# Patient Record
Sex: MALE | Race: WHITE | NOT HISPANIC OR LATINO | Employment: FULL TIME | URBAN - METROPOLITAN AREA
[De-identification: names, ages, dates, MRNs, and addresses within clinical notes are randomized per-mention and may not be internally consistent; named-entity substitution may affect disease eponyms.]

---

## 2017-03-08 ENCOUNTER — HOSPITAL ENCOUNTER (OUTPATIENT)
Dept: OTHER | Facility: HOSPITAL | Age: 27
Discharge: HOME | End: 2017-03-08
Attending: FAMILY MEDICINE | Admitting: FAMILY MEDICINE

## 2017-03-23 ENCOUNTER — CONVERSION ENCOUNTER (OUTPATIENT)
Dept: FAMILY MEDICINE CLINIC | Facility: CLINIC | Age: 27
End: 2017-03-23

## 2017-03-23 LAB — HEMOCCULT STL QL IA: NEGATIVE

## 2017-06-01 ENCOUNTER — HOSPITAL ENCOUNTER (OUTPATIENT)
Dept: OTHER | Facility: HOSPITAL | Age: 27
Discharge: HOME | End: 2017-06-01
Attending: ORTHOPAEDIC SURGERY | Admitting: ORTHOPAEDIC SURGERY

## 2017-06-16 ENCOUNTER — HOSPITAL ENCOUNTER (OUTPATIENT)
Dept: PHYSICAL THERAPY | Facility: HOSPITAL | Age: 27
Setting detail: RECURRING SERIES
Discharge: HOME | End: 2017-08-01
Attending: ORTHOPAEDIC SURGERY | Admitting: ORTHOPAEDIC SURGERY

## 2017-08-10 ENCOUNTER — HOSPITAL ENCOUNTER (OUTPATIENT)
Dept: OTHER | Facility: HOSPITAL | Age: 27
Discharge: HOME | End: 2017-08-10
Attending: FAMILY MEDICINE | Admitting: FAMILY MEDICINE

## 2017-08-29 ENCOUNTER — HOSPITAL ENCOUNTER (OUTPATIENT)
Dept: PREOP | Facility: HOSPITAL | Age: 27
Setting detail: HOSPITAL OUTPATIENT SURGERY
Discharge: HOME | End: 2017-08-29
Attending: SURGERY | Admitting: SURGERY

## 2017-08-29 LAB
ALBUMIN SERPL-MCNC: 3.6 G/DL (ref 3.5–4.8)
ALBUMIN/GLOB SERPL: 1.2 {RATIO} (ref 1–1.7)
ALP SERPL-CCNC: 80 IU/L (ref 32–91)
ALT SERPL-CCNC: 59 IU/L (ref 17–63)
ANION GAP SERPL CALC-SCNC: 12.2 MMOL/L (ref 10–20)
AST SERPL-CCNC: 34 IU/L (ref 15–41)
BILIRUB SERPL-MCNC: 0.8 MG/DL (ref 0.3–1.2)
BUN SERPL-MCNC: 6 MG/DL (ref 8–20)
BUN/CREAT SERPL: 8.6 (ref 6.2–20.3)
CALCIUM SERPL-MCNC: 9 MG/DL (ref 8.9–10.3)
CHLORIDE SERPL-SCNC: 103 MMOL/L (ref 101–111)
CONV CO2: 27 MMOL/L (ref 22–32)
CONV TOTAL PROTEIN: 6.6 G/DL (ref 6.1–7.9)
CREAT UR-MCNC: 0.7 MG/DL (ref 0.7–1.2)
GLOBULIN UR ELPH-MCNC: 3 G/DL (ref 2.5–3.8)
GLUCOSE SERPL-MCNC: 92 MG/DL (ref 65–99)
POTASSIUM SERPL-SCNC: 3.2 MMOL/L (ref 3.6–5.1)
SODIUM SERPL-SCNC: 139 MMOL/L (ref 136–144)

## 2017-09-20 ENCOUNTER — HOSPITAL ENCOUNTER (OUTPATIENT)
Dept: LAB | Facility: HOSPITAL | Age: 27
Discharge: HOME | End: 2017-09-20
Attending: SURGERY | Admitting: SURGERY

## 2017-09-28 ENCOUNTER — HOSPITAL ENCOUNTER (OUTPATIENT)
Dept: MRI IMAGING | Facility: HOSPITAL | Age: 27
Discharge: HOME | End: 2017-09-28
Attending: SURGERY | Admitting: SURGERY

## 2017-10-02 ENCOUNTER — OFFICE (AMBULATORY)
Dept: URBAN - METROPOLITAN AREA CLINIC 64 | Facility: CLINIC | Age: 27
End: 2017-10-02

## 2017-10-02 VITALS
WEIGHT: 315 LBS | HEART RATE: 99 BPM | DIASTOLIC BLOOD PRESSURE: 72 MMHG | HEIGHT: 74 IN | SYSTOLIC BLOOD PRESSURE: 133 MMHG

## 2017-10-02 DIAGNOSIS — K59.00 CONSTIPATION, UNSPECIFIED: ICD-10-CM

## 2017-10-02 DIAGNOSIS — R11.0 NAUSEA: ICD-10-CM

## 2017-10-02 DIAGNOSIS — R10.11 RIGHT UPPER QUADRANT PAIN: ICD-10-CM

## 2017-10-02 LAB
AMYLASE, SERUM: 54 U/L (ref 31–124)
C-REACTIVE PROTEIN, QUANT: 6.3 MG/L — HIGH (ref 0–4.9)
CBC WITH DIFFERENTIAL/PLATELET: BASO (ABSOLUTE): 0 X10E3/UL (ref 0–0.2)
CBC WITH DIFFERENTIAL/PLATELET: BASOS: 1 %
CBC WITH DIFFERENTIAL/PLATELET: EOS (ABSOLUTE): 0.4 X10E3/UL (ref 0–0.4)
CBC WITH DIFFERENTIAL/PLATELET: EOS: 6 %
CBC WITH DIFFERENTIAL/PLATELET: HEMATOCRIT: 39.1 % (ref 37.5–51)
CBC WITH DIFFERENTIAL/PLATELET: HEMATOLOGY COMMENTS: (no result)
CBC WITH DIFFERENTIAL/PLATELET: HEMOGLOBIN: 12.3 G/DL — LOW (ref 12.6–17.7)
CBC WITH DIFFERENTIAL/PLATELET: IMMATURE CELLS: (no result)
CBC WITH DIFFERENTIAL/PLATELET: IMMATURE GRANS (ABS): 0 X10E3/UL (ref 0–0.1)
CBC WITH DIFFERENTIAL/PLATELET: IMMATURE GRANULOCYTES: 0 %
CBC WITH DIFFERENTIAL/PLATELET: LYMPHS (ABSOLUTE): 2.6 X10E3/UL (ref 0.7–3.1)
CBC WITH DIFFERENTIAL/PLATELET: LYMPHS: 37 %
CBC WITH DIFFERENTIAL/PLATELET: MCH: 28.3 PG (ref 26.6–33)
CBC WITH DIFFERENTIAL/PLATELET: MCHC: 31.5 G/DL (ref 31.5–35.7)
CBC WITH DIFFERENTIAL/PLATELET: MCV: 90 FL (ref 79–97)
CBC WITH DIFFERENTIAL/PLATELET: MONOCYTES(ABSOLUTE): 0.5 X10E3/UL (ref 0.1–0.9)
CBC WITH DIFFERENTIAL/PLATELET: MONOCYTES: 8 %
CBC WITH DIFFERENTIAL/PLATELET: NEUTROPHILS (ABSOLUTE): 3.3 X10E3/UL (ref 1.4–7)
CBC WITH DIFFERENTIAL/PLATELET: NEUTROPHILS: 48 %
CBC WITH DIFFERENTIAL/PLATELET: NRBC: (no result)
CBC WITH DIFFERENTIAL/PLATELET: PLATELETS: 379 X10E3/UL (ref 150–379)
CBC WITH DIFFERENTIAL/PLATELET: RBC: 4.35 X10E6/UL (ref 4.14–5.8)
CBC WITH DIFFERENTIAL/PLATELET: RDW: 14.1 % (ref 12.3–15.4)
CBC WITH DIFFERENTIAL/PLATELET: WBC: 6.9 X10E3/UL (ref 3.4–10.8)
COMP. METABOLIC PANEL (14): A/G RATIO: 1.4 (ref 1.2–2.2)
COMP. METABOLIC PANEL (14): ALBUMIN, SERUM: 4.2 G/DL (ref 3.5–5.5)
COMP. METABOLIC PANEL (14): ALKALINE PHOSPHATASE, S: 102 IU/L (ref 39–117)
COMP. METABOLIC PANEL (14): ALT (SGPT): 21 IU/L (ref 0–44)
COMP. METABOLIC PANEL (14): AST (SGOT): 15 IU/L (ref 0–40)
COMP. METABOLIC PANEL (14): BILIRUBIN, TOTAL: 0.2 MG/DL (ref 0–1.2)
COMP. METABOLIC PANEL (14): BUN/CREATININE RATIO: 14 (ref 9–20)
COMP. METABOLIC PANEL (14): BUN: 8 MG/DL (ref 6–20)
COMP. METABOLIC PANEL (14): CALCIUM, SERUM: 9.3 MG/DL (ref 8.7–10.2)
COMP. METABOLIC PANEL (14): CARBON DIOXIDE, TOTAL: 24 MMOL/L (ref 18–29)
COMP. METABOLIC PANEL (14): CHLORIDE, SERUM: 101 MMOL/L (ref 96–106)
COMP. METABOLIC PANEL (14): CREATININE, SERUM: 0.59 MG/DL — LOW (ref 0.76–1.27)
COMP. METABOLIC PANEL (14): EGFR IF AFRICN AM: 160 ML/MIN/1.73 (ref 59–?)
COMP. METABOLIC PANEL (14): EGFR IF NONAFRICN AM: 139 ML/MIN/1.73 (ref 59–?)
COMP. METABOLIC PANEL (14): GLOBULIN, TOTAL: 2.9 G/DL (ref 1.5–4.5)
COMP. METABOLIC PANEL (14): GLUCOSE, SERUM: 100 MG/DL — HIGH (ref 65–99)
COMP. METABOLIC PANEL (14): POTASSIUM, SERUM: 3.8 MMOL/L (ref 3.5–5.2)
COMP. METABOLIC PANEL (14): PROTEIN, TOTAL, SERUM: 7.1 G/DL (ref 6–8.5)
COMP. METABOLIC PANEL (14): SODIUM, SERUM: 142 MMOL/L (ref 134–144)
LIPASE, SERUM: 20 U/L (ref 13–78)

## 2017-10-02 PROCEDURE — 99243 OFF/OP CNSLTJ NEW/EST LOW 30: CPT | Performed by: NURSE PRACTITIONER

## 2017-10-02 RX ORDER — PANTOPRAZOLE SODIUM 40 MG/1
TABLET, DELAYED RELEASE ORAL
Qty: 30 | Refills: 1 | Status: COMPLETED
Start: 2017-10-02 | End: 2017-11-06

## 2017-10-02 RX ORDER — DICYCLOMINE HYDROCHLORIDE 20.6 MG/1
40 TABLET ORAL
Qty: 60 | Refills: 1 | Status: COMPLETED
Start: 2017-10-02 | End: 2017-11-06

## 2017-10-13 ENCOUNTER — ON CAMPUS - OUTPATIENT (AMBULATORY)
Dept: URBAN - METROPOLITAN AREA HOSPITAL 2 | Facility: HOSPITAL | Age: 27
End: 2017-10-13
Payer: COMMERCIAL

## 2017-10-13 VITALS
DIASTOLIC BLOOD PRESSURE: 81 MMHG | DIASTOLIC BLOOD PRESSURE: 69 MMHG | HEART RATE: 83 BPM | HEIGHT: 74 IN | OXYGEN SATURATION: 100 % | OXYGEN SATURATION: 96 % | DIASTOLIC BLOOD PRESSURE: 73 MMHG | RESPIRATION RATE: 18 BRPM | RESPIRATION RATE: 16 BRPM | SYSTOLIC BLOOD PRESSURE: 122 MMHG | HEART RATE: 73 BPM | SYSTOLIC BLOOD PRESSURE: 130 MMHG | SYSTOLIC BLOOD PRESSURE: 121 MMHG | SYSTOLIC BLOOD PRESSURE: 149 MMHG | HEART RATE: 72 BPM | HEART RATE: 79 BPM | WEIGHT: 315 LBS | OXYGEN SATURATION: 91 % | DIASTOLIC BLOOD PRESSURE: 72 MMHG | SYSTOLIC BLOOD PRESSURE: 159 MMHG | DIASTOLIC BLOOD PRESSURE: 51 MMHG | TEMPERATURE: 97.4 F | SYSTOLIC BLOOD PRESSURE: 136 MMHG | OXYGEN SATURATION: 97 % | HEART RATE: 81 BPM | HEART RATE: 96 BPM

## 2017-10-13 DIAGNOSIS — K44.9 DIAPHRAGMATIC HERNIA WITHOUT OBSTRUCTION OR GANGRENE: ICD-10-CM

## 2017-10-13 DIAGNOSIS — K21.0 GASTRO-ESOPHAGEAL REFLUX DISEASE WITH ESOPHAGITIS: ICD-10-CM

## 2017-10-13 DIAGNOSIS — R11.0 NAUSEA: ICD-10-CM

## 2017-10-13 DIAGNOSIS — K92.0 HEMATEMESIS: ICD-10-CM

## 2017-10-13 DIAGNOSIS — R10.11 RIGHT UPPER QUADRANT PAIN: ICD-10-CM

## 2017-10-13 PROBLEM — K20.8 OTHER ESOPHAGITIS: Status: ACTIVE | Noted: 2017-10-13

## 2017-10-13 PROBLEM — K21.9 GERD: Status: ACTIVE | Noted: 2017-10-13

## 2017-10-13 PROCEDURE — 43235 EGD DIAGNOSTIC BRUSH WASH: CPT | Performed by: INTERNAL MEDICINE

## 2017-10-13 PROCEDURE — 43450 DILATE ESOPHAGUS 1/MULT PASS: CPT | Performed by: INTERNAL MEDICINE

## 2017-10-13 RX ORDER — METOCLOPRAMIDE HYDROCHLORIDE 10 MG/1
30 TABLET ORAL
Qty: 90 | Refills: 10 | Status: COMPLETED
Start: 2017-10-13 | End: 2017-11-06

## 2017-10-13 RX ORDER — SUCRALFATE 1 G/1
TABLET ORAL
Qty: 0 | Refills: 0 | Status: COMPLETED
End: 2017-10-13

## 2017-10-13 RX ADMIN — PROPOFOL: 10 INJECTION, EMULSION INTRAVENOUS at 08:07

## 2017-11-06 ENCOUNTER — OFFICE (AMBULATORY)
Dept: URBAN - METROPOLITAN AREA CLINIC 64 | Facility: CLINIC | Age: 27
End: 2017-11-06

## 2017-11-06 VITALS
HEART RATE: 71 BPM | WEIGHT: 315 LBS | DIASTOLIC BLOOD PRESSURE: 71 MMHG | SYSTOLIC BLOOD PRESSURE: 116 MMHG | HEIGHT: 74 IN

## 2017-11-06 DIAGNOSIS — R11.2 NAUSEA WITH VOMITING, UNSPECIFIED: ICD-10-CM

## 2017-11-06 DIAGNOSIS — R10.11 RIGHT UPPER QUADRANT PAIN: ICD-10-CM

## 2017-11-06 DIAGNOSIS — R10.13 EPIGASTRIC PAIN: ICD-10-CM

## 2017-11-06 DIAGNOSIS — K59.00 CONSTIPATION, UNSPECIFIED: ICD-10-CM

## 2017-11-06 DIAGNOSIS — E66.01 MORBID (SEVERE) OBESITY DUE TO EXCESS CALORIES: ICD-10-CM

## 2017-11-06 LAB
AMYLASE, SERUM: 56 U/L (ref 31–124)
C-REACTIVE PROTEIN, QUANT: 5.2 MG/L — HIGH (ref 0–4.9)
CBC WITH DIFFERENTIAL/PLATELET: BASO (ABSOLUTE): 0 X10E3/UL (ref 0–0.2)
CBC WITH DIFFERENTIAL/PLATELET: BASOS: 1 %
CBC WITH DIFFERENTIAL/PLATELET: EOS (ABSOLUTE): 0.3 X10E3/UL (ref 0–0.4)
CBC WITH DIFFERENTIAL/PLATELET: EOS: 3 %
CBC WITH DIFFERENTIAL/PLATELET: HEMATOCRIT: 38 % (ref 37.5–51)
CBC WITH DIFFERENTIAL/PLATELET: HEMATOLOGY COMMENTS: (no result)
CBC WITH DIFFERENTIAL/PLATELET: HEMOGLOBIN: 12.3 G/DL — LOW (ref 12.6–17.7)
CBC WITH DIFFERENTIAL/PLATELET: IMMATURE CELLS: (no result)
CBC WITH DIFFERENTIAL/PLATELET: IMMATURE GRANS (ABS): 0 X10E3/UL (ref 0–0.1)
CBC WITH DIFFERENTIAL/PLATELET: IMMATURE GRANULOCYTES: 0 %
CBC WITH DIFFERENTIAL/PLATELET: LYMPHS (ABSOLUTE): 2.8 X10E3/UL (ref 0.7–3.1)
CBC WITH DIFFERENTIAL/PLATELET: LYMPHS: 39 %
CBC WITH DIFFERENTIAL/PLATELET: MCH: 28.8 PG (ref 26.6–33)
CBC WITH DIFFERENTIAL/PLATELET: MCHC: 32.4 G/DL (ref 31.5–35.7)
CBC WITH DIFFERENTIAL/PLATELET: MCV: 89 FL (ref 79–97)
CBC WITH DIFFERENTIAL/PLATELET: MONOCYTES(ABSOLUTE): 0.6 X10E3/UL (ref 0.1–0.9)
CBC WITH DIFFERENTIAL/PLATELET: MONOCYTES: 9 %
CBC WITH DIFFERENTIAL/PLATELET: NEUTROPHILS (ABSOLUTE): 3.6 X10E3/UL (ref 1.4–7)
CBC WITH DIFFERENTIAL/PLATELET: NEUTROPHILS: 48 %
CBC WITH DIFFERENTIAL/PLATELET: NRBC: (no result)
CBC WITH DIFFERENTIAL/PLATELET: PLATELETS: 404 X10E3/UL — HIGH (ref 150–379)
CBC WITH DIFFERENTIAL/PLATELET: RBC: 4.27 X10E6/UL (ref 4.14–5.8)
CBC WITH DIFFERENTIAL/PLATELET: RDW: 14.3 % (ref 12.3–15.4)
CBC WITH DIFFERENTIAL/PLATELET: WBC: 7.3 X10E3/UL (ref 3.4–10.8)
COMP. METABOLIC PANEL (14): A/G RATIO: 1.5 (ref 1.2–2.2)
COMP. METABOLIC PANEL (14): ALBUMIN, SERUM: 4.3 G/DL (ref 3.5–5.5)
COMP. METABOLIC PANEL (14): ALKALINE PHOSPHATASE, S: 116 IU/L (ref 39–117)
COMP. METABOLIC PANEL (14): ALT (SGPT): 22 IU/L (ref 0–44)
COMP. METABOLIC PANEL (14): AST (SGOT): 14 IU/L (ref 0–40)
COMP. METABOLIC PANEL (14): BILIRUBIN, TOTAL: 0.4 MG/DL (ref 0–1.2)
COMP. METABOLIC PANEL (14): BUN/CREATININE RATIO: 13 (ref 9–20)
COMP. METABOLIC PANEL (14): BUN: 8 MG/DL (ref 6–20)
COMP. METABOLIC PANEL (14): CALCIUM, SERUM: 9.5 MG/DL (ref 8.7–10.2)
COMP. METABOLIC PANEL (14): CARBON DIOXIDE, TOTAL: 25 MMOL/L (ref 18–29)
COMP. METABOLIC PANEL (14): CHLORIDE, SERUM: 101 MMOL/L (ref 96–106)
COMP. METABOLIC PANEL (14): CREATININE, SERUM: 0.6 MG/DL — LOW (ref 0.76–1.27)
COMP. METABOLIC PANEL (14): EGFR IF AFRICN AM: 159 ML/MIN/1.73 (ref 59–?)
COMP. METABOLIC PANEL (14): EGFR IF NONAFRICN AM: 138 ML/MIN/1.73 (ref 59–?)
COMP. METABOLIC PANEL (14): GLOBULIN, TOTAL: 2.9 G/DL (ref 1.5–4.5)
COMP. METABOLIC PANEL (14): GLUCOSE, SERUM: 98 MG/DL (ref 65–99)
COMP. METABOLIC PANEL (14): POTASSIUM, SERUM: 4.3 MMOL/L (ref 3.5–5.2)
COMP. METABOLIC PANEL (14): PROTEIN, TOTAL, SERUM: 7.2 G/DL (ref 6–8.5)
COMP. METABOLIC PANEL (14): SODIUM, SERUM: 143 MMOL/L (ref 134–144)
LIPASE, SERUM: 19 U/L (ref 13–78)

## 2017-11-06 PROCEDURE — 99213 OFFICE O/P EST LOW 20 MIN: CPT | Performed by: NURSE PRACTITIONER

## 2017-11-10 ENCOUNTER — HOSPITAL ENCOUNTER (OUTPATIENT)
Dept: NUCLEAR MEDICINE | Facility: HOSPITAL | Age: 27
Discharge: HOME | End: 2017-11-10
Attending: NURSE PRACTITIONER | Admitting: NURSE PRACTITIONER

## 2017-11-29 ENCOUNTER — OFFICE (AMBULATORY)
Dept: URBAN - METROPOLITAN AREA CLINIC 64 | Facility: CLINIC | Age: 27
End: 2017-11-29

## 2017-11-29 VITALS
SYSTOLIC BLOOD PRESSURE: 144 MMHG | WEIGHT: 315 LBS | HEIGHT: 74 IN | DIASTOLIC BLOOD PRESSURE: 85 MMHG | HEART RATE: 80 BPM

## 2017-11-29 DIAGNOSIS — R11.2 NAUSEA WITH VOMITING, UNSPECIFIED: ICD-10-CM

## 2017-11-29 DIAGNOSIS — R10.13 EPIGASTRIC PAIN: ICD-10-CM

## 2017-11-29 DIAGNOSIS — R63.4 ABNORMAL WEIGHT LOSS: ICD-10-CM

## 2017-11-29 DIAGNOSIS — R10.11 RIGHT UPPER QUADRANT PAIN: ICD-10-CM

## 2017-11-29 LAB
AMYLASE, SERUM: 51 U/L (ref 31–124)
C-REACTIVE PROTEIN, QUANT: 8.2 MG/L — HIGH (ref 0–4.9)
CBC WITH DIFFERENTIAL/PLATELET: BASO (ABSOLUTE): 0 X10E3/UL (ref 0–0.2)
CBC WITH DIFFERENTIAL/PLATELET: BASOS: 0 %
CBC WITH DIFFERENTIAL/PLATELET: EOS (ABSOLUTE): 0.1 X10E3/UL (ref 0–0.4)
CBC WITH DIFFERENTIAL/PLATELET: EOS: 1 %
CBC WITH DIFFERENTIAL/PLATELET: HEMATOCRIT: 39.7 % (ref 37.5–51)
CBC WITH DIFFERENTIAL/PLATELET: HEMATOLOGY COMMENTS: (no result)
CBC WITH DIFFERENTIAL/PLATELET: HEMOGLOBIN: 13.2 G/DL (ref 12.6–17.7)
CBC WITH DIFFERENTIAL/PLATELET: IMMATURE CELLS: (no result)
CBC WITH DIFFERENTIAL/PLATELET: IMMATURE GRANS (ABS): 0 X10E3/UL (ref 0–0.1)
CBC WITH DIFFERENTIAL/PLATELET: IMMATURE GRANULOCYTES: 0 %
CBC WITH DIFFERENTIAL/PLATELET: LYMPHS (ABSOLUTE): 3 X10E3/UL (ref 0.7–3.1)
CBC WITH DIFFERENTIAL/PLATELET: LYMPHS: 25 %
CBC WITH DIFFERENTIAL/PLATELET: MCH: 30.1 PG (ref 26.6–33)
CBC WITH DIFFERENTIAL/PLATELET: MCHC: 33.2 G/DL (ref 31.5–35.7)
CBC WITH DIFFERENTIAL/PLATELET: MCV: 91 FL (ref 79–97)
CBC WITH DIFFERENTIAL/PLATELET: MONOCYTES(ABSOLUTE): 0.9 X10E3/UL (ref 0.1–0.9)
CBC WITH DIFFERENTIAL/PLATELET: MONOCYTES: 7 %
CBC WITH DIFFERENTIAL/PLATELET: NEUTROPHILS (ABSOLUTE): 8.3 X10E3/UL — HIGH (ref 1.4–7)
CBC WITH DIFFERENTIAL/PLATELET: NEUTROPHILS: 67 %
CBC WITH DIFFERENTIAL/PLATELET: NRBC: (no result)
CBC WITH DIFFERENTIAL/PLATELET: PLATELETS: 438 X10E3/UL — HIGH (ref 150–379)
CBC WITH DIFFERENTIAL/PLATELET: RBC: 4.38 X10E6/UL (ref 4.14–5.8)
CBC WITH DIFFERENTIAL/PLATELET: RDW: 13.9 % (ref 12.3–15.4)
CBC WITH DIFFERENTIAL/PLATELET: WBC: 12.4 X10E3/UL — HIGH (ref 3.4–10.8)
COMP. METABOLIC PANEL (14): A/G RATIO: 1.4 (ref 1.2–2.2)
COMP. METABOLIC PANEL (14): ALBUMIN, SERUM: 4.5 G/DL (ref 3.5–5.5)
COMP. METABOLIC PANEL (14): ALKALINE PHOSPHATASE, S: 117 IU/L (ref 39–117)
COMP. METABOLIC PANEL (14): ALT (SGPT): 18 IU/L (ref 0–44)
COMP. METABOLIC PANEL (14): AST (SGOT): 13 IU/L (ref 0–40)
COMP. METABOLIC PANEL (14): BILIRUBIN, TOTAL: 0.4 MG/DL (ref 0–1.2)
COMP. METABOLIC PANEL (14): BUN/CREATININE RATIO: 19 (ref 9–20)
COMP. METABOLIC PANEL (14): BUN: 10 MG/DL (ref 6–20)
COMP. METABOLIC PANEL (14): CALCIUM, SERUM: 9.6 MG/DL (ref 8.7–10.2)
COMP. METABOLIC PANEL (14): CARBON DIOXIDE, TOTAL: 25 MMOL/L (ref 18–29)
COMP. METABOLIC PANEL (14): CHLORIDE, SERUM: 100 MMOL/L (ref 96–106)
COMP. METABOLIC PANEL (14): CREATININE, SERUM: 0.53 MG/DL — LOW (ref 0.76–1.27)
COMP. METABOLIC PANEL (14): EGFR IF AFRICN AM: 168 ML/MIN/1.73 (ref 59–?)
COMP. METABOLIC PANEL (14): EGFR IF NONAFRICN AM: 145 ML/MIN/1.73 (ref 59–?)
COMP. METABOLIC PANEL (14): GLOBULIN, TOTAL: 3.3 G/DL (ref 1.5–4.5)
COMP. METABOLIC PANEL (14): GLUCOSE, SERUM: 104 MG/DL — HIGH (ref 65–99)
COMP. METABOLIC PANEL (14): POTASSIUM, SERUM: 4.3 MMOL/L (ref 3.5–5.2)
COMP. METABOLIC PANEL (14): PROTEIN, TOTAL, SERUM: 7.8 G/DL (ref 6–8.5)
COMP. METABOLIC PANEL (14): SODIUM, SERUM: 142 MMOL/L (ref 134–144)
LIPASE, SERUM: 17 U/L (ref 13–78)

## 2017-11-29 PROCEDURE — 99214 OFFICE O/P EST MOD 30 MIN: CPT | Performed by: NURSE PRACTITIONER

## 2017-11-29 RX ORDER — ONDANSETRON HYDROCHLORIDE 4 MG/1
16 TABLET, ORALLY DISINTEGRATING ORAL
Qty: 60 | Refills: 0 | Status: ACTIVE
Start: 2017-11-29

## 2017-11-29 RX ORDER — PANTOPRAZOLE SODIUM 40 MG/1
40 TABLET, DELAYED RELEASE ORAL
Qty: 90 | Refills: 1 | Status: ACTIVE
Start: 2017-11-29

## 2017-12-29 ENCOUNTER — ON CAMPUS - OUTPATIENT (AMBULATORY)
Dept: URBAN - METROPOLITAN AREA HOSPITAL 2 | Facility: HOSPITAL | Age: 27
End: 2017-12-29

## 2017-12-29 ENCOUNTER — OFFICE (AMBULATORY)
Dept: URBAN - METROPOLITAN AREA CLINIC 64 | Facility: CLINIC | Age: 27
End: 2017-12-29

## 2017-12-29 VITALS
RESPIRATION RATE: 18 BRPM | DIASTOLIC BLOOD PRESSURE: 90 MMHG | OXYGEN SATURATION: 99 % | DIASTOLIC BLOOD PRESSURE: 61 MMHG | RESPIRATION RATE: 17 BRPM | HEART RATE: 88 BPM | HEIGHT: 74 IN | TEMPERATURE: 97.3 F | SYSTOLIC BLOOD PRESSURE: 128 MMHG | HEART RATE: 72 BPM | DIASTOLIC BLOOD PRESSURE: 55 MMHG | SYSTOLIC BLOOD PRESSURE: 119 MMHG | SYSTOLIC BLOOD PRESSURE: 120 MMHG | DIASTOLIC BLOOD PRESSURE: 60 MMHG | SYSTOLIC BLOOD PRESSURE: 122 MMHG | SYSTOLIC BLOOD PRESSURE: 133 MMHG | SYSTOLIC BLOOD PRESSURE: 147 MMHG | HEART RATE: 91 BPM | RESPIRATION RATE: 16 BRPM | OXYGEN SATURATION: 98 % | HEART RATE: 86 BPM | HEART RATE: 80 BPM | WEIGHT: 315 LBS | HEART RATE: 79 BPM | HEART RATE: 76 BPM | DIASTOLIC BLOOD PRESSURE: 78 MMHG | DIASTOLIC BLOOD PRESSURE: 70 MMHG | HEART RATE: 82 BPM | DIASTOLIC BLOOD PRESSURE: 83 MMHG | SYSTOLIC BLOOD PRESSURE: 94 MMHG | OXYGEN SATURATION: 97 % | DIASTOLIC BLOOD PRESSURE: 82 MMHG

## 2017-12-29 DIAGNOSIS — K62.6 ULCER OF ANUS AND RECTUM: ICD-10-CM

## 2017-12-29 DIAGNOSIS — R10.11 RIGHT UPPER QUADRANT PAIN: ICD-10-CM

## 2017-12-29 DIAGNOSIS — R11.2 NAUSEA WITH VOMITING, UNSPECIFIED: ICD-10-CM

## 2017-12-29 DIAGNOSIS — R63.4 ABNORMAL WEIGHT LOSS: ICD-10-CM

## 2017-12-29 DIAGNOSIS — K62.89 OTHER SPECIFIED DISEASES OF ANUS AND RECTUM: ICD-10-CM

## 2017-12-29 LAB
GI HISTOLOGY: A. UNSPECIFIED: (no result)
GI HISTOLOGY: PDF REPORT: (no result)

## 2017-12-29 PROCEDURE — 45380 COLONOSCOPY AND BIOPSY: CPT | Performed by: INTERNAL MEDICINE

## 2017-12-29 PROCEDURE — 88305 TISSUE EXAM BY PATHOLOGIST: CPT | Performed by: INTERNAL MEDICINE

## 2017-12-29 RX ADMIN — PROPOFOL: 10 INJECTION, EMULSION INTRAVENOUS at 14:09

## 2018-01-10 ENCOUNTER — OFFICE (AMBULATORY)
Dept: URBAN - METROPOLITAN AREA CLINIC 64 | Facility: CLINIC | Age: 28
End: 2018-01-10
Payer: COMMERCIAL

## 2018-01-10 VITALS
HEIGHT: 74 IN | DIASTOLIC BLOOD PRESSURE: 76 MMHG | WEIGHT: 315 LBS | SYSTOLIC BLOOD PRESSURE: 116 MMHG | HEART RATE: 76 BPM

## 2018-01-10 DIAGNOSIS — R63.4 ABNORMAL WEIGHT LOSS: ICD-10-CM

## 2018-01-10 DIAGNOSIS — R11.2 NAUSEA WITH VOMITING, UNSPECIFIED: ICD-10-CM

## 2018-01-10 PROCEDURE — 99213 OFFICE O/P EST LOW 20 MIN: CPT | Performed by: INTERNAL MEDICINE

## 2018-01-10 RX ORDER — METOCLOPRAMIDE 5 MG/1
10 TABLET ORAL
Qty: 60 | Refills: 3 | Status: ACTIVE
Start: 2018-01-10

## 2019-09-09 ENCOUNTER — OFFICE VISIT (OUTPATIENT)
Dept: FAMILY MEDICINE CLINIC | Facility: CLINIC | Age: 29
End: 2019-09-09

## 2019-09-09 VITALS
TEMPERATURE: 97.7 F | RESPIRATION RATE: 18 BRPM | OXYGEN SATURATION: 97 % | HEART RATE: 123 BPM | BODY MASS INDEX: 40.43 KG/M2 | DIASTOLIC BLOOD PRESSURE: 74 MMHG | SYSTOLIC BLOOD PRESSURE: 136 MMHG | WEIGHT: 315 LBS | HEIGHT: 74 IN

## 2019-09-09 DIAGNOSIS — S92.355S: ICD-10-CM

## 2019-09-09 DIAGNOSIS — Z01.818 PREOP EXAMINATION: Primary | ICD-10-CM

## 2019-09-09 PROBLEM — M12.561 TRAUMATIC ARTHRITIS OF RIGHT KNEE: Status: ACTIVE | Noted: 2019-09-09

## 2019-09-09 PROBLEM — S92.355A NONDISPLACED FRACTURE OF FIFTH LEFT METATARSAL BONE: Status: ACTIVE | Noted: 2019-09-09

## 2019-09-09 PROBLEM — S82.024A CLOSED NONDISPLACED LONGITUDINAL FRACTURE OF RIGHT PATELLA: Status: ACTIVE | Noted: 2019-09-09

## 2019-09-09 PROBLEM — E66.01 MORBID OBESITY: Status: ACTIVE | Noted: 2019-09-09

## 2019-09-09 PROCEDURE — 99213 OFFICE O/P EST LOW 20 MIN: CPT | Performed by: FAMILY MEDICINE

## 2019-09-09 NOTE — PROGRESS NOTES
Subjective   Johnny Duran is a 29 y.o. male.     Needing approval for left foot surgery. Surgeon is  from [facility]. Unable to schedule surgery until approved by PCP.       Foot Injury    Incident onset: 3/28/2019. The pain is present in the left foot. Pertinent negatives include no numbness.        The following portions of the patient's history were reviewed and updated as appropriate: allergies, current medications, past family history, past medical history, past social history, past surgical history and problem list.    Patient Active Problem List   Diagnosis   • Closed nondisplaced longitudinal fracture of right patella   • Morbid obesity (CMS/HCC)   • Nondisplaced fracture of fifth left metatarsal bone   • Traumatic arthritis of right knee       No current outpatient medications on file prior to visit.     No current facility-administered medications on file prior to visit.        Allergies   Allergen Reactions   • Codeine Unknown (See Comments)       Review of Systems   Constitutional: Negative for activity change, appetite change, fatigue and fever.   HENT: Negative for ear pain, swollen glands and voice change.    Eyes: Negative for visual disturbance.   Respiratory: Negative for shortness of breath and wheezing.    Cardiovascular: Negative for chest pain and leg swelling.   Gastrointestinal: Negative for abdominal pain, blood in stool, constipation, diarrhea, nausea and vomiting.   Endocrine: Negative for polydipsia and polyuria.   Genitourinary: Negative for dysuria, frequency and hematuria.   Musculoskeletal: Positive for arthralgias. Negative for joint swelling, neck pain and neck stiffness.   Skin: Negative for rash and bruise.   Neurological: Negative for weakness, numbness and headache.   Psychiatric/Behavioral: Negative for suicidal ideas and depressed mood.       Objective   Physical Exam   Constitutional: He is oriented to person, place, and time. He appears  well-developed and well-nourished.   Eyes: Conjunctivae and EOM are normal. Pupils are equal, round, and reactive to light.   Neck: Normal range of motion. Neck supple.   Cardiovascular: Normal rate, regular rhythm and normal heart sounds.   Pulmonary/Chest: Effort normal and breath sounds normal.   Abdominal: Soft. Bowel sounds are normal.   Musculoskeletal: Normal range of motion.   Left foot in walking boot   Neurological: He is alert and oriented to person, place, and time.   Skin: Skin is warm and dry.   Psychiatric: He has a normal mood and affect. His behavior is normal. Judgment and thought content normal.         Assessment/Plan .  Problem List Items Addressed This Visit     Nondisplaced fracture of fifth left metatarsal bone    Current Assessment & Plan     On Aug 2018 and March 28 2019           Other Visit Diagnoses     Preop examination    -  Primary    cleared        EKG reviewed and normal sinus rhythm.  Cleared for surgery

## 2019-09-12 ENCOUNTER — TELEPHONE (OUTPATIENT)
Dept: FAMILY MEDICINE CLINIC | Facility: CLINIC | Age: 29
End: 2019-09-12

## 2019-09-12 NOTE — TELEPHONE ENCOUNTER
PT IS CALLING TO CHECK ON HIS MEDICAL CLEARANCE FROM DR OJEDA FROM Monday OR Tuesday BECAUSE [facility] HASNT RECEIVED ANYTHING YET.  [phone]

## 2019-09-27 ENCOUNTER — TELEPHONE (OUTPATIENT)
Dept: FAMILY MEDICINE CLINIC | Facility: CLINIC | Age: 29
End: 2019-09-27

## 2019-10-02 ENCOUNTER — TELEPHONE (OUTPATIENT)
Dept: FAMILY MEDICINE CLINIC | Facility: CLINIC | Age: 29
End: 2019-10-02

## 2020-08-07 ENCOUNTER — OFFICE VISIT (OUTPATIENT)
Dept: FAMILY MEDICINE CLINIC | Facility: CLINIC | Age: 30
End: 2020-08-07

## 2020-08-07 ENCOUNTER — HOSPITAL ENCOUNTER (OUTPATIENT)
Dept: GENERAL RADIOLOGY | Facility: HOSPITAL | Age: 30
Discharge: HOME | End: 2020-08-07
Admitting: FAMILY MEDICINE

## 2020-08-07 VITALS
RESPIRATION RATE: 20 BRPM | OXYGEN SATURATION: 96 % | BODY MASS INDEX: 40.43 KG/M2 | TEMPERATURE: 98.2 F | HEIGHT: 74 IN | WEIGHT: 315 LBS | HEART RATE: 96 BPM

## 2020-08-07 DIAGNOSIS — S92.355S: Primary | ICD-10-CM

## 2020-08-07 PROCEDURE — 99213 OFFICE O/P EST LOW 20 MIN: CPT | Performed by: FAMILY MEDICINE

## 2020-08-07 PROCEDURE — 73630 X-RAY EXAM OF FOOT: CPT

## 2020-08-07 RX ORDER — DICLOFENAC SODIUM 75 MG/1
75 TABLET, DELAYED RELEASE ORAL 2 TIMES DAILY
Qty: 60 TABLET | Refills: 0
Start: 2020-08-07 | End: 2021-10-15

## 2020-08-07 RX ORDER — LIDOCAINE 50 MG/G
PATCH TOPICAL
Qty: 30 PATCH | Refills: 2 | Status: SHIPPED | OUTPATIENT
Start: 2020-08-07 | End: 2021-10-15

## 2020-08-07 NOTE — PROGRESS NOTES
Subjective   Johnny Duran is a 30 y.o. male.     Hx of foot surgery on 10/31/19.     Seen surgeon,  on 7/23/20, was prescribed medication, pt doesn't remember name of med and was given a steroid injection in foot. Recommended he see PCP to get referred to a specialist for pain on bottom of left foot.   Ongoing left foot pain s/p hardware. Pt states that podiatry has states pt has met MMI from his standpoint.  Pt was told that he may need braces or repeat surgery at different times. Pt would like second opinion for tx options.     Foot Injury    Incident onset: 3/28/2019. The pain is present in the left foot. Quality: throbbing. The pain has been intermittent since onset. Pertinent negatives include no numbness or tingling. The symptoms are aggravated by weight bearing and movement.        The following portions of the patient's history were reviewed and updated as appropriate: allergies, current medications, past family history, past medical history, past social history, past surgical history and problem list.    Patient Active Problem List   Diagnosis   • Closed nondisplaced longitudinal fracture of right patella   • Morbid obesity (CMS/HCC)   • Nondisplaced fracture of fifth left metatarsal bone   • Traumatic arthritis of right knee       No current outpatient medications on file prior to visit.     No current facility-administered medications on file prior to visit.      Current outpatient and discharge medications have been reconciled for the patient.  Reviewed by: Edouard Vazquez MD      Allergies   Allergen Reactions   • Codeine Unknown (See Comments)       Review of Systems   Constitutional: Negative for activity change, appetite change, fatigue and fever.   HENT: Negative for ear pain, swollen glands and voice change.    Eyes: Negative for visual disturbance.   Respiratory: Negative for shortness of breath and wheezing.    Cardiovascular: Negative for chest pain and leg swelling.    Gastrointestinal: Negative for abdominal pain, blood in stool, constipation, diarrhea, nausea and vomiting.   Endocrine: Negative for polydipsia and polyuria.   Genitourinary: Negative for dysuria, frequency and hematuria.   Musculoskeletal: Negative for joint swelling, neck pain and neck stiffness.   Skin: Negative for rash and bruise.   Neurological: Negative for tingling, weakness, numbness and headache.   Psychiatric/Behavioral: Negative for suicidal ideas and depressed mood.     I have reviewed and confirmed the accuracy of the ROS as documented by the MA/LPN/RN Edouard Vazquez MD      Objective   Vitals:    08/07/20 1425   Pulse: 96   Resp: 20   Temp: 98.2 °F (36.8 °C)   SpO2: 96%   BP    146/84  Physical Exam   Constitutional: He is oriented to person, place, and time. He appears well-developed and well-nourished.   Eyes: Pupils are equal, round, and reactive to light. Conjunctivae and EOM are normal.   Neck: Normal range of motion. Neck supple.   Cardiovascular: Normal rate, regular rhythm and normal heart sounds.   Pulmonary/Chest: Effort normal and breath sounds normal.   Abdominal: Soft. Bowel sounds are normal.   Musculoskeletal: Normal range of motion. He exhibits tenderness (pain left foot ).   Neurological: He is alert and oriented to person, place, and time.   Skin: Skin is warm and dry.   Psychiatric: He has a normal mood and affect. His behavior is normal. Judgment and thought content normal.     I wore protective equipment throughout this patient encounter to include mask. Hand hygiene was performed before donning protective equipment and after removal when leaving the room.    Assessment/Plan .  Problem List Items Addressed This Visit     Nondisplaced fracture of fifth left metatarsal bone - Primary    Relevant Medications    lidocaine (LIDODERM) 5 %    diclofenac (VOLTAREN) 75 MG EC tablet    Other Relevant Orders    Ambulatory Referral to Orthopedic Surgery    XR Foot 3+ View Left  (Completed)      Consider ortho for second opinion, tx options. Try lidoderm patch . Continue voltaren   Medication and medication adverse effects discussed.  Drug education given and explained to patient. Patient verbalized understanding.

## 2021-10-15 ENCOUNTER — OFFICE VISIT (OUTPATIENT)
Dept: FAMILY MEDICINE CLINIC | Facility: CLINIC | Age: 31
End: 2021-10-15

## 2021-10-15 VITALS
DIASTOLIC BLOOD PRESSURE: 72 MMHG | SYSTOLIC BLOOD PRESSURE: 104 MMHG | WEIGHT: 315 LBS | RESPIRATION RATE: 22 BRPM | BODY MASS INDEX: 40.43 KG/M2 | TEMPERATURE: 97.3 F | HEIGHT: 74 IN | HEART RATE: 82 BPM | OXYGEN SATURATION: 98 %

## 2021-10-15 DIAGNOSIS — R42 DIZZINESS: Primary | ICD-10-CM

## 2021-10-15 DIAGNOSIS — R73.9 ELEVATED BLOOD SUGAR LEVEL: ICD-10-CM

## 2021-10-15 DIAGNOSIS — Z13.29 SCREENING FOR HYPOTHYROIDISM: ICD-10-CM

## 2021-10-15 DIAGNOSIS — Z13.220 SCREENING FOR HYPERLIPIDEMIA: ICD-10-CM

## 2021-10-15 DIAGNOSIS — R53.83 MALAISE AND FATIGUE: ICD-10-CM

## 2021-10-15 DIAGNOSIS — R53.81 MALAISE AND FATIGUE: ICD-10-CM

## 2021-10-15 LAB
EXPIRATION DATE: NORMAL
GLUCOSE BLDC GLUCOMTR-MCNC: 116 MG/DL (ref 70–130)
HBA1C MFR BLD: 5.6 %
Lab: NORMAL

## 2021-10-15 PROCEDURE — 82962 GLUCOSE BLOOD TEST: CPT | Performed by: FAMILY MEDICINE

## 2021-10-15 PROCEDURE — 99213 OFFICE O/P EST LOW 20 MIN: CPT | Performed by: FAMILY MEDICINE

## 2021-10-15 PROCEDURE — 83036 HEMOGLOBIN GLYCOSYLATED A1C: CPT | Performed by: FAMILY MEDICINE

## 2021-10-16 LAB
25(OH)D3+25(OH)D2 SERPL-MCNC: 27.4 NG/ML (ref 30–100)
ALBUMIN SERPL-MCNC: 4.2 G/DL (ref 4–5)
ALBUMIN/GLOB SERPL: 1.3 {RATIO} (ref 1.2–2.2)
ALP SERPL-CCNC: 106 IU/L (ref 44–121)
ALT SERPL-CCNC: 28 IU/L (ref 0–44)
AST SERPL-CCNC: 22 IU/L (ref 0–40)
BASOPHILS # BLD AUTO: 0 X10E3/UL (ref 0–0.2)
BASOPHILS NFR BLD AUTO: 0 %
BILIRUB SERPL-MCNC: <0.2 MG/DL (ref 0–1.2)
BUN SERPL-MCNC: 10 MG/DL (ref 6–20)
BUN/CREAT SERPL: 13 (ref 9–20)
CALCIUM SERPL-MCNC: 9.1 MG/DL (ref 8.7–10.2)
CHLORIDE SERPL-SCNC: 101 MMOL/L (ref 96–106)
CHOLEST SERPL-MCNC: 137 MG/DL (ref 100–199)
CHOLEST/HDLC SERPL: 3.9 RATIO (ref 0–5)
CO2 SERPL-SCNC: 23 MMOL/L (ref 20–29)
CREAT SERPL-MCNC: 0.78 MG/DL (ref 0.76–1.27)
EOSINOPHIL # BLD AUTO: 0.1 X10E3/UL (ref 0–0.4)
EOSINOPHIL NFR BLD AUTO: 2 %
ERYTHROCYTE [DISTWIDTH] IN BLOOD BY AUTOMATED COUNT: 12.8 % (ref 11.6–15.4)
FOLATE SERPL-MCNC: 8.9 NG/ML
GLOBULIN SER CALC-MCNC: 3.3 G/DL (ref 1.5–4.5)
GLUCOSE SERPL-MCNC: 114 MG/DL (ref 65–99)
HCT VFR BLD AUTO: 40 % (ref 37.5–51)
HDLC SERPL-MCNC: 35 MG/DL
HGB BLD-MCNC: 13.3 G/DL (ref 13–17.7)
IMM GRANULOCYTES # BLD AUTO: 0 X10E3/UL (ref 0–0.1)
IMM GRANULOCYTES NFR BLD AUTO: 0 %
LDLC SERPL CALC-MCNC: 88 MG/DL (ref 0–99)
LYMPHOCYTES # BLD AUTO: 1.4 X10E3/UL (ref 0.7–3.1)
LYMPHOCYTES NFR BLD AUTO: 42 %
MCH RBC QN AUTO: 28.7 PG (ref 26.6–33)
MCHC RBC AUTO-ENTMCNC: 33.3 G/DL (ref 31.5–35.7)
MCV RBC AUTO: 86 FL (ref 79–97)
MONOCYTES # BLD AUTO: 0.4 X10E3/UL (ref 0.1–0.9)
MONOCYTES NFR BLD AUTO: 11 %
NEUTROPHILS # BLD AUTO: 1.5 X10E3/UL (ref 1.4–7)
NEUTROPHILS NFR BLD AUTO: 45 %
PLATELET # BLD AUTO: 333 X10E3/UL (ref 150–450)
POTASSIUM SERPL-SCNC: 4.3 MMOL/L (ref 3.5–5.2)
PROT SERPL-MCNC: 7.5 G/DL (ref 6–8.5)
RBC # BLD AUTO: 4.64 X10E6/UL (ref 4.14–5.8)
SODIUM SERPL-SCNC: 139 MMOL/L (ref 134–144)
TRIGL SERPL-MCNC: 69 MG/DL (ref 0–149)
TSH SERPL DL<=0.005 MIU/L-ACNC: 1.34 UIU/ML (ref 0.45–4.5)
VIT B12 SERPL-MCNC: 496 PG/ML (ref 232–1245)
VLDLC SERPL CALC-MCNC: 14 MG/DL (ref 5–40)
WBC # BLD AUTO: 3.3 X10E3/UL (ref 3.4–10.8)

## 2021-10-19 ENCOUNTER — TELEPHONE (OUTPATIENT)
Dept: FAMILY MEDICINE CLINIC | Facility: CLINIC | Age: 31
End: 2021-10-19

## 2021-10-20 LAB
TESTOST FREE SERPL-MCNC: 3.3 PG/ML (ref 8.7–25.1)
TESTOST SERPL-MCNC: 15.5 NG/DL (ref 264–916)

## 2021-10-20 NOTE — TELEPHONE ENCOUNTER
Per Dr. Fregoso he needs to come in and either see him and or Doctor George to get these answers as he had several labs that was off

## 2021-10-20 NOTE — TELEPHONE ENCOUNTER
I have sent him a Mychart message     Per Dr. Fregoso he has to come in and see him and or Dr. Vazquez to go over the labs

## 2021-10-27 ENCOUNTER — OFFICE VISIT (OUTPATIENT)
Dept: FAMILY MEDICINE CLINIC | Facility: CLINIC | Age: 31
End: 2021-10-27

## 2021-10-27 VITALS
OXYGEN SATURATION: 98 % | HEIGHT: 74 IN | RESPIRATION RATE: 18 BRPM | SYSTOLIC BLOOD PRESSURE: 112 MMHG | WEIGHT: 315 LBS | DIASTOLIC BLOOD PRESSURE: 66 MMHG | TEMPERATURE: 98 F | HEART RATE: 68 BPM | BODY MASS INDEX: 40.43 KG/M2

## 2021-10-27 DIAGNOSIS — E34.9 TESTOSTERONE DEFICIENCY: ICD-10-CM

## 2021-10-27 DIAGNOSIS — E55.9 VITAMIN D DEFICIENCY: Primary | ICD-10-CM

## 2021-10-27 PROCEDURE — 99213 OFFICE O/P EST LOW 20 MIN: CPT | Performed by: FAMILY MEDICINE

## 2021-11-03 PROBLEM — R42 DIZZINESS: Status: ACTIVE | Noted: 2021-11-03

## 2021-11-03 NOTE — ASSESSMENT & PLAN NOTE
Patient's dizziness may be due to BPPV.  Patient was advised to use the Epley maneuver to treat his symptoms.

## 2021-11-19 PROBLEM — E55.9 VITAMIN D DEFICIENCY: Status: ACTIVE | Noted: 2021-11-19

## 2021-11-19 PROBLEM — E34.9 TESTOSTERONE DEFICIENCY: Status: ACTIVE | Noted: 2021-11-19

## 2021-11-19 PROBLEM — S90.32XA CONTUSION OF LEFT FOOT: Status: ACTIVE | Noted: 2021-11-19

## 2021-11-19 PROBLEM — E66.9 OBESITY: Status: ACTIVE | Noted: 2019-09-09

## 2021-11-19 PROBLEM — M79.672 PAIN IN LEFT FOOT: Status: ACTIVE | Noted: 2021-11-19

## 2021-11-19 NOTE — ASSESSMENT & PLAN NOTE
Patient was advised to take over-the-counter vitamin D to treat his symptoms.  Patient is to repeat labs in about 3 to 6 months.

## 2022-03-08 ENCOUNTER — OFFICE VISIT (OUTPATIENT)
Dept: FAMILY MEDICINE CLINIC | Facility: CLINIC | Age: 32
End: 2022-03-08

## 2022-03-08 VITALS
WEIGHT: 315 LBS | OXYGEN SATURATION: 98 % | SYSTOLIC BLOOD PRESSURE: 132 MMHG | BODY MASS INDEX: 40.43 KG/M2 | RESPIRATION RATE: 20 BRPM | TEMPERATURE: 96.4 F | DIASTOLIC BLOOD PRESSURE: 84 MMHG | HEIGHT: 74 IN | HEART RATE: 107 BPM

## 2022-03-08 DIAGNOSIS — M72.2 PLANTAR FASCIITIS OF RIGHT FOOT: Primary | ICD-10-CM

## 2022-03-08 PROBLEM — E66.813 CLASS 3 SEVERE OBESITY DUE TO EXCESS CALORIES WITH SERIOUS COMORBIDITY AND BODY MASS INDEX (BMI) OF 60.0 TO 69.9 IN ADULT: Status: ACTIVE | Noted: 2019-09-09

## 2022-03-08 PROCEDURE — 99213 OFFICE O/P EST LOW 20 MIN: CPT | Performed by: FAMILY MEDICINE

## 2022-03-08 RX ORDER — NAPROXEN 500 MG/1
500 TABLET ORAL 2 TIMES DAILY WITH MEALS
Qty: 60 TABLET | Refills: 3 | Status: SHIPPED | OUTPATIENT
Start: 2022-03-08 | End: 2022-04-14

## 2022-03-08 RX ORDER — PREDNISONE 20 MG/1
40 TABLET ORAL DAILY
Qty: 10 TABLET | Refills: 0 | Status: SHIPPED | OUTPATIENT
Start: 2022-03-08 | End: 2022-03-13

## 2022-03-08 NOTE — PROGRESS NOTES
Subjective   Johnny Duran is a 31 y.o. male.   Chief Complaint   Patient presents with   • Foot Pain       1 month history or right foot pain at medial heel. Changed shoes and it is improving slowly but still painful. No numbness    Foot Pain  This is a new problem. The current episode started 1 to 4 weeks ago. The problem occurs constantly. Associated symptoms include arthralgias (right foot pain ). Pertinent negatives include no abdominal pain, chest pain, fatigue, fever, joint swelling, nausea, neck pain, numbness, rash, swollen glands, vomiting or weakness. The symptoms are aggravated by walking and standing. He has tried nothing for the symptoms. The treatment provided no relief.        The following portions of the patient's history were reviewed and updated as appropriate: allergies, current medications, past family history, past medical history, past social history, past surgical history and problem list.    Patient Active Problem List   Diagnosis   • Closed nondisplaced longitudinal fracture of right patella   • Class 3 severe obesity due to excess calories with serious comorbidity and body mass index (BMI) of 60.0 to 69.9 in adult (HCC)   • Nondisplaced fracture of fifth left metatarsal bone   • Traumatic arthritis of right knee   • Dizziness   • Contusion of left foot   • Pain in left foot   • Vitamin D deficiency   • Testosterone deficiency       No current outpatient medications on file prior to visit.     No current facility-administered medications on file prior to visit.     Current outpatient and discharge medications have been reconciled for the patient.  Reviewed by: Edouard Vazquez MD      Allergies   Allergen Reactions   • Codeine Unknown (See Comments)       Review of Systems   Constitutional: Negative for activity change, appetite change, fatigue and fever.   HENT: Negative for ear pain, swollen glands and voice change.    Eyes: Negative for visual disturbance.   Respiratory: Negative  "for shortness of breath and wheezing.    Cardiovascular: Negative for chest pain and leg swelling.   Gastrointestinal: Negative for abdominal pain, blood in stool, constipation, diarrhea, nausea and vomiting.   Endocrine: Negative for polydipsia and polyuria.   Genitourinary: Negative for dysuria, frequency and hematuria.   Musculoskeletal: Positive for arthralgias (right foot pain ). Negative for joint swelling, neck pain and neck stiffness.   Skin: Negative for rash and wound.   Neurological: Negative for weakness, numbness and headache.   Psychiatric/Behavioral: Negative for suicidal ideas and depressed mood.     I have reviewed and confirmed the accuracy of the ROS as documented by the MA/LPN/RN Edouard Vazquez MD    Objective   Visit Vitals  /84 (BP Location: Right arm, Patient Position: Sitting, Cuff Size: Adult)   Pulse 107   Temp 96.4 °F (35.8 °C)   Resp 20   Ht 188 cm (74\")   Wt (!) 219 kg (482 lb 9.6 oz)   SpO2 98%   BMI 61.96 kg/m²       Physical Exam  Constitutional:       Appearance: He is well-developed.   HENT:      Head: Normocephalic and atraumatic.      Right Ear: External ear normal.      Left Ear: External ear normal.      Nose: Nose normal.   Eyes:      Pupils: Pupils are equal, round, and reactive to light.   Cardiovascular:      Rate and Rhythm: Normal rate and regular rhythm.      Heart sounds: Normal heart sounds.   Pulmonary:      Effort: Pulmonary effort is normal.      Breath sounds: Normal breath sounds.   Abdominal:      General: Bowel sounds are normal.      Palpations: Abdomen is soft.   Musculoskeletal:         General: Normal range of motion.      Cervical back: Normal range of motion and neck supple.        Feet:    Feet:      Comments: tenderness  Skin:     General: Skin is warm and dry.   Neurological:      Mental Status: He is alert and oriented to person, place, and time.   Psychiatric:         Behavior: Behavior normal.         Thought Content: Thought content " normal.         Judgment: Judgment normal.       Derm Physical Exam    Assessment/Plan .    Diagnoses and all orders for this visit:    1. Plantar fasciitis of right foot (Primary)  -     predniSONE (DELTASONE) 20 MG tablet; Take 2 tablets by mouth Daily for 5 days.  Dispense: 10 tablet; Refill: 0  -     naproxen (Naprosyn) 500 MG tablet; Take 1 tablet by mouth 2 (Two) Times a Day With Meals.  Dispense: 60 tablet; Refill: 3     Findings discussed. All questions answered.  Stretching, ice discussed. andout given  Follow-up in 4-6 weeks if not better.  Follow-up sooner for worsening symptoms or for any concerns.     May consider podiatry eval if sx persist   I wore protective equipment throughout this patient encounter to include mask and gloves. Hand hygiene was performed before donning protective equipment and after removal when leaving the room

## 2022-03-08 NOTE — EXTERNAL PATIENT INSTRUCTIONS
Patient Education   Table of Contents       Plantar Fasciitis Rehab     To view videos and all your education online visit,   [email or web address]   or scan this QR code with your smartphone.                  Plantar Fasciitis Rehab     Ask your health care provider which exercises are safe for you. Do exercises exactly as told by your health care provider and adjust them as directed. It is normal to feel mild stretching, pulling, tightness, or discomfort as you do these exercises. Stop right away if you feel sudden pain or your pain gets worse. Do not  begin these exercises until told by your health care provider.   Stretching and range-of-motion exercises   These exercises warm up your muscles and joints and improve the movement and flexibility of your foot. These exercises also help to relieve pain.   Plantar fascia stretch          Sit with your left / right leg crossed over your opposite knee.       Hold your heel with one hand with that thumb near your arch. With your other hand, hold your toes and gently pull them back toward the top of your foot. You should feel a stretch on the base (bottom) of your toes, or the bottom of your foot (plantar fascia), or both.       Hold this stretch for__________ seconds.       Slowly release your toes and return to the starting position.   Repeat __________ times. Complete this exercise __________ times a day.   Gastrocnemius stretch, standing       This exercise is also called a calf (gastroc) stretch. It stretches the muscles in the back of the upper calf.     Stand with your hands against a wall.       Extend your left / right leg behind you, and bend your front knee slightly.       Keeping your heels on the floor, your toes facing forward, and your back knee straight, shift your weight toward the wall. Do not  arch your back. You should feel a gentle stretch in your upper calf.       Hold this position for __________ seconds.   Repeat __________ times.  Complete this exercise __________ times a day.     Soleus stretch, standing    This exercise is also called a calf (soleus) stretch. It stretches the muscles in the back of the lower calf.     Stand with your hands against a wall.       Extend your left / right leg behind you, and bend your front knee slightly.       Keeping your heels on the floor and your toes facing forward, bend your back knee and shift your weight slightly over your back leg. You should feel a gentle stretch deep in your lower calf.       Hold this position for __________ seconds.   Repeat __________ times. Complete this exercise __________ times a day.   Gastroc and soleus stretch, standing step    This exercise stretches the muscles in the back of the lower leg. These muscles are in the upper calf (gastrocnemius) and the lower calf (soleus).     Stand with the ball of your left / right foot on the front of a step. The ball of your foot is on the walking surface, right under your toes.       Keep your other foot firmly on the same step.       Hold on to the wall or a railing for balance.       Slowly lift your other foot, allowing your body weight to press your heel down over the edge of the front of the step. Keep knee straight and unbent. You should feel a stretch in your calf.       Hold this position for __________ seconds.       Return both feet to the step.       Repeat this exercise with a slight bend in your left / right knee.   Repeat __________ times with your left / right knee straight and __________ times with your left / right knee bent. Complete this exercise __________ times a day.     Balance exercise   This exercise builds your balance and strength control of your arch to help take pressure off your plantar fascia.   Single leg stand    If this exercise is too easy, you can try it with your eyes closed or while standing on a pillow.     Without shoes, stand near a railing or in a doorway. You may hold on to the railing or door  frame as needed.       Stand on your left / right foot. Keep your big toe down on the floor and lift the arch of your foot. You should feel a stretch across the bottom of your foot and your arch. Do not  let your foot roll inward.       Hold this position for __________ seconds.   Repeat __________ times. Complete this exercise __________ times a day.   This information is not intended to replace advice given to you by your health care provider. Make sure you discuss any questions you have with your health care provider.     Document Released: 12/18/2006Document Revised: 09/30/2021Document Reviewed: 09/30/2021     Elsevier Patient Education ? 2022 [employer]

## 2022-03-08 NOTE — EXTERNAL PATIENT INSTRUCTIONS
Patient Education   Table of Contents       Plantar Fasciitis Rehab     To view videos and all your education online visit,   [email or web address]   or scan this QR code with your smartphone.                  Plantar Fasciitis Rehab     Ask your health care provider which exercises are safe for you. Do exercises exactly as told by your health care provider and adjust them as directed. It is normal to feel mild stretching, pulling, tightness, or discomfort as you do these exercises. Stop right away if you feel sudden pain or your pain gets worse. Do not  begin these exercises until told by your health care provider.   Stretching and range-of-motion exercises   These exercises warm up your muscles and joints and improve the movement and flexibility of your foot. These exercises also help to relieve pain.   Plantar fascia stretch          Sit with your left / right leg crossed over your opposite knee.       Hold your heel with one hand with that thumb near your arch. With your other hand, hold your toes and gently pull them back toward the top of your foot. You should feel a stretch on the base (bottom) of your toes, or the bottom of your foot (plantar fascia), or both.       Hold this stretch for__________ seconds.       Slowly release your toes and return to the starting position.   Repeat __________ times. Complete this exercise __________ times a day.   Gastrocnemius stretch, standing       This exercise is also called a calf (gastroc) stretch. It stretches the muscles in the back of the upper calf.     Stand with your hands against a wall.       Extend your left / right leg behind you, and bend your front knee slightly.       Keeping your heels on the floor, your toes facing forward, and your back knee straight, shift your weight toward the wall. Do not  arch your back. You should feel a gentle stretch in your upper calf.       Hold this position for __________ seconds.   Repeat __________ times.  Complete this exercise __________ times a day.     Soleus stretch, standing    This exercise is also called a calf (soleus) stretch. It stretches the muscles in the back of the lower calf.     Stand with your hands against a wall.       Extend your left / right leg behind you, and bend your front knee slightly.       Keeping your heels on the floor and your toes facing forward, bend your back knee and shift your weight slightly over your back leg. You should feel a gentle stretch deep in your lower calf.       Hold this position for __________ seconds.   Repeat __________ times. Complete this exercise __________ times a day.   Gastroc and soleus stretch, standing step    This exercise stretches the muscles in the back of the lower leg. These muscles are in the upper calf (gastrocnemius) and the lower calf (soleus).     Stand with the ball of your left / right foot on the front of a step. The ball of your foot is on the walking surface, right under your toes.       Keep your other foot firmly on the same step.       Hold on to the wall or a railing for balance.       Slowly lift your other foot, allowing your body weight to press your heel down over the edge of the front of the step. Keep knee straight and unbent. You should feel a stretch in your calf.       Hold this position for __________ seconds.       Return both feet to the step.       Repeat this exercise with a slight bend in your left / right knee.   Repeat __________ times with your left / right knee straight and __________ times with your left / right knee bent. Complete this exercise __________ times a day.     Balance exercise   This exercise builds your balance and strength control of your arch to help take pressure off your plantar fascia.   Single leg stand    If this exercise is too easy, you can try it with your eyes closed or while standing on a pillow.     Without shoes, stand near a railing or in a doorway. You may hold on to the railing or door  frame as needed.       Stand on your left / right foot. Keep your big toe down on the floor and lift the arch of your foot. You should feel a stretch across the bottom of your foot and your arch. Do not  let your foot roll inward.       Hold this position for __________ seconds.   Repeat __________ times. Complete this exercise __________ times a day.   This information is not intended to replace advice given to you by your health care provider. Make sure you discuss any questions you have with your health care provider.     Document Released: 12/18/2006Document Revised: 09/30/2021Document Reviewed: 09/30/2021     Elsevier Patient Education ? 2022 [employer] Inc[employer]

## 2022-03-21 ENCOUNTER — TELEPHONE (OUTPATIENT)
Dept: FAMILY MEDICINE CLINIC | Facility: CLINIC | Age: 32
End: 2022-03-21

## 2022-03-21 ENCOUNTER — HOSPITAL ENCOUNTER (OUTPATIENT)
Dept: GENERAL RADIOLOGY | Facility: HOSPITAL | Age: 32
Discharge: HOME | End: 2022-03-21
Admitting: FAMILY MEDICINE

## 2022-03-21 ENCOUNTER — OFFICE VISIT (OUTPATIENT)
Dept: FAMILY MEDICINE CLINIC | Facility: CLINIC | Age: 32
End: 2022-03-21

## 2022-03-21 VITALS
DIASTOLIC BLOOD PRESSURE: 86 MMHG | OXYGEN SATURATION: 96 % | TEMPERATURE: 97.6 F | RESPIRATION RATE: 18 BRPM | BODY MASS INDEX: 40.43 KG/M2 | SYSTOLIC BLOOD PRESSURE: 120 MMHG | HEIGHT: 74 IN | WEIGHT: 315 LBS | HEART RATE: 113 BPM

## 2022-03-21 DIAGNOSIS — M72.2 PLANTAR FASCIITIS OF RIGHT FOOT: Primary | ICD-10-CM

## 2022-03-21 DIAGNOSIS — M79.671 RIGHT FOOT PAIN: ICD-10-CM

## 2022-03-21 DIAGNOSIS — E66.01 CLASS 3 SEVERE OBESITY DUE TO EXCESS CALORIES WITH SERIOUS COMORBIDITY AND BODY MASS INDEX (BMI) OF 60.0 TO 69.9 IN ADULT: ICD-10-CM

## 2022-03-21 PROCEDURE — 73630 X-RAY EXAM OF FOOT: CPT

## 2022-03-21 PROCEDURE — 96372 THER/PROPH/DIAG INJ SC/IM: CPT | Performed by: FAMILY MEDICINE

## 2022-03-21 PROCEDURE — 99214 OFFICE O/P EST MOD 30 MIN: CPT | Performed by: FAMILY MEDICINE

## 2022-03-21 RX ORDER — INDOMETHACIN 50 MG/1
50 CAPSULE ORAL
Qty: 42 CAPSULE | Refills: 0 | Status: SHIPPED | OUTPATIENT
Start: 2022-03-21 | End: 2022-04-04

## 2022-03-21 RX ORDER — KETOROLAC TROMETHAMINE 30 MG/ML
60 INJECTION, SOLUTION INTRAMUSCULAR; INTRAVENOUS ONCE
Status: COMPLETED | OUTPATIENT
Start: 2022-03-21 | End: 2022-03-21

## 2022-03-21 RX ADMIN — KETOROLAC TROMETHAMINE 60 MG: 30 INJECTION, SOLUTION INTRAMUSCULAR; INTRAVENOUS at 11:58

## 2022-03-21 NOTE — TELEPHONE ENCOUNTER
----- Message from Edouard Vazquez MD sent at 3/21/2022  5:03 PM EDT -----  Please notify patient that:   Xray was normal

## 2022-03-21 NOTE — PROGRESS NOTES
Subjective   Johnny Duran is a 31 y.o. male.   Chief Complaint   Patient presents with   • Foot Pain       1 month history or right foot pain at medial heel.     Foot Pain  This is a recurrent problem. The current episode started 1 to 4 weeks ago. The problem occurs constantly. Associated symptoms include arthralgias (right foot pain ). Pertinent negatives include no abdominal pain, chest pain, fatigue, fever, joint swelling, nausea, neck pain, numbness, rash, swollen glands, vomiting or weakness. The symptoms are aggravated by walking and standing. He has tried nothing for the symptoms. The treatment provided no relief.        The following portions of the patient's history were reviewed and updated as appropriate: allergies, current medications, past family history, past medical history, past social history, past surgical history and problem list.    Patient Active Problem List   Diagnosis   • Closed nondisplaced longitudinal fracture of right patella   • Class 3 severe obesity due to excess calories with serious comorbidity and body mass index (BMI) of 60.0 to 69.9 in adult (HCC)   • Nondisplaced fracture of fifth left metatarsal bone   • Traumatic arthritis of right knee   • Dizziness   • Contusion of left foot   • Pain in left foot   • Vitamin D deficiency   • Testosterone deficiency       Current Outpatient Medications on File Prior to Visit   Medication Sig Dispense Refill   • naproxen (Naprosyn) 500 MG tablet Take 1 tablet by mouth 2 (Two) Times a Day With Meals. 60 tablet 3     No current facility-administered medications on file prior to visit.     Current outpatient and discharge medications have been reconciled for the patient.  Reviewed by: Edouard Vazquez MD      Allergies   Allergen Reactions   • Codeine Unknown (See Comments)       Review of Systems   Constitutional: Negative for fatigue and fever.   HENT: Negative for swollen glands.    Cardiovascular: Negative for chest pain.  "  Gastrointestinal: Negative for abdominal pain, nausea and vomiting.   Musculoskeletal: Positive for arthralgias (right foot pain ). Negative for joint swelling and neck pain.   Skin: Negative for rash.   Neurological: Negative for weakness and numbness.     I have reviewed and confirmed the accuracy of the ROS as documented by the MA/LPN/RN Edouard Vazquez MD    Objective   Visit Vitals  /86 (BP Location: Right arm, Patient Position: Sitting, Cuff Size: Adult)   Pulse 113   Temp 97.6 °F (36.4 °C)   Resp 18   Ht 188 cm (74\")   Wt (!) 218 kg (479 lb 12.8 oz)   SpO2 96%   BMI 61.60 kg/m²       Physical Exam  Derm Physical Exam    Assessment/Plan .    Diagnoses and all orders for this visit:    1. Plantar fasciitis of right foot (Primary)  -     Ambulatory Referral to Podiatry  -     indomethacin (INDOCIN) 50 MG capsule; Take 1 capsule by mouth 3 (Three) Times a Day With Meals for 14 days.  Dispense: 42 capsule; Refill: 0  -     ketorolac (TORADOL) injection 60 mg    2. Class 3 severe obesity due to excess calories with serious comorbidity and body mass index (BMI) of 60.0 to 69.9 in adult ([facility])    3. Right foot pain  -     Ambulatory Referral to Podiatry  -     XR Foot 3+ View Right  -     indomethacin (INDOCIN) 50 MG capsule; Take 1 capsule by mouth 3 (Three) Times a Day With Meals for 14 days.  Dispense: 42 capsule; Refill: 0     Findings discussed. All questions answered.  Medication and medication adverse effects discussed.  Drug education given and explained to patient. Patient verbalized understanding.  Saira  Follow-up in 4-6 weeks if not better.  Follow-up sooner for worsening symptoms or for any concerns.         I wore protective equipment throughout this patient encounter to include mask and gloves. Hand hygiene was performed before donning protective equipment and after removal when leaving the room     "

## 2022-04-14 ENCOUNTER — OFFICE VISIT (OUTPATIENT)
Dept: PODIATRY | Facility: CLINIC | Age: 32
End: 2022-04-14

## 2022-04-14 VITALS
HEART RATE: 82 BPM | DIASTOLIC BLOOD PRESSURE: 87 MMHG | HEIGHT: 74 IN | BODY MASS INDEX: 40.43 KG/M2 | SYSTOLIC BLOOD PRESSURE: 147 MMHG | WEIGHT: 315 LBS

## 2022-04-14 DIAGNOSIS — M79.671 RIGHT FOOT PAIN: Primary | ICD-10-CM

## 2022-04-14 DIAGNOSIS — M72.2 PLANTAR FASCIITIS OF RIGHT FOOT: ICD-10-CM

## 2022-04-14 DIAGNOSIS — E66.01 MORBID OBESITY WITH BMI OF 60.0-69.9, ADULT: ICD-10-CM

## 2022-04-14 PROCEDURE — 20550 NJX 1 TENDON SHEATH/LIGAMENT: CPT | Performed by: PODIATRIST

## 2022-04-14 PROCEDURE — 99203 OFFICE O/P NEW LOW 30 MIN: CPT | Performed by: PODIATRIST

## 2022-04-14 PROCEDURE — 76942 ECHO GUIDE FOR BIOPSY: CPT | Performed by: PODIATRIST

## 2022-04-14 RX ORDER — TRIAMCINOLONE ACETONIDE 40 MG/ML
20 INJECTION, SUSPENSION INTRA-ARTICULAR; INTRAMUSCULAR ONCE
Status: COMPLETED | OUTPATIENT
Start: 2022-04-14 | End: 2022-04-14

## 2022-04-14 RX ADMIN — TRIAMCINOLONE ACETONIDE 20 MG: 40 INJECTION, SUSPENSION INTRA-ARTICULAR; INTRAMUSCULAR at 11:29

## 2022-04-14 NOTE — PROGRESS NOTES
04/14/2022  Foot and Ankle Surgery - New Patient   Provider: Dr. Savage Berman DPM  Location: [facility]    Subjective:  Johnny Duran is a 31 y.o. male.     Chief Complaint   Patient presents with   • Right Foot - Pain   • Establish Care     Last pcp appt with CARLOS Vazquez MD 3/21/2022       HPI: Patient is a 31-year-old male that was referred to me by his primary care physician for evaluation of his right foot pain.  Patient has had prominent discomfort involving the heel region for several months.  He has tried multiple oral anti-inflammatories without any significant improvement.  He is unaware of any injury.  He notes discomfort after standing on concrete for long periods of time.  He is concerned that the symptoms will progress and cause further limitation.  Patient has not been formally treated for plantar fasciitis in the past.    Allergies   Allergen Reactions   • Codeine Unknown (See Comments)       Past Medical History:   Diagnosis Date   • Acute epigastric pain    • Acute pain of right knee     Impression: likely contused. Findings discussed. All questions answered. Medication and medication adverse effects discussed. Drug education given and explained to patient. Patient verbalized understanding.   • Anemia    • Encounter for screening for malignant neoplasm of rectum    • Gallstones    • Nausea and vomiting in adult    • Nondisplaced fracture of fifth left metatarsal bone    • Nondisplaced longitudinal fracture of right patella, initial encounter for closed fracture     Impression: recommend ortho eval, appt today.   • Obesity    • Obesity, morbid, BMI 40.0-49.9 ([facility])    • Overweight (BMI 25.0-29.9)    • Screening for depression    • Screening for hyperlipidemia    • Stress fracture    • Traumatic arthritis of right knee        History reviewed. No pertinent surgical history.    Family History   Problem Relation Age of Onset   • Diabetes Father    • Diabetes Paternal Grandfather   "      Social History     Socioeconomic History   • Marital status: Single   Tobacco Use   • Smoking status: Never Smoker   • Smokeless tobacco: Never Used   Vaping Use   • Vaping Use: Never used   Substance and Sexual Activity   • Alcohol use: Yes     Comment: Drinks beer, Drinks hard liquor. 6 drinks monthly   • Drug use: No   • Sexual activity: Defer        Current Outpatient Medications on File Prior to Visit   Medication Sig Dispense Refill   • [DISCONTINUED] naproxen (Naprosyn) 500 MG tablet Take 1 tablet by mouth 2 (Two) Times a Day With Meals. 60 tablet 3     No current facility-administered medications on file prior to visit.       Review of Systems:  General: Denies fever, chills, fatigue, and weakness.  Eyes: Denies vision loss, blurry vision, and excessive redness.  ENT: Denies hearing issues and difficulty swallowing.  Cardiovascular: Denies palpitations, chest pain, or syncopal episodes.  Respiratory: Denies shortness of breath, wheezing, and coughing.  GI: Denies abdominal pain, nausea, and vomiting.   : Denies frequency, hematuria, and urgency.  Musculoskeletal: + Right foot pain  Derm: Denies rash, open wounds, or suspicious lesions.  Neuro: Denies headaches, numbness, loss of coordination, and tremors.  Psych: Denies anxiety and depression.  Endocrine: Denies temperature intolerance and changes in appetite.  Heme: Denies bleeding disorders or abnormal bruising.     Objective   /87   Pulse 82   Ht 188 cm (74\")   Wt (!) 218 kg (479 lb 12.8 oz)   BMI 61.60 kg/m²     Foot/Ankle Exam:       General:   Appearance: appears stated age and healthy and obesity    Orientation: AAOx3    Affect: appropriate    Gait: antalgic      VASCULAR      Right Foot Vascularity   Normal vascular exam    Dorsalis pedis:  2+  Posterior tibial:  2+  Skin Temperature: warm    Edema Grading:  None  CFT:  < 3 seconds  Pedal Hair Growth:  Present  Varicosities: none        NEUROLOGIC     Right Foot Neurologic   Light " touch sensation:  Normal  Hot/Cold sensation: normal    Achilles reflex:  2+     MUSCULOSKELETAL      Right Foot Musculoskeletal   Ecchymosis:  None  Tenderness: plantar fascia and plantar heel    Arch:  Normal     MUSCLE STRENGTH     Right Foot Muscle Strength   Normal strength    Foot dorsiflexion:  5  Foot plantar flexion:  5  Foot inversion:  5  Foot eversion:  5     DERMATOLOGIC     Right Foot Dermatologic   Skin: skin intact        Right Foot Additional Comments Significant pain with palpation involving the plantar medial aspect of the calcaneal tuberosity.  No gross deformity or instability.      Assessment/Plan   Diagnoses and all orders for this visit:    1. Right foot pain (Primary)    2. Plantar fasciitis of right foot  -     triamcinolone acetonide (KENALOG-40) injection 20 mg  -     Injection Tendon or Ligament    3. Morbid obesity with BMI of 60.0-69.9, adult (HCC)      Patient is a morbidly obese male that presents with significant pain involving the plantar aspect of the right heel.  He denies any recent injury.  Imaging was reviewed showing prominent heel spur involving the inferior aspect of the calcaneus.  No fractures or dislocations are identified.  I discussed the imaging, diagnosis, and treatment options with the patient at length.  We did review consideration for steroid injection for symptom management.  We reviewed the procedure and risks.  Injection was performed under ultrasound guidance without complication.  I have asked that he consider a pair of over-the-counter arch supports.  We did review proper use and effects.  He is to avoid barefoot and unsupported weightbearing.  I would also like him to start stretching manual therapy exercises.  We did review the importance of weight loss.  We discussed proper use of OTC anti-inflammatories and rice therapy when needed.  Patient is to monitor and return in 4 weeks for reevaluation.  Greater than 30 minutes was spent before, during, and after  evaluation for patient care.    Plantar Fascia Steroid Injection: Right    Consent and time out was performed before proceeding with the procedure.  The area of maximal tenderness was palpated near the plantar medial calcaneal tuberosity of right foot.  The area was cleansed with alcohol.  Under ultrasound guidance, the plantar fascia was visualized and a solution totaling 1.5 mL was injected about the origin of the plantar fascia.  The solution contained 0.5 mL of 1% lidocaine plain, 0.5 mL of 0.5% Marcaine plain, and 0.5 mL of Kenalog.  After the injection, the patient noted immediate pain relief.  Mild compression was placed at the injection site followed by a sterile bandage.  The patient tolerated the injection well without complication.      Orders Placed This Encounter   Procedures   • Injection Tendon or Ligament     Order Specific Question:   Release to patient     Answer:   Immediate        Note is dictated utilizing voice recognition software. Unfortunately this leads to occasional typographical errors. I apologize in advance if the situation occurs. If questions occur please do not hesitate to call our office.

## 2022-04-15 ENCOUNTER — PATIENT ROUNDING (BHMG ONLY) (OUTPATIENT)
Dept: PODIATRY | Facility: CLINIC | Age: 32
End: 2022-04-15

## 2022-04-15 NOTE — PROGRESS NOTES
A my chart message has been sent to the patient for PATIENT ROUNDING with [facility]

## 2022-05-12 ENCOUNTER — OFFICE VISIT (OUTPATIENT)
Dept: PODIATRY | Facility: CLINIC | Age: 32
End: 2022-05-12

## 2022-05-12 VITALS
DIASTOLIC BLOOD PRESSURE: 78 MMHG | BODY MASS INDEX: 40.43 KG/M2 | HEART RATE: 71 BPM | SYSTOLIC BLOOD PRESSURE: 123 MMHG | HEIGHT: 74 IN | WEIGHT: 315 LBS

## 2022-05-12 DIAGNOSIS — E66.01 MORBID OBESITY WITH BMI OF 60.0-69.9, ADULT: ICD-10-CM

## 2022-05-12 DIAGNOSIS — M72.2 PLANTAR FASCIITIS OF RIGHT FOOT: ICD-10-CM

## 2022-05-12 DIAGNOSIS — M79.671 RIGHT FOOT PAIN: Primary | ICD-10-CM

## 2022-05-12 PROCEDURE — 99213 OFFICE O/P EST LOW 20 MIN: CPT | Performed by: PODIATRIST

## 2022-05-12 RX ORDER — METHYLPREDNISOLONE 4 MG/1
TABLET ORAL
Qty: 21 TABLET | Refills: 0 | Status: SHIPPED | OUTPATIENT
Start: 2022-05-12 | End: 2022-07-07

## 2022-05-12 NOTE — PROGRESS NOTES
"05/12/2022  Foot and Ankle Surgery - Established Patient/Follow-up  Provider: Dr. Savage Berman DPM  Location: [facility]    Subjective:  Johnny Duran is a 31 y.o. male.     Chief Complaint   Patient presents with   • Right Foot - Follow-up     Plantar Faciitis   • Establish Care     Last visit PCP Dr CARLOS Vazquez  03/2022       HPI:     The patient returns to the clinic for follow up on right foot pain. He reports that he had minimal improvement of pain with the injection that he had on 04/2022 however he went back to work and with long standing duties the pain gets worse.    Allergies   Allergen Reactions   • Codeine Unknown (See Comments)       No current outpatient medications on file prior to visit.     No current facility-administered medications on file prior to visit.       Objective   /78   Pulse 71   Ht 188 cm (74\")   Wt (!) 217 kg (479 lb)   BMI 61.50 kg/m²     Foot/Ankle Exam:       General:   Appearance: appears stated age and healthy and obesity    Orientation: AAOx3    Affect: appropriate    Gait: antalgic      VASCULAR      Right Foot Vascularity   Normal vascular exam    Dorsalis pedis:  2+  Posterior tibial:  2+  Skin Temperature: warm    Edema Grading:  None  CFT:  < 3 seconds  Pedal Hair Growth:  Present  Varicosities: none        NEUROLOGIC     Right Foot Neurologic   Light touch sensation:  Normal  Hot/Cold sensation: normal    Achilles reflex:  2+     MUSCULOSKELETAL      Right Foot Musculoskeletal   Ecchymosis:  None  Tenderness: plantar fascia and plantar heel    Arch:  Normal     MUSCLE STRENGTH     Right Foot Muscle Strength   Normal strength    Foot dorsiflexion:  5  Foot plantar flexion:  5  Foot inversion:  5  Foot eversion:  5     DERMATOLOGIC     Right Foot Dermatologic   Skin: skin intact        Right Foot Additional Comments Significant pain with palpation involving the plantar medial aspect of the calcaneal tuberosity.  No gross deformity or instability.   "         Assessment & Plan   Diagnoses and all orders for this visit:    1. Right foot pain (Primary)    2. Plantar fasciitis of right foot  -     Ambulatory Referral to Physical Therapy Evaluate and treat    3. Morbid obesity with BMI of 60.0-69.9, adult (HCC)    Other orders  -     methylPREDNISolone (MEDROL) 4 MG dose pack; Take as directed  Dispense: 21 tablet; Refill: 0        Patient continues to have moderate discomfort involving the plantar aspect of the heel and has not noticed any significant improvement after previous exam.  I have prescribed a Medrol Dosepak for symptom management.  I have also requested that he start formal physical therapy.  Referral has been made.  I would like him to continue wearing appropriate shoes and performing at home exercises.  He is to monitor closely and return in 6 weeks for reevaluation.  Greater than 20 minutes was spent before, during, and after evaluation for patient care    Orders Placed This Encounter   Procedures   • Ambulatory Referral to Physical Therapy Evaluate and treat     Referral Priority:   Routine     Referral Type:   Physical Therapy     Referral Reason:   Specialty Services Required     Referral Location:   [facility] [place]     Requested Specialty:   Physical Therapy     Number of Visits Requested:   1          Note is dictated utilizing voice recognition software. Unfortunately this leads to occasional typographical errors. I apologize in advance if the situation occurs. If questions occur please do not hesitate to call our office.    Transcribed from ambient dictation for CASEY Berman DPM by Misha Colin.  05/12/22   12:09 EDT    Patient verbalized consent to the visit recording.

## 2022-07-07 ENCOUNTER — OFFICE VISIT (OUTPATIENT)
Dept: PODIATRY | Facility: CLINIC | Age: 32
End: 2022-07-07

## 2022-07-07 VITALS
HEART RATE: 93 BPM | HEIGHT: 74 IN | SYSTOLIC BLOOD PRESSURE: 143 MMHG | WEIGHT: 315 LBS | BODY MASS INDEX: 40.43 KG/M2 | DIASTOLIC BLOOD PRESSURE: 81 MMHG

## 2022-07-07 DIAGNOSIS — M79.671 RIGHT FOOT PAIN: Primary | ICD-10-CM

## 2022-07-07 DIAGNOSIS — E66.01 MORBID OBESITY WITH BMI OF 60.0-69.9, ADULT: ICD-10-CM

## 2022-07-07 DIAGNOSIS — M72.2 PLANTAR FASCIITIS OF RIGHT FOOT: ICD-10-CM

## 2022-07-07 PROCEDURE — 76942 ECHO GUIDE FOR BIOPSY: CPT | Performed by: PODIATRIST

## 2022-07-07 PROCEDURE — 99213 OFFICE O/P EST LOW 20 MIN: CPT | Performed by: PODIATRIST

## 2022-07-07 PROCEDURE — 20550 NJX 1 TENDON SHEATH/LIGAMENT: CPT | Performed by: PODIATRIST

## 2022-07-07 RX ORDER — TRIAMCINOLONE ACETONIDE 40 MG/ML
20 INJECTION, SUSPENSION INTRA-ARTICULAR; INTRAMUSCULAR ONCE
Status: COMPLETED | OUTPATIENT
Start: 2022-07-07 | End: 2022-07-07

## 2022-07-07 RX ADMIN — TRIAMCINOLONE ACETONIDE 20 MG: 40 INJECTION, SUSPENSION INTRA-ARTICULAR; INTRAMUSCULAR at 14:30

## 2022-07-07 NOTE — PROGRESS NOTES
"07/07/2022  Foot and Ankle Surgery - Established Patient/Follow-up  Provider: Dr. Savage Berman DPM  Location: [facility]    Subjective:  Johnny Duran is a 32 y.o. male.     Chief Complaint   Patient presents with   • Right Foot - Pain   • Follow-up     CARLOS Vazquez md 3/21/2022       HPI:     The patient is a 32-year-old male who returns for follow-up regarding his right foot. He was last seen 6 to 8 weeks ago when he was prescribed a steroid pack. He states that he has constant paresthesia in one foot. He also complains of paresthesia on the calcaneal aspect of his other foot that refers a \"shooting\" pain. The patient adds that he is having difficulty ambulating, although rest alleviates his symptoms. He reports that he has pain with his 1st steps following a period of rest. He adds that he has considered weight-loss surgery; however, he has a friend who underwent this surgery who has now gained the weight back.     Allergies   Allergen Reactions   • Codeine Unknown (See Comments)       No current outpatient medications on file prior to visit.     No current facility-administered medications on file prior to visit.       Objective   /81   Pulse 93   Ht 188 cm (74\")   Wt (!) 217 kg (479 lb)   BMI 61.50 kg/m²     Foot/Ankle Exam:       General:   Appearance: appears stated age and healthy and obesity    Orientation: AAOx3    Affect: appropriate    Gait: antalgic      VASCULAR      Right Foot Vascularity   Normal vascular exam    Dorsalis pedis:  2+  Posterior tibial:  2+  Skin Temperature: warm    Edema Grading:  None  CFT:  < 3 seconds  Pedal Hair Growth:  Present  Varicosities: none        NEUROLOGIC     Right Foot Neurologic   Light touch sensation:  Normal  Hot/Cold sensation: normal    Achilles reflex:  2+     MUSCULOSKELETAL      Right Foot Musculoskeletal   Ecchymosis:  None  Tenderness: plantar fascia and plantar heel    Arch:  Normal     MUSCLE STRENGTH     Right Foot Muscle Strength "   Normal strength    Foot dorsiflexion:  5  Foot plantar flexion:  5  Foot inversion:  5  Foot eversion:  5     DERMATOLOGIC     Right Foot Dermatologic   Skin: skin intact        Right Foot Additional Comments Significant pain with palpation involving the plantar medial aspect of the calcaneal tuberosity.  No gross deformity or instability.      Assessment & Plan   Diagnoses and all orders for this visit:    1. Right foot pain (Primary)    2. Plantar fasciitis of right foot  -     Injection Tendon or Ligament  -     triamcinolone acetonide (KENALOG-40) injection 20 mg    3. Morbid obesity with BMI of 60.0-69.9, adult (HCC)  -     Ambulatory Referral to Bariatric Surgery        The patient is a 32-year-old male who returns for follow-up regarding his right foot. He continues to have functional issues affecting his right foot due to the stresses of gravity, weight, and activity causing compensation. I discussed the importance of incorporating diet and low-impact exercise, including the elliptical, water aerobics, swimming, and stationary biking, to manage his symptoms prior to considering surgical intervention. We did discuss that increased weight contributes to difficulty with managing these types of symptoms. Weight-loss surgery was discussed as an option to consider, and the patient was provided with a referral to Dr. Munoz, bariatric surgeon along with his contact information. A steroid injection was given today for symptom relief.  Patient is to return in 6 weeks for reevaluation greater than 20 minutes was spent before, during, and after evaluation for patient care.     Orders Placed This Encounter   Procedures   • Injection Tendon or Ligament     Order Specific Question:   Release to patient     Answer:   Immediate   • Ambulatory Referral to Bariatric Surgery     Referral Priority:   Routine     Referral Type:   Consultation     Referral Reason:   Specialty Services Required     Referred to Provider:   Alexander  MD Maribell     Requested Specialty:   Bariatrics     Number of Visits Requested:   1          Note is dictated utilizing voice recognition software. Unfortunately this leads to occasional typographical errors. I apologize in advance if the situation occurs. If questions occur please do not hesitate to call our office.    Transcribed from ambient dictation for CASEY Berman DPM by ARNULFO BROOKS.  07/07/22   15:25 EDT    Patient verbalized consent to the visit recording.  I have personally performed the services described in this document as transcribed by the above individual, and it is both accurate and complete.  CASEY Berman DPM  7/8/2022  11:54 EDT

## 2022-07-07 NOTE — PROGRESS NOTES
07/07/2022    Plantar Fascia Steroid Injection: Right foot    Consent and time out was performed before proceeding with the procedure.  The area of maximal tenderness was palpated near the plantar medial calcaneal tuberosity of the right foot.  The area was cleansed with alcohol.  Under ultrasound guidance, the plantar fascia was visualized and a solution totaling 1.5 mL was injected about the origin of the plantar fascia.  The solution contained 0.5 mL of 1% lidocaine plain, 0.5 mL of 0.5% Marcaine plain, and 0.5 mL of Kenalog.  After the injection, the patient noted immediate pain relief.  Mild compression was placed at the injection site followed by a sterile bandage.  The patient tolerated the injection well without complication.                                        Transcribed from ambient dictation for CASEY Berman DPM by ARNULFO BROOKS.  07/07/22   15:28 EDT    Patient verbalized consent to the visit recording.  I have personally performed the services described in this document as transcribed by the above individual, and it is both accurate and complete.  CASEY Berman DPM  7/8/2022  11:53 EDT

## 2022-09-20 ENCOUNTER — OFFICE VISIT (OUTPATIENT)
Dept: PODIATRY | Facility: CLINIC | Age: 32
End: 2022-09-20

## 2022-09-20 VITALS — HEIGHT: 74 IN | WEIGHT: 315 LBS | RESPIRATION RATE: 20 BRPM | BODY MASS INDEX: 40.43 KG/M2

## 2022-09-20 DIAGNOSIS — M72.2 PLANTAR FASCIITIS OF RIGHT FOOT: ICD-10-CM

## 2022-09-20 DIAGNOSIS — E66.01 MORBID OBESITY WITH BMI OF 60.0-69.9, ADULT: ICD-10-CM

## 2022-09-20 DIAGNOSIS — M79.671 RIGHT FOOT PAIN: Primary | ICD-10-CM

## 2022-09-20 PROCEDURE — 99214 OFFICE O/P EST MOD 30 MIN: CPT | Performed by: PODIATRIST

## 2022-09-20 NOTE — PROGRESS NOTES
"2022  Foot and Ankle Surgery - Established Patient/Follow-up  Provider: Dr. Savage Berman DPM  Location: [facility]    Subjective:  Johnny Duran is a 32 y.o. male.     Chief Complaint   Patient presents with   • Right Foot - Pain     Not any better   • Follow-up     CARLOS Vazquez md 3/21/2022       HPI:   The patient is a 32-year-old male who presents to the office today for a follow-up regarding his right foot.    He acknowledges being last seen in the clinic on 2022. He states that he was supposed to be seen approximately 2 weeks ago; however, he had a . He reports that the injection helped for a short period of time, which was the second injection he had. The patient states that the pain is still isolated to the lateral aspect of his right foot. He notes that he tried physical therapy; however, no one called him back. He reports that he has been performing exercises, which he downloaded off of the internet. Additionally, he would be unable to attend physical therapy secondary to work. He states he has been rubbing his knee and keeping his knee straight when he is doing his exercises. The patient states that he is currently on sit down duty at work.    He states that he had surgery on his left foot performed by Dr. Camilo with a plate.    Allergies   Allergen Reactions   • Codeine Unknown (See Comments)       No current outpatient medications on file prior to visit.     No current facility-administered medications on file prior to visit.       Objective   Resp 20   Ht 188 cm (74\")   Wt (!) 217 kg (479 lb)   BMI 61.50 kg/m²     Foot/Ankle Exam:       General:   Appearance: appears stated age and healthy and obesity    Orientation: AAOx3    Affect: appropriate    Gait: antalgic      VASCULAR      Right Foot Vascularity   Normal vascular exam    Dorsalis pedis:  2+  Posterior tibial:  2+  Skin Temperature: warm    Edema Grading:  None  CFT:  < 3 seconds  Pedal Hair Growth:  " Present  Varicosities: none        NEUROLOGIC     Right Foot Neurologic   Light touch sensation:  Normal  Hot/Cold sensation: normal    Achilles reflex:  2+     MUSCULOSKELETAL      Right Foot Musculoskeletal   Ecchymosis:  None  Tenderness: plantar fascia and plantar heel    Arch:  Normal     MUSCLE STRENGTH     Right Foot Muscle Strength   Normal strength    Foot dorsiflexion:  5  Foot plantar flexion:  5  Foot inversion:  5  Foot eversion:  5     DERMATOLOGIC     Right Foot Dermatologic   Skin: skin intact        Right Foot Additional Comments Significant pain with palpation involving the plantar medial aspect of the calcaneal tuberosity.  No gross deformity or instability.      Assessment & Plan   Diagnoses and all orders for this visit:    1. Right foot pain (Primary)    2. Plantar fasciitis of right foot  -     CBC (No Diff); Future  -     Basic Metabolic Panel; Future  -     ECG 12 Lead; Future  -     XR Chest 2 View; Future  -     Case Request; Standing  -     Case Request    3. Morbid obesity with BMI of 60.0-69.9, adult (HCC)    Other orders  -     Follow Anesthesia Guidelines / Standing Orders; Future  -     Obtain Informed Consent; Future  -     Provide NPO Instructions to Patient; Future  -     Chlorhexidine Skin Prep; Future      - We discussed placing him in a boot with an insole until further notice, in the interim of his surgical procedure. Surgery was discussed with risks versus benefits discussed in detail. The patient intends to proceed with surgery and will discuss short term disability with his employers with the understanding he will be off at a minimum of 6 weeks. Surgery is outpatient surgery, with the patient going home the same day. He will require non-weight bearing for a minimum of 2 weeks with a knee scooter, crutches, or a wheelchair, as well as be in a CAM boot for approximately 6 weeks. My surgery scheduler will call and we can tentatively plan a date. CAM boot will be given to the  patient today. Greater than 30 minutes spent before, during, and after evaluation for patient care.    Orders Placed This Encounter   Procedures   • XR Chest 2 View     Standing Status:   Future     Standing Expiration Date:   9/21/2023     Order Specific Question:   Reason for Exam:     Answer:   Preop   • CBC (No Diff)     Standing Status:   Future     Standing Expiration Date:   9/21/2023     Order Specific Question:   Release to patient     Answer:   Routine Release   • Basic Metabolic Panel     Standing Status:   Future     Standing Expiration Date:   9/21/2023     Order Specific Question:   Release to patient     Answer:   Routine Release   • Follow Anesthesia Guidelines / Standing Orders     Standing Status:   Future   • Obtain Informed Consent     Standing Status:   Future     Order Specific Question:   Informed Consent Given For     Answer:   Endoscopic plantar fasciotomy to the right lower extremity   • Provide NPO Instructions to Patient     Standing Status:   Future   • Chlorhexidine Skin Prep     Chlorhexidine Skin Prep and Instructions For All Patients Having A Procedure Requiring an Outward Incision if Not Allergic. If Allergic, Give Antibacterial Skin Wipes and Instructions. Do Not Use For Facial Cases or on Any Mucus Membranes.     Standing Status:   Future   • ECG 12 Lead     Standing Status:   Future     Standing Expiration Date:   9/21/2023     Order Specific Question:   Reason for Exam:     Answer:   Preop          Note is dictated utilizing voice recognition software. Unfortunately this leads to occasional typographical errors. I apologize in advance if the situation occurs. If questions occur please do not hesitate to call our office.    Transcribed from ambient dictation for CASEY Berman DPM by Uzair Pablo.  09/20/22   08:46 EDT      Patient verbalized consent to the visit recording.

## 2022-09-21 PROBLEM — M72.2 PLANTAR FASCIITIS OF RIGHT FOOT: Status: ACTIVE | Noted: 2022-09-21

## 2022-09-26 ENCOUNTER — HOSPITAL ENCOUNTER (OUTPATIENT)
Dept: CARDIOLOGY | Facility: HOSPITAL | Age: 32
Discharge: HOME | End: 2022-09-26

## 2022-09-26 ENCOUNTER — TELEPHONE (OUTPATIENT)
Dept: ORTHOPEDIC SURGERY | Facility: CLINIC | Age: 32
End: 2022-09-26

## 2022-09-26 ENCOUNTER — LAB (OUTPATIENT)
Dept: LAB | Facility: HOSPITAL | Age: 32
End: 2022-09-26

## 2022-09-26 ENCOUNTER — HOSPITAL ENCOUNTER (OUTPATIENT)
Dept: GENERAL RADIOLOGY | Facility: HOSPITAL | Age: 32
Discharge: HOME | End: 2022-09-26

## 2022-09-26 DIAGNOSIS — M72.2 PLANTAR FASCIITIS OF RIGHT FOOT: ICD-10-CM

## 2022-09-26 LAB
ANION GAP SERPL CALCULATED.3IONS-SCNC: 7.8 MMOL/L (ref 5–15)
BUN SERPL-MCNC: 9 MG/DL (ref 6–20)
BUN/CREAT SERPL: 15.5 (ref 7–25)
CALCIUM SPEC-SCNC: 9.1 MG/DL (ref 8.6–10.5)
CHLORIDE SERPL-SCNC: 106 MMOL/L (ref 98–107)
CO2 SERPL-SCNC: 26.2 MMOL/L (ref 22–29)
CREAT SERPL-MCNC: 0.58 MG/DL (ref 0.76–1.27)
DEPRECATED RDW RBC AUTO: 41.4 FL (ref 37–54)
EGFRCR SERPLBLD CKD-EPI 2021: 132.9 ML/MIN/1.73
ERYTHROCYTE [DISTWIDTH] IN BLOOD BY AUTOMATED COUNT: 12.9 % (ref 12.3–15.4)
GLUCOSE SERPL-MCNC: 101 MG/DL (ref 65–99)
HCT VFR BLD AUTO: 35.4 % (ref 37.5–51)
HGB BLD-MCNC: 11.2 G/DL (ref 13–17.7)
MCH RBC QN AUTO: 27.8 PG (ref 26.6–33)
MCHC RBC AUTO-ENTMCNC: 31.6 G/DL (ref 31.5–35.7)
MCV RBC AUTO: 87.8 FL (ref 79–97)
PLATELET # BLD AUTO: 353 10*3/MM3 (ref 140–450)
PMV BLD AUTO: 9.8 FL (ref 6–12)
POTASSIUM SERPL-SCNC: 4 MMOL/L (ref 3.5–5.2)
RBC # BLD AUTO: 4.03 10*6/MM3 (ref 4.14–5.8)
SODIUM SERPL-SCNC: 140 MMOL/L (ref 136–145)
WBC NRBC COR # BLD: 6.64 10*3/MM3 (ref 3.4–10.8)

## 2022-09-26 PROCEDURE — 93005 ELECTROCARDIOGRAM TRACING: CPT | Performed by: PODIATRIST

## 2022-09-26 PROCEDURE — 80048 BASIC METABOLIC PNL TOTAL CA: CPT

## 2022-09-26 PROCEDURE — 36415 COLL VENOUS BLD VENIPUNCTURE: CPT

## 2022-09-26 PROCEDURE — 93010 ELECTROCARDIOGRAM REPORT: CPT | Performed by: INTERNAL MEDICINE

## 2022-09-26 PROCEDURE — 71046 X-RAY EXAM CHEST 2 VIEWS: CPT

## 2022-09-26 PROCEDURE — 85027 COMPLETE CBC AUTOMATED: CPT

## 2022-09-26 NOTE — TELEPHONE ENCOUNTER
PATIENT CALLED IN [phone] TO MAKE SURE WE RECEIVED HIS [facility] PAPERWORK DUE TO COMPANY SAYING THEY HAVE NOT RECEIVED ANYTHING BACK. INFORMED PATIENT WE RECEIVED HIS PAPERWORK VIA FAX ON 09/22 AND 09/26/2022 PLEASE ALLOW 7-14 BUSINESS DAYS.

## 2022-09-29 LAB — QT INTERVAL: 411 MS

## 2022-10-03 ENCOUNTER — ANESTHESIA (OUTPATIENT)
Dept: PERIOP | Facility: HOSPITAL | Age: 32
End: 2022-10-03

## 2022-10-03 ENCOUNTER — HOSPITAL ENCOUNTER (OUTPATIENT)
Facility: HOSPITAL | Age: 32
Setting detail: HOSPITAL OUTPATIENT SURGERY
Discharge: HOME | End: 2022-10-03
Attending: PODIATRIST | Admitting: PODIATRIST

## 2022-10-03 ENCOUNTER — ANESTHESIA EVENT (OUTPATIENT)
Dept: PERIOP | Facility: HOSPITAL | Age: 32
End: 2022-10-03

## 2022-10-03 VITALS
DIASTOLIC BLOOD PRESSURE: 75 MMHG | BODY MASS INDEX: 40.43 KG/M2 | RESPIRATION RATE: 18 BRPM | TEMPERATURE: 96.1 F | WEIGHT: 315 LBS | HEIGHT: 74 IN | SYSTOLIC BLOOD PRESSURE: 116 MMHG | OXYGEN SATURATION: 94 % | HEART RATE: 77 BPM

## 2022-10-03 DIAGNOSIS — M72.2 PLANTAR FASCIITIS OF RIGHT FOOT: ICD-10-CM

## 2022-10-03 PROCEDURE — 25010000002 PROPOFOL 10 MG/ML EMULSION: Performed by: ANESTHESIOLOGY

## 2022-10-03 PROCEDURE — 25010000002 SUCCINYLCHOLINE PER 20 MG: Performed by: ANESTHESIOLOGY

## 2022-10-03 PROCEDURE — 29893 ENDOSCOPIC PLANTR FASCIOTOMY: CPT | Performed by: PODIATRIST

## 2022-10-03 RX ORDER — SODIUM CHLORIDE, SODIUM LACTATE, POTASSIUM CHLORIDE, CALCIUM CHLORIDE 600; 310; 30; 20 MG/100ML; MG/100ML; MG/100ML; MG/100ML
INJECTION, SOLUTION INTRAVENOUS CONTINUOUS PRN
Status: DISCONTINUED | OUTPATIENT
Start: 2022-10-03 | End: 2022-10-03 | Stop reason: SURG

## 2022-10-03 RX ORDER — MORPHINE SULFATE 4 MG/ML
2 INJECTION, SOLUTION INTRAMUSCULAR; INTRAVENOUS
Status: DISCONTINUED | OUTPATIENT
Start: 2022-10-03 | End: 2022-10-03 | Stop reason: HOSPADM

## 2022-10-03 RX ORDER — ONDANSETRON 2 MG/ML
4 INJECTION INTRAMUSCULAR; INTRAVENOUS ONCE
Status: DISCONTINUED | OUTPATIENT
Start: 2022-10-03 | End: 2022-10-03 | Stop reason: HOSPADM

## 2022-10-03 RX ORDER — ROCURONIUM BROMIDE 10 MG/ML
INJECTION, SOLUTION INTRAVENOUS AS NEEDED
Status: DISCONTINUED | OUTPATIENT
Start: 2022-10-03 | End: 2022-10-03 | Stop reason: SURG

## 2022-10-03 RX ORDER — GLYCOPYRROLATE 0.2 MG/ML
INJECTION INTRAMUSCULAR; INTRAVENOUS AS NEEDED
Status: DISCONTINUED | OUTPATIENT
Start: 2022-10-03 | End: 2022-10-03 | Stop reason: SURG

## 2022-10-03 RX ORDER — BUPIVACAINE HYDROCHLORIDE 5 MG/ML
INJECTION, SOLUTION PERINEURAL AS NEEDED
Status: DISCONTINUED | OUTPATIENT
Start: 2022-10-03 | End: 2022-10-03 | Stop reason: HOSPADM

## 2022-10-03 RX ORDER — SUCCINYLCHOLINE CHLORIDE 20 MG/ML
INJECTION INTRAMUSCULAR; INTRAVENOUS AS NEEDED
Status: DISCONTINUED | OUTPATIENT
Start: 2022-10-03 | End: 2022-10-03 | Stop reason: SURG

## 2022-10-03 RX ORDER — CEFAZOLIN SODIUM IN 0.9 % NACL 3 G/100 ML
3 INTRAVENOUS SOLUTION, PIGGYBACK (ML) INTRAVENOUS ONCE
Status: COMPLETED | OUTPATIENT
Start: 2022-10-03 | End: 2022-10-03

## 2022-10-03 RX ORDER — HYDROCODONE BITARTRATE AND ACETAMINOPHEN 7.5; 325 MG/1; MG/1
1 TABLET ORAL EVERY 6 HOURS PRN
Qty: 28 TABLET | Refills: 0 | Status: SHIPPED | OUTPATIENT
Start: 2022-10-03 | End: 2022-12-29

## 2022-10-03 RX ORDER — PROPOFOL 10 MG/ML
VIAL (ML) INTRAVENOUS AS NEEDED
Status: DISCONTINUED | OUTPATIENT
Start: 2022-10-03 | End: 2022-10-03 | Stop reason: SURG

## 2022-10-03 RX ADMIN — SUCCINYLCHOLINE CHLORIDE 200 MG: 20 INJECTION, SOLUTION INTRAMUSCULAR; INTRAVENOUS at 11:53

## 2022-10-03 RX ADMIN — SODIUM CHLORIDE, SODIUM LACTATE, POTASSIUM CHLORIDE, AND CALCIUM CHLORIDE: .6; .31; .03; .02 INJECTION, SOLUTION INTRAVENOUS at 11:49

## 2022-10-03 RX ADMIN — ROCURONIUM BROMIDE 10 MG: 10 INJECTION, SOLUTION INTRAVENOUS at 11:52

## 2022-10-03 RX ADMIN — SUGAMMADEX 200 MG: 100 INJECTION, SOLUTION INTRAVENOUS at 12:15

## 2022-10-03 RX ADMIN — PROPOFOL 300 MG: 10 INJECTION, EMULSION INTRAVENOUS at 11:53

## 2022-10-03 RX ADMIN — Medication 3 G: at 11:57

## 2022-10-03 RX ADMIN — GLYCOPYRROLATE 0.2 MG: 0.2 INJECTION INTRAMUSCULAR; INTRAVENOUS at 11:50

## 2022-10-03 NOTE — OP NOTE
Operative Note   Foot and Ankle Surgery   Provider: Dr. Savage Berman   Location: [facility]      Procedure:  1.  Endoscopic plantar fasciotomy, right    Pre-operative Diagnosis:   1. Plantar fasciitis, right    Post-operative Diagnosis: Same    Surgeon: Savage Berman    Assistant: None    Anesthesia: General    Implants: None    Findings: No unexpected findings    Specimen: None    Blood Loss: Less than 5cc    Complications: None    Post Op Plan: Discharge home.  Strict nonweightbearing activity.  Follow-up with me in 2 weeks    Summary:    Patient is a 32-year-old obese male that has been seen in clinic for chronic right foot pain.  Patient states that the symptoms have been ongoing for several months.  We have tried to treat this issue conservatively.  Patient has been treated for Planter fasciitis.  I explained that his obesity and increased activity is likely causing majority of his pain.  Patient understands and agrees.  We did discuss alternative options towards improvement including endoscopic plantar fasciotomy.  He understands that he will require nonweightbearing and decreased activity after surgery.    Procedure, risks, complications, and goals were discussed with the patient at bedside.  Risks include but are not limited to infection, complications from anesthesia (including death), chronic pain or numbness, hematoma/seroma, deep vein thrombosis, wound complications, and potential for additional surgical procedures.  Patient understands and elects to proceed with surgery at this time. Informed consent was obtained before proceeding to the operating suite.  All questions were answered to the patient's satisfaction. No guarantees or assurances were given or implied.  Procedure:    Patient was brought to the operating room and placed on the operative table in a supine position.  Once adequate general anesthesia was administered, a pneumatic tourniquet was placed about the patient's operative  ankle.  The foot was scrubbed prepped and draped in the usual sterile fashion.  The limb was elevated and exsanguinated and the pneumatic tourniquet was inflated to 250mmHg.  A formal timeout was conducted prior skin incision.    Attention was then directed to the heel.  A small stab incision was performed to the medial plantar aspect of the heel close to the level of the plantar fascia origin.  Blunt dissection was performed to the level of the ligament.  The plantar fascial ligament was identified and undermined with a Haywood.  The trocar and cannula were then introduced into the medial portal and a lateral exit portal was obtained.  All stab incision was performed allowing for exit of the trocar.  The trocar was removed and the 2.7 mm arthroscope was introduced into the lateral portal.  The plantar fascial ligament was directly visualized and the foot with the hallux was placed into dorsiflexion allowing for maximal tension on the plantar fascial ligament.  Approximately 50% of the ligament was transected utilizing a triangle blade.  The resection was checked multiple times with a blunt probe to ensure adequate release.  The muscle belly of the flexor digitorum longus was identified.  The wound was irrigated with copious amounts of normal saline.  All instrumentation was removed.  A postoperative block was performed utilizing 20 cc of half percent Marcaine plain.  The incisions were closed with a 3-0 nylon.  The wounds were dressed with Xeroform and sterile compressive dressings.  The tourniquet was released and prompt hyperemic response was noted to all digits of the operative foot. Patient tolerated the procedure and anesthesia well. She was transferred from the operating room to the recovery room with vital signs stable and neurovascular status unchanged to the operative extremity.        Dr. Savage Berman, LANI  [facility]  [phone]    Note is dictated utilizing voice recognition software.  Unfortunately this leads to occasional typographical errors. I apologize in advance if the situation occurs. If questions occur please do not hesitate to call our office.

## 2022-10-03 NOTE — ANESTHESIA PROCEDURE NOTES
Airway  Urgency: elective    Date/Time: 10/3/2022 11:55 AM  End Time:10/3/2022 11:55 AM  Airway not difficult    General Information and Staff    Patient location during procedure: OR  Anesthesiologist: Jared Oswald MD    Indications and Patient Condition  Indications for airway management: airway protection    Preoxygenated: yes  MILS maintained throughout  Mask difficulty assessment: 1 - vent by mask    Final Airway Details  Final airway type: endotracheal airway      Successful airway: ETT  Cuffed: yes   Successful intubation technique: video laryngoscopy  Endotracheal tube insertion site: oral  Blade: Marcelo  Blade size: 4  ETT size (mm): 7.0  Cormack-Lehane Classification: grade I - full view of glottis  Placement verified by: chest auscultation and capnometry   Measured from: teeth  ETT/EBT  to teeth (cm): 24  Number of attempts at approach: 1  Assessment: lips, teeth, and gum same as pre-op and atraumatic intubation    Additional Comments  ATRAUMATIC X1 USING NEGRON WITH EASE. EASY MASK W ORAL AIRWAY.  TEETH IN PREOP CONDITION.  CUFF TO MINIMUM OCCLUSIVE CUFF PRESSURE. POS ETC02. BS=BS. GAUZE BITE BLOCK

## 2022-10-03 NOTE — ANESTHESIA POSTPROCEDURE EVALUATION
Patient: Johnny Duran    Procedure Summary     Date: 10/03/22 Room / Location: [facility] OR 08 / [facility] MAIN OR    Anesthesia Start: 1147 Anesthesia Stop: 1228    Procedure: ENDOSCOPIC PLANTAR FASCIOTOMY (Right Foot) Diagnosis:       Plantar fasciitis of right foot      (Plantar fasciitis of right foot [M72.2])    Surgeons: CASEY Berman DPM Provider: Elvis Dumont MD    Anesthesia Type: general ASA Status: 3          Anesthesia Type: general    Vitals  Vitals Value Taken Time   /68 10/03/22 1317   Temp 97 °F (36.1 °C) 10/03/22 1315   Pulse 83 10/03/22 1318   Resp 15 10/03/22 1315   SpO2 94 % 10/03/22 1318   Vitals shown include unvalidated device data.        Post Anesthesia Care and Evaluation    Patient location during evaluation: PACU  Patient participation: complete - patient participated  Level of consciousness: awake  Pain score: 0  Pain management: adequate    Airway patency: patent  Anesthetic complications: No anesthetic complications  PONV Status: none  Cardiovascular status: acceptable  Respiratory status: acceptable  Hydration status: acceptable    Comments: Patient seen and examined postoperatively; vital signs stable; SpO2 greater than or equal to 90%; cardiopulmonary status stable; nausea/vomiting adequately controlled; pain adequately controlled; no apparent anesthesia complications; patient discharged from anesthesia care when discharge criteria were met

## 2022-10-17 ENCOUNTER — OFFICE VISIT (OUTPATIENT)
Dept: PODIATRY | Facility: CLINIC | Age: 32
End: 2022-10-17

## 2022-10-17 VITALS — HEART RATE: 86 BPM | OXYGEN SATURATION: 97 % | WEIGHT: 315 LBS | HEIGHT: 74 IN | BODY MASS INDEX: 40.43 KG/M2

## 2022-10-17 DIAGNOSIS — E66.01 MORBID OBESITY WITH BMI OF 60.0-69.9, ADULT: ICD-10-CM

## 2022-10-17 DIAGNOSIS — M79.671 RIGHT FOOT PAIN: Primary | ICD-10-CM

## 2022-10-17 DIAGNOSIS — M72.2 PLANTAR FASCIITIS OF RIGHT FOOT: ICD-10-CM

## 2022-10-17 PROCEDURE — 99024 POSTOP FOLLOW-UP VISIT: CPT | Performed by: PODIATRIST

## 2022-10-17 NOTE — PROGRESS NOTES
"10/17/2022  Foot and Ankle Surgery - Established Patient/Follow-up  Provider: Dr. Savage Berman DPM  Location: [facility]    Subjective:  Johnny Duran is a 32 y.o. male.     Chief Complaint   Patient presents with   • Right Foot - Plantar Fasciitis   • Post-op     A Vazquez 03/21/2022       HPI:    Johnny Duran is 32 years old male who returns 2 weeks after right sided EPF.    The patient reports that he is doing well. He states that he has been able to stay off  the foot for the most part. He reports that he has to walk on his tippy toes and  he is still off work.      Allergies   Allergen Reactions   • Codeine Hives   • Bee Venom Swelling       Current Outpatient Medications on File Prior to Visit   Medication Sig Dispense Refill   • HYDROcodone-acetaminophen (NORCO) 7.5-325 MG per tablet Take 1 tablet by mouth Every 6 (Six) Hours As Needed for Moderate Pain (Pain). 28 tablet 0     No current facility-administered medications on file prior to visit.       Objective   Pulse 86   Ht 188 cm (74\")   Wt (!) 225 kg (497 lb)   SpO2 97%   BMI 63.81 kg/m²     Foot/Ankle Exam:       General:   Appearance: appears stated age and healthy and obesity    Orientation: AAOx3    Affect: appropriate    Gait: antalgic      VASCULAR      Right Foot Vascularity   Normal vascular exam    Dorsalis pedis:  2+  Posterior tibial:  2+  Skin Temperature: warm    Edema Grading:  None  CFT:  < 3 seconds  Pedal Hair Growth:  Present  Varicosities: none        NEUROLOGIC     Right Foot Neurologic   Light touch sensation:  Normal  Hot/Cold sensation: normal    Achilles reflex:  2+     MUSCULOSKELETAL      Right Foot Musculoskeletal   Ecchymosis:  None  Tenderness: plantar fascia and plantar heel    Arch:  Normal     MUSCLE STRENGTH     Right Foot Muscle Strength   Normal strength    Foot dorsiflexion:  5  Foot plantar flexion:  5  Foot inversion:  5  Foot eversion:  5     DERMATOLOGIC     Right Foot Dermatologic   Skin: Incision " site is dry and stable with intact sutures.  No evidence of dehiscence or infection    Assessment & Plan   Diagnoses and all orders for this visit:    1. Right foot pain (Primary)    2. Plantar fasciitis of right foot    3. Morbid obesity with BMI of 60.0-69.9, adult (HCC)      Patient appears to be doing quite well 2 weeks after surgery.  Sutures were removed today without complication.  I have asked that he return to partial weightbearing activities in the cam boot as tolerated.  He is to continue to avoid high-impact and excessive activity.  I would like him to return to stretching manual therapy exercises.  Patient will remain off work until follow-up with me in 4 weeks.  He is to call with any additional issues or concerns.    No orders of the defined types were placed in this encounter.         Note is dictated utilizing voice recognition software. Unfortunately this leads to occasional typographical errors. I apologize in advance if the situation occurs. If questions occur please do not hesitate to call our office.

## 2022-10-18 ENCOUNTER — TELEPHONE (OUTPATIENT)
Dept: BARIATRICS/WEIGHT MGMT | Facility: CLINIC | Age: 32
End: 2022-10-18

## 2022-10-18 NOTE — TELEPHONE ENCOUNTER
Called Johnny regarding bariatric packet. We let him know that the R plan he has currently does not cover bariatric surgery. PT NATHAN.

## 2022-12-29 ENCOUNTER — OFFICE VISIT (OUTPATIENT)
Dept: PODIATRY | Facility: CLINIC | Age: 32
End: 2022-12-29

## 2022-12-29 VITALS — WEIGHT: 315 LBS | HEIGHT: 74 IN | RESPIRATION RATE: 20 BRPM | BODY MASS INDEX: 40.43 KG/M2

## 2022-12-29 DIAGNOSIS — M79.671 RIGHT FOOT PAIN: Primary | ICD-10-CM

## 2022-12-29 DIAGNOSIS — M72.2 PLANTAR FASCIITIS OF RIGHT FOOT: ICD-10-CM

## 2022-12-29 DIAGNOSIS — E66.01 MORBID OBESITY WITH BMI OF 60.0-69.9, ADULT: ICD-10-CM

## 2022-12-29 PROCEDURE — 20550 NJX 1 TENDON SHEATH/LIGAMENT: CPT | Performed by: PODIATRIST

## 2022-12-29 PROCEDURE — 99024 POSTOP FOLLOW-UP VISIT: CPT | Performed by: PODIATRIST

## 2022-12-29 RX ORDER — TRIAMCINOLONE ACETONIDE 40 MG/ML
20 INJECTION, SUSPENSION INTRA-ARTICULAR; INTRAMUSCULAR ONCE
Status: COMPLETED | OUTPATIENT
Start: 2022-12-29 | End: 2022-12-29

## 2022-12-29 RX ADMIN — TRIAMCINOLONE ACETONIDE 20 MG: 40 INJECTION, SUSPENSION INTRA-ARTICULAR; INTRAMUSCULAR at 14:15

## 2022-12-29 NOTE — PROGRESS NOTES
"12/29/2022  Foot and Ankle Surgery - Established Patient/Follow-up  Provider: Dr. Savage Berman, MOSHE  Location: [facility]    Subjective:  Johnny Duran is a 32 y.o. male.     Chief Complaint   Patient presents with   • Right Foot - Pain, Post-op Follow-up     10/3/2022    Endoscopic plantar fasciotomy, right   • Post-op     CARLOS Vazquez  11/2022  date unknown       HPI:   The patient returns for follow-up regarding his right foot. He was last seen 2 weeks after his surgery in early 10/2022. He returned to work on 12/26/2022 and began to experience pain. He states his pain is not as bad as it was previously, but he has been sitting down more and has not been performing the full part of his job. He states that if he puts a lot of pressure on his foot, it is more painful. The patient states that he is still performing exercises 4 to 6 times per day. He denies doing any formal physical therapy. He notes he has been doing some more ambulation than he was previously. He states that his insurance will not cover bariatric surgery.      Allergies   Allergen Reactions   • Codeine Hives   • Bee Venom Swelling       No current outpatient medications on file prior to visit.     No current facility-administered medications on file prior to visit.       Objective   Resp 20   Ht 188 cm (74\")   Wt (!) 225 kg (497 lb)   BMI 63.81 kg/m²     Foot/Ankle Exam:       General:   Appearance: appears stated age and healthy and obesity    Orientation: AAOx3    Affect: appropriate    Gait: antalgic      VASCULAR      Right Foot Vascularity   Normal vascular exam    Dorsalis pedis:  2+  Posterior tibial:  2+  Skin Temperature: warm    Edema Grading:  None  CFT:  < 3 seconds  Pedal Hair Growth:  Present  Varicosities: none        NEUROLOGIC     Right Foot Neurologic   Light touch sensation:  Normal  Hot/Cold sensation: normal    Achilles reflex:  2+     MUSCULOSKELETAL      Right Foot Musculoskeletal   Ecchymosis:  " None  Tenderness: plantar fascia and plantar heel    Arch:  Normal     MUSCLE STRENGTH     Right Foot Muscle Strength   Normal strength    Foot dorsiflexion:  5  Foot plantar flexion:  5  Foot inversion:  5  Foot eversion:  5     DERMATOLOGIC     Right Foot Dermatologic   Skin: skin intact        Right Foot Additional Comments Discomfort with palpation involving the plantar medial calcaneal tuberosity region. No progressive deformity or instability.      Assessment & Plan   Diagnoses and all orders for this visit:    1. Right foot pain (Primary)    2. Plantar fasciitis of right foot  -     Injection Tendon or Ligament  -     Ambulatory Referral to Physical Therapy  -     triamcinolone acetonide (KENALOG-40) injection 20 mg    3. Morbid obesity with BMI of 60.0-69.9, adult (HCC)         The patient presents to the clinic for follow-up regarding his right foot. He understands the surgery is to get rid of all the chronic scar tissue, but it develops new scar tissue and new scar tissue is very subject to inflammation. To neutralize that, it is going to be a time-based process with conservative care. Formal physical therapy can help adjust this to some extent where they can massage and work the area; however, everything he is doing at home is very important. He agrees to a steroid injection to calm down some of the discomfort. He is to continue lower impact exercises and understands weight is going to be the biggest issue.  I have discussed the importance of weight loss with the patient at length.  Patient states that he has discussed these issues with bariatric surgeon.  I have asked that he continue to monitor and return in 4 weeks for reevaluation.    Plantar Fascia Steroid Injection: Right    Consent and time out was performed before proceeding with the procedure.  The area of maximal tenderness was palpated near the plantar medial calcaneal tuberosity of right foot.  The area was cleansed with alcohol.  Under  ultrasound guidance, the plantar fascia was visualized and a solution totaling 1.5 mL was injected about the origin of the plantar fascia.  The solution contained 0.5 mL of 1% lidocaine plain, 0.5 mL of 0.5% Marcaine plain, and 0.5 mL of Kenalog.  After the injection, the patient noted immediate pain relief.  Mild compression was placed at the injection site followed by a sterile bandage.  The patient tolerated the injection well without complication.        Orders Placed This Encounter   Procedures   • Injection Tendon or Ligament     Order Specific Question:   Release to patient     Answer:   Routine Release   • Ambulatory Referral to Physical Therapy     Referral Priority:   Routine     Referral Type:   Physical Therapy     Referral Reason:   Specialty Services Required     Requested Specialty:   Physical Therapy     Number of Visits Requested:   1          Note is dictated utilizing voice recognition software. Unfortunately this leads to occasional typographical errors. I apologize in advance if the situation occurs. If questions occur please do not hesitate to call our office.    Transcribed from ambient dictation for CASEY Berman DPM by Uzair Pablo.  12/29/22   13:50 EST    Patient or patient representative verbalized consent to the visit recording.  I have personally performed the services described in this document as transcribed by the above individual, and it is both accurate and complete.

## 2023-01-10 ENCOUNTER — TREATMENT (OUTPATIENT)
Dept: PHYSICAL THERAPY | Facility: CLINIC | Age: 33
End: 2023-01-10
Payer: COMMERCIAL

## 2023-01-10 DIAGNOSIS — M72.2 PLANTAR FASCIITIS OF RIGHT FOOT: Primary | ICD-10-CM

## 2023-01-10 DIAGNOSIS — R26.2 DIFFICULTY WALKING: ICD-10-CM

## 2023-01-10 DIAGNOSIS — M79.671 RIGHT FOOT PAIN: ICD-10-CM

## 2023-01-10 PROCEDURE — 97161 PT EVAL LOW COMPLEX 20 MIN: CPT | Performed by: PHYSICAL THERAPIST

## 2023-01-10 PROCEDURE — 97035 APP MDLTY 1+ULTRASOUND EA 15: CPT | Performed by: PHYSICAL THERAPIST

## 2023-01-10 PROCEDURE — 97140 MANUAL THERAPY 1/> REGIONS: CPT | Performed by: PHYSICAL THERAPIST

## 2023-01-10 NOTE — PROGRESS NOTES
Physical Therapy Initial Evaluation and Plan of Care     [address] [place] [address] [place] [address]    Patient: Johnny Duran   : 1990  Diagnosis/ICD-10 Code:  Plantar fasciitis of right foot [M72.2]  Referring practitioner: CASEY Berman DPM  Date of Initial Visit: 1/10/2023  Today's Date: 1/10/2023  Patient seen for 1 sessions           Subjective Questionnaire: PT Functional Test: FAAM = 49/84, indicating 58% ability and 42% impairment      Subjective Evaluation    History of Present Illness  Mechanism of injury: Pt reports having R foot pain. Had plantar fascia release on 10/3/2022. Returned to work 2 wk ago. States R foot was feeling pretty good before returning to work and was on his feet just a couple hours a day. Upon return to work, pt works 8 hr and on concrete floor. Pt is able to sit down when he needs to and states he sits down most of the day. Having pain with lifting. Having mild pain with walking in his home. Has constant ache and increases with activity. Difficulty moving his foot at times due to pain. Rolls foot on frozen bottle. First few steps are painful when he gets up from sitting. Difficulty with stairs. Difficulty walking on uneven ground. Helps with activities and tasks at [employer] and states he pushes himself through the pain but then pays for it for 2-3 days. Pt states he has been wearing inserts in shoes consistently. Got good pair of shoes from [employer] but they wore out and got another pair online that was a little cheaper.  Had steroid injection at MD appt on 2022 and states it has helped some.       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 Quality of life: good    Pain  Current pain ratin  At best pain rating: 3  At worst pain ratin  Location: bottom of R foot at heel  Quality: dull ache, knife-like, discomfort, cramping and sharp  Relieving factors: rest, change in position and ice  Aggravating factors: lifting, movement, stairs,  ambulation, prolonged positioning, repetitive movement and squatting  Progression: no change    Social Support  Lives in: one-story house  Lives with: alone    Patient Goals  Patient goals for therapy: decreased pain, improved balance, increased motion and increased strength             Objective          Observations     Additional Ankle/Foot Observation Details  Inserts in shoes B. Pt has a pair of [employer] tennis shoes, does not appear to have added stability. Lateral aspect of B shoes broken down.    Gait: Increased inversion during stance phase B with increased wt bearing on lateral aspect of foot, mild decrease in R stance time.    Resting position of feet in inversion, R>L.    Tenderness     Right Ankle/Foot   Tenderness in the mid-plantar aspect and plantar fascia.     Active Range of Motion   Left Knee   Normal active range of motion    Right Knee   Normal active range of motion  Left Ankle/Foot   Dorsiflexion (ke): 16 degrees   Plantar flexion: 52 degrees   Inversion: 38 degrees   Eversion: 26 degrees     Right Ankle/Foot   Dorsiflexion (ke): 4 degrees   Plantar flexion: 44 degrees   Inversion: 30 degrees   Eversion: 22 degrees     Strength/Myotome Testing     Left Knee   Normal strength    Right Knee   Normal strength    Left Ankle/Foot   Dorsiflexion: 5  Plantar flexion: 4+  Inversion: 5  Eversion: 5    Right Ankle/Foot   Dorsiflexion: 4+  Plantar flexion: 4-  Inversion: 4+  Eversion: 4          Assessment & Plan     Assessment  Impairments: abnormal gait, abnormal or restricted ROM, activity intolerance, impaired balance, impaired physical strength, lacks appropriate home exercise program, pain with function and weight-bearing intolerance  Functional Limitations: lifting, sleeping, walking, uncomfortable because of pain, sitting, standing, stooping and unable to perform repetitive tasks  Assessment details: Pt is 33 yo male with dx of R plantar fasciitis. Pt underwent R Endoscopic plantar fasciotomy  10/2022 and was doing well. Upon return to work, pt has developed worsening of R foot pain. Pt presents with decreased strength and ROM of R ankle with tightness of R gastroc. Pt is having increased pain with standing and walking. Initial gait is most painful. Difficulty with stairs. Difficulty walking on uneven surfaces. Has to sit mostly at work due to foot pain. Pt is compliant with daily use of shoe inserts for added support. FAAM indicates 42% impairment.    Patient presents with the impairments listed above and based on the objective findings and the physical therapy evaluation, the patient's condition has the potential to improve in response to therapy.   The patient's condition and/or services required are at a level of complexity that necessitates the skill & supervision of a physical therapist.    Prognosis: good    Goals  Plan Goals: STG to be met in 3 wk:  - Increase R ankle DF AROM to 8 deg.  - PT to assess SLS in 3-4 visits.  - Pt to be independent with HEP in 3 weeks.  LTG to be met in 12 wk:  - Improve FAAM score to 20% or better ability.  - Increase R ankle INV and EV strength to 4+/5 in all directions in order to walk on uneven surfaces safely.  - Increase R ankle DF AROM to 10 deg for improved ease with stepping down curbs in the community.  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    Plan  Therapy options: will be seen for skilled therapy services  Planned modality interventions: thermotherapy (hydrocollator packs), cryotherapy and ultrasound  Other planned modality interventions: modalities as indicated  Planned therapy interventions: balance/weight-bearing training, manual therapy, flexibility, functional ROM exercises, gait training, home exercise program, joint mobilization, neuromuscular re-education, soft tissue mobilization, strengthening,  stretching, therapeutic activities, postural training, transfer training and body mechanics training  Frequency: 2x week  Duration in weeks: 12  Treatment plan discussed with: patient  Plan details: Talked with pt that his current tennis shoes are broken down on lateral aspects and recommended pt purchase new pair for improved foot alignment and decreased pain. Pt verbalized understanding.        Timed:         Manual Therapy:    15     mins  43300;     Therapeutic Exercise:    5     mins  53570;     Neuromuscular Veronica:        mins  98022;    Therapeutic Activity:          mins  26388;     Gait Training:           mins  76074;     Ultrasound:     10     mins  59557;    Ionto                                   mins   41979  Self - Care                          mins  13780    Un-Timed:  Electrical Stimulation:         mins  49158 ( );  Dry Needling          20561/04328  Traction          mins 33036  Can Repos          mins 04212  Low Eval     20     Mins  82186  Mod Eval          Mins  39081  High Eval                            Mins  15864      Timed Treatment:   25   mins   Total Treatment:     45   mins      PT SIGNATURE: Verito Irizarry PT, CLT  [place] Lic # [id number]  Electronically signed by Verito Irizarry PT, 01/10/23, 11:40 AM EST    Initial Certification  Certification Period: 1/10/2023 thru 4/9/2023  I certify that the therapy services are furnished while this patient is under my care.  The services outlined above are required by this patient, and will be reviewed every 90 days.     PHYSICIAN: CASEY Berman DPM _____________________________________________________  NPI: [id number]                                      DATE:    Please sign and return via fax to [phone]. Thank you, [facility].

## 2023-01-24 ENCOUNTER — TREATMENT (OUTPATIENT)
Dept: PHYSICAL THERAPY | Facility: CLINIC | Age: 33
End: 2023-01-24
Payer: COMMERCIAL

## 2023-01-24 DIAGNOSIS — M72.2 PLANTAR FASCIITIS OF RIGHT FOOT: Primary | ICD-10-CM

## 2023-01-24 DIAGNOSIS — M79.671 RIGHT FOOT PAIN: ICD-10-CM

## 2023-01-24 DIAGNOSIS — R26.2 DIFFICULTY WALKING: ICD-10-CM

## 2023-01-24 PROCEDURE — 97110 THERAPEUTIC EXERCISES: CPT | Performed by: PHYSICAL THERAPIST

## 2023-01-24 PROCEDURE — 97140 MANUAL THERAPY 1/> REGIONS: CPT | Performed by: PHYSICAL THERAPIST

## 2023-01-24 PROCEDURE — 97035 APP MDLTY 1+ULTRASOUND EA 15: CPT | Performed by: PHYSICAL THERAPIST

## 2023-01-24 NOTE — PROGRESS NOTES
Physical Therapy Daily Treatment Note      Patient: Johnny Duran   : 1990  Diagnosis/ICD-10 Code:  Plantar fasciitis of right foot [M72.2]   Problems Addressed this Visit        Musculoskeletal and Injuries    Plantar fasciitis of right foot - Primary   Other Visit Diagnoses     Right foot pain        Difficulty walking          Diagnoses       Codes Comments    Plantar fasciitis of right foot    -  Primary ICD-10-CM: M72.2  ICD-9-CM: 728.71     Right foot pain     ICD-10-CM: M79.671  ICD-9-CM: 729.5     Difficulty walking     ICD-10-CM: R26.2  ICD-9-CM: 719.7          Referring practitioner: CASEY Berman DPM  Date of Initial Visit: Type: THERAPY  Noted: 1/10/2023  Today's Date: 2023    VISIT#: 2    Subjective Patient reports overall feeling much better since having shot and performing HEP from initial visit. Pleased with progress. Has follow up with Dr. Berman this Thursday.       Objective added leg press, baps board, Nustep, slantboard     See Exercise, Manual, and Modality Logs for complete treatment.     Assessment/Plan Patient demonstrated good tolerance to therapeutic exercise/activity well with no reports of increased symptoms.  Patient responding well to current HEP and treatment with decreased pain reported and improved tolerance to work without increased R Heel pain.   Goals  Plan Goals: STG to be met in 3 wk:  - Increase R ankle DF AROM to 8 deg.  - PT to assess SLS in 3-4 visits.  - Pt to be independent with HEP in 3 weeks.  LTG to be met in 12 wk:  - Improve FAAM score to 20% or better ability.  - Increase R ankle INV and EV strength to 4+/5 in all directions in order to walk on uneven surfaces safely.  - Increase R ankle DF AROM to 10 deg for improved ease with stepping down curbs in the community.  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    Progress per Plan of Care and Progress strengthening /stabilization /functional activity         Timed:         Manual Therapy:    15     mins  97916;     Therapeutic Exercise:    15     mins  49289;     Neuromuscular Veronica:        mins  65888;    Therapeutic Activity:     5     mins  95462;     Gait Training:           mins  50254;     Ultrasound:     10     mins  66644;    Ionto                                   mins   04565  Self Care                    _____  mins   48571  Canalith Repos                   mins  65230    Un-Timed:  Electrical Stimulation:         mins  10811 ( );  Dry Needling          mins self-pay   Traction          mins 52965    Timed Treatment:   45   mins   Total Treatment:     45   mins    Willard Quarles PTA  [place] License [id number]  Physical Therapist Assistant

## 2023-01-26 ENCOUNTER — OFFICE VISIT (OUTPATIENT)
Dept: PODIATRY | Facility: CLINIC | Age: 33
End: 2023-01-26
Payer: COMMERCIAL

## 2023-01-26 ENCOUNTER — TREATMENT (OUTPATIENT)
Dept: PHYSICAL THERAPY | Facility: CLINIC | Age: 33
End: 2023-01-26
Payer: COMMERCIAL

## 2023-01-26 VITALS — HEART RATE: 88 BPM | OXYGEN SATURATION: 97 % | WEIGHT: 315 LBS | HEIGHT: 74 IN | BODY MASS INDEX: 40.43 KG/M2

## 2023-01-26 DIAGNOSIS — R26.2 DIFFICULTY WALKING: ICD-10-CM

## 2023-01-26 DIAGNOSIS — M72.2 PLANTAR FASCIITIS OF RIGHT FOOT: ICD-10-CM

## 2023-01-26 DIAGNOSIS — E66.01 MORBID OBESITY WITH BMI OF 60.0-69.9, ADULT: ICD-10-CM

## 2023-01-26 DIAGNOSIS — M72.2 PLANTAR FASCIITIS OF RIGHT FOOT: Primary | ICD-10-CM

## 2023-01-26 DIAGNOSIS — M79.671 RIGHT FOOT PAIN: ICD-10-CM

## 2023-01-26 DIAGNOSIS — M79.671 RIGHT FOOT PAIN: Primary | ICD-10-CM

## 2023-01-26 PROCEDURE — 99213 OFFICE O/P EST LOW 20 MIN: CPT | Performed by: PODIATRIST

## 2023-01-26 PROCEDURE — 97035 APP MDLTY 1+ULTRASOUND EA 15: CPT | Performed by: PHYSICAL THERAPIST

## 2023-01-26 PROCEDURE — 97110 THERAPEUTIC EXERCISES: CPT | Performed by: PHYSICAL THERAPIST

## 2023-01-26 PROCEDURE — 97140 MANUAL THERAPY 1/> REGIONS: CPT | Performed by: PHYSICAL THERAPIST

## 2023-01-26 NOTE — PROGRESS NOTES
"01/26/2023  Foot and Ankle Surgery - Established Patient/Follow-up  Provider: Dr. Savage Berman DPM  Location: [facility]    Subjective:  Johnny Duran is a 32 y.o. male.     Chief Complaint   Patient presents with   • Right Foot - Follow-up, Pain   • Follow-up     JATIN Vazquez MD last seen 11/2022 date unknown       HPI:   The patient is a 32-year-old male who presents to the clinic for a follow-up regarding his right foot.    He reports he is feeling better. He adds he has returned to work about 4 weeks ago. He states this is his 3rd physical therapy appointment today. He notes he is performing home exercises as well.    Allergies   Allergen Reactions   • Codeine Hives   • Bee Venom Swelling       No current outpatient medications on file prior to visit.     No current facility-administered medications on file prior to visit.       Objective   Pulse 88   Ht 188 cm (74\")   Wt (!) 225 kg (497 lb)   SpO2 97%   BMI 63.81 kg/m²     Foot/Ankle Exam:       General:   Appearance: appears stated age and healthy and obesity    Orientation: AAOx3    Affect: appropriate    Gait: antalgic      VASCULAR      Right Foot Vascularity   Normal vascular exam    Dorsalis pedis:  2+  Posterior tibial:  2+  Skin Temperature: warm    Edema Grading:  None  CFT:  < 3 seconds  Pedal Hair Growth:  Present  Varicosities: none        NEUROLOGIC     Right Foot Neurologic   Light touch sensation:  Normal  Hot/Cold sensation: normal    Achilles reflex:  2+     MUSCULOSKELETAL      Right Foot Musculoskeletal   Ecchymosis:  None  Tenderness: plantar fascia and plantar heel    Arch:  Normal     MUSCLE STRENGTH     Right Foot Muscle Strength   Normal strength    Foot dorsiflexion:  5  Foot plantar flexion:  5  Foot inversion:  5  Foot eversion:  5     DERMATOLOGIC     Right Foot Dermatologic   Skin: skin intact        Right Foot Additional Comments Discomfort with palpation involving the plantar medial calcaneal tuberosity region. No " progressive deformity or instability.    01/26/2023  Substantially less pain, with palpation involving the heel.      Assessment & Plan   Diagnoses and all orders for this visit:    1. Right foot pain (Primary)    2. Plantar fasciitis of right foot    3. Morbid obesity with BMI of 60.0-69.9, adult (HCC)        The patient returns for follow-up regarding his right foot. Discussed with the patient, the injection appears to be working and we are in a good spot. Informed the patient he is to proceed with physical therapy and continue to wear proper shoes. Discussed with the patient on working towards weight loss. The patient is to return to the office in 2 months for reevaluation. Greater than 20 minutes was spent before, during, and after evaluation for patient care.    No orders of the defined types were placed in this encounter.         Note is dictated utilizing voice recognition software. Unfortunately this leads to occasional typographical errors. I apologize in advance if the situation occurs. If questions occur please do not hesitate to call our office.    Transcribed from ambient dictation for CASEY Berman DPM by Zaria Mcmahon.  01/26/23   15:28 EST    Patient or patient representative verbalized consent to the visit recording.  I have personally performed the services described in this document as transcribed by the above individual, and it is both accurate and complete.

## 2023-01-26 NOTE — PROGRESS NOTES
Physical Therapy Daily Treatment Note      Patient: Johnny Duran   : 1990  Diagnosis/ICD-10 Code:  Plantar fasciitis of right foot [M72.2]   Problems Addressed this Visit        Musculoskeletal and Injuries    Plantar fasciitis of right foot - Primary   Other Visit Diagnoses     Right foot pain        Difficulty walking          Diagnoses       Codes Comments    Plantar fasciitis of right foot    -  Primary ICD-10-CM: M72.2  ICD-9-CM: 728.71     Right foot pain     ICD-10-CM: M79.671  ICD-9-CM: 729.5     Difficulty walking     ICD-10-CM: R26.2  ICD-9-CM: 719.7          Referring practitioner: CASEY Berman DPM  Date of Initial Visit: Type: THERAPY  Noted: 1/10/2023  Today's Date: 2023    VISIT#: 3    Subjective Patient reports having mild soreness in R heel following progressions last time with symptoms resolving within a couple hours. Patient overall pleased with progress and has follow up with Dr. Berman later today.       Objective     See Exercise, Manual, and Modality Logs for complete treatment.     Assessment/Plan Patient tolerated progressed therapeutic exercise well with no with reports of increased symptoms. Patient with continued heel cord/gastroc tightness and will continue to benefit from stretching and progress active PF/DF mobility.   Goals  Plan Goals: STG to be met in 3 wk:  - Increase R ankle DF AROM to 8 deg.  - PT to assess SLS in 3-4 visits.  - Pt to be independent with HEP in 3 weeks.  LTG to be met in 12 wk:  - Improve FAAM score to 20% or better ability.  - Increase R ankle INV and EV strength to 4+/5 in all directions in order to walk on uneven surfaces safely.  - Increase R ankle DF AROM to 10 deg for improved ease with stepping down curbs in the community.  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    Progress per Plan of Care  and Progress strengthening /stabilization /functional activity         Timed:         Manual Therapy:    15     mins  47447;     Therapeutic Exercise:    15     mins  88635;     Neuromuscular Veronica:        mins  18165;    Therapeutic Activity:     3     mins  31959;     Gait Training:           mins  95245;     Ultrasound:     10     mins  38655;    Ionto                                   mins   00115  Self Care                    _____  mins   51160  Canalith Repos                   mins  71636    Un-Timed:  Electrical Stimulation:         mins  71369 ( );  Dry Needling          mins self-pay   Traction          mins 82567    Timed Treatment:   43   mins   Total Treatment:     43   mins    Willard Quarles PTA  [place] License [id number]  Physical Therapist Assistant

## 2023-02-06 ENCOUNTER — TREATMENT (OUTPATIENT)
Dept: PHYSICAL THERAPY | Facility: CLINIC | Age: 33
End: 2023-02-06
Payer: COMMERCIAL

## 2023-02-06 DIAGNOSIS — M79.671 RIGHT FOOT PAIN: ICD-10-CM

## 2023-02-06 DIAGNOSIS — R26.2 DIFFICULTY WALKING: ICD-10-CM

## 2023-02-06 DIAGNOSIS — M72.2 PLANTAR FASCIITIS OF RIGHT FOOT: Primary | ICD-10-CM

## 2023-02-06 PROCEDURE — 97110 THERAPEUTIC EXERCISES: CPT | Performed by: PHYSICAL THERAPIST

## 2023-02-06 PROCEDURE — 97140 MANUAL THERAPY 1/> REGIONS: CPT | Performed by: PHYSICAL THERAPIST

## 2023-02-06 PROCEDURE — 97035 APP MDLTY 1+ULTRASOUND EA 15: CPT | Performed by: PHYSICAL THERAPIST

## 2023-02-06 NOTE — PROGRESS NOTES
Physical Therapy Daily Treatment Note      Patient: Johnny Duran   : 1990  Diagnosis/ICD-10 Code:  Plantar fasciitis of right foot [M72.2]   Problems Addressed this Visit        Musculoskeletal and Injuries    Plantar fasciitis of right foot - Primary   Other Visit Diagnoses     Right foot pain        Difficulty walking          Diagnoses       Codes Comments    Plantar fasciitis of right foot    -  Primary ICD-10-CM: M72.2  ICD-9-CM: 728.71     Right foot pain     ICD-10-CM: M79.671  ICD-9-CM: 729.5     Difficulty walking     ICD-10-CM: R26.2  ICD-9-CM: 719.7          Referring practitioner: CASEY Berman DPM  Date of Initial Visit: Type: THERAPY  Noted: 1/10/2023  Today's Date: 2023    VISIT#: 4    Subjective Patient reports having a couple days mild pins and needles in R foot. Patient reports feeling better this morning and has continued to perform HEP active stretch.       Objective     See Exercise, Manual, and Modality Logs for complete treatment.     Assessment/Plan Patient continues to respond well to manual/active stretch and US with improved activity tolerance. Patient continues to make gradual gains towards goals at this time.   Goals  Plan Goals: STG to be met in 3 wk:  - Increase R ankle DF AROM to 8 deg.  - PT to assess SLS in 3-4 visits.  - Pt to be independent with HEP in 3 weeks.  LTG to be met in 12 wk:  - Improve FAAM score to 20% or better ability.  - Increase R ankle INV and EV strength to 4+/5 in all directions in order to walk on uneven surfaces safely.  - Increase R ankle DF AROM to 10 deg for improved ease with stepping down curbs in the community.  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    Progress per Plan of Care and Progress strengthening /stabilization /functional activity         Timed:         Manual Therapy:    15      mins  75196;     Therapeutic Exercise:    15     mins  40847;     Neuromuscular Veronica:        mins  44041;    Therapeutic Activity:     5     mins  74693;     Gait Training:           mins  38096;     Ultrasound:     10     mins  84196;    Ionto                                   mins   71893  Self Care                    _____  mins   89639  Canalith Repos                   mins  18307    Un-Timed:  Electrical Stimulation:         mins  48578 ( );  Dry Needling          mins self-pay   Traction          mins 65436    Timed Treatment:   45   mins   Total Treatment:     45   mins    Willard Quarles PTA  [place] License [id number]  Physical Therapist Assistant

## 2023-03-06 NOTE — PROGRESS NOTES
Subjective   Johnny Duran is a 32 y.o. male.   Chief Complaint   Patient presents with   • Annual Exam       History of Present Illness  The patient is here: for coordination of medical care.  Patient has: minimal activity with work/home activities, good appetite, feels well with minor complaints and is sleeping well    COVID-19 Vaccine(1) Never done  TDAP/TD VACCINES(1 - Tdap) Never done  HEPATITIS C SCREENING Never done  ANNUAL PHYSICAL Never done  INFLUENZA VACCINE Never done  Pneumococcal Vaccine 0-64 Aged Out  Current pain scale 0/10.      Still with some ongoing fatigue. Anemia eval unrevealing        The following portions of the patient's history were reviewed and updated as appropriate: allergies, current medications, past family history, past medical history, past social history, past surgical history and problem list.    Patient Active Problem List   Diagnosis   • Class 3 severe obesity due to excess calories with serious comorbidity and body mass index (BMI) of 60.0 to 69.9 in adult (HCC)   • Nondisplaced fracture of fifth left metatarsal bone   • Traumatic arthritis of right knee   • Pain in left foot   • Vitamin D deficiency   • Testosterone deficiency   • Plantar fasciitis of right foot       No current outpatient medications on file prior to visit.     No current facility-administered medications on file prior to visit.     Current outpatient and discharge medications have been reconciled for the patient.  Reviewed by: Edouard Vazquez MD      Allergies   Allergen Reactions   • Codeine Hives   • Bee Venom Swelling       Review of Systems   Constitutional: Negative for activity change, appetite change, fatigue and fever.   HENT: Negative for ear pain, swollen glands and voice change.    Eyes: Negative for visual disturbance.   Respiratory: Negative for shortness of breath and wheezing.    Cardiovascular: Negative for chest pain and leg swelling.   Gastrointestinal: Negative for abdominal pain,  "blood in stool, constipation, diarrhea, nausea and vomiting.   Endocrine: Negative for polydipsia and polyuria.   Genitourinary: Negative for dysuria, frequency and hematuria.   Musculoskeletal: Negative for joint swelling, neck pain and neck stiffness.   Skin: Negative for rash and wound.   Neurological: Negative for weakness, numbness and headache.   Psychiatric/Behavioral: Negative for suicidal ideas and depressed mood.     I have reviewed and confirmed the accuracy of the ROS as documented by the MA/LPN/RN Edouard Vazquez MD    Objective   Visit Vitals  /80 (BP Location: Other (Comment), Patient Position: Sitting, Cuff Size: Adult) Comment (BP Location): right forearm   Pulse 88   Temp 96.8 °F (36 °C) (Temporal)   Resp 18   Ht 188 cm (74\")   Wt (!) 227 kg (501 lb)   SpO2 100%   BMI 64.32 kg/m²      **  Physical Exam  Constitutional:       Appearance: He is well-developed.   HENT:      Head: Normocephalic and atraumatic.      Right Ear: External ear normal.      Left Ear: External ear normal.      Nose: Nose normal.   Eyes:      Pupils: Pupils are equal, round, and reactive to light.   Cardiovascular:      Rate and Rhythm: Normal rate and regular rhythm.      Heart sounds: Normal heart sounds.   Pulmonary:      Effort: Pulmonary effort is normal.      Breath sounds: Normal breath sounds.   Abdominal:      General: Bowel sounds are normal.      Palpations: Abdomen is soft.   Musculoskeletal:         General: Normal range of motion.      Cervical back: Normal range of motion and neck supple.   Skin:     General: Skin is warm and dry.   Neurological:      Mental Status: He is alert and oriented to person, place, and time.   Psychiatric:         Behavior: Behavior normal.         Thought Content: Thought content normal.         Judgment: Judgment normal.       Derm Physical Exam    Diagnoses and all orders for this visit:    1. Annual physical exam (Primary)  -     POCT urinalysis dipstick, manual  -     " CBC & Differential  -     Comprehensive Metabolic Panel  -     Lipid Panel With / Chol / HDL Ratio  -     TSH  -     Hemoglobin A1c  -     Sedimentation Rate  -     Iron Profile  -     Vitamin D 25 hydroxy    2. Class 3 severe obesity due to excess calories with serious comorbidity and body mass index (BMI) of 60.0 to 69.9 in adult (HCC)    3. Non-seasonal allergic rhinitis due to other allergic trigger  -     montelukast (SINGULAIR) 10 MG tablet; Take 1 tablet by mouth Every Night.  Dispense: 30 tablet; Refill: 12    4. Malaise and fatigue  -     Ambulatory Referral to Sleep Medicine    5. Vitamin D deficiency  -     Vitamin D 25 hydroxy    6. Elevated blood sugar level  -     Hemoglobin A1c     Findings discussed. All questions answered.  Discussed anticipatory guidance, diet, exercise, and weight loss.  Discussed safety and routine screening examinations.  Discussed self-examinations.  Class 3 Severe Obesity (BMI >=40). Obesity-related health conditions include the following: none. Obesity is unchanged. BMI is is above average; BMI management plan is completed. We discussed portion control and increasing exercise.     Johnny JATIN Duran  reports that he has never smoked. He has never used smokeless tobacco..     Follow-up for routine health maintenance as indicated.      Expected course, medications, and adverse effects discussed as appropriate.  Call or return if worsening or persistent symptoms.  I wore protective equipment throughout this patient encounter to include mask and eye protection. Hand hygiene was performed before donning protective equipment and after removal when leaving the room.       This document is intended for medical professional use only.

## 2023-03-07 ENCOUNTER — OFFICE VISIT (OUTPATIENT)
Dept: FAMILY MEDICINE CLINIC | Facility: CLINIC | Age: 33
End: 2023-03-07
Payer: COMMERCIAL

## 2023-03-07 VITALS
SYSTOLIC BLOOD PRESSURE: 146 MMHG | HEIGHT: 74 IN | HEART RATE: 88 BPM | WEIGHT: 315 LBS | OXYGEN SATURATION: 100 % | TEMPERATURE: 96.8 F | DIASTOLIC BLOOD PRESSURE: 80 MMHG | BODY MASS INDEX: 40.43 KG/M2 | RESPIRATION RATE: 18 BRPM

## 2023-03-07 DIAGNOSIS — Z00.00 ANNUAL PHYSICAL EXAM: Primary | ICD-10-CM

## 2023-03-07 DIAGNOSIS — E55.9 VITAMIN D DEFICIENCY: ICD-10-CM

## 2023-03-07 DIAGNOSIS — R73.9 ELEVATED BLOOD SUGAR LEVEL: ICD-10-CM

## 2023-03-07 DIAGNOSIS — E66.01 CLASS 3 SEVERE OBESITY DUE TO EXCESS CALORIES WITH SERIOUS COMORBIDITY AND BODY MASS INDEX (BMI) OF 60.0 TO 69.9 IN ADULT: ICD-10-CM

## 2023-03-07 DIAGNOSIS — J30.89 NON-SEASONAL ALLERGIC RHINITIS DUE TO OTHER ALLERGIC TRIGGER: ICD-10-CM

## 2023-03-07 DIAGNOSIS — R53.83 MALAISE AND FATIGUE: ICD-10-CM

## 2023-03-07 DIAGNOSIS — R53.81 MALAISE AND FATIGUE: ICD-10-CM

## 2023-03-07 PROBLEM — R42 DIZZINESS: Status: RESOLVED | Noted: 2021-11-03 | Resolved: 2023-03-07

## 2023-03-07 PROBLEM — S82.024A CLOSED NONDISPLACED LONGITUDINAL FRACTURE OF RIGHT PATELLA: Status: RESOLVED | Noted: 2019-09-09 | Resolved: 2023-03-07

## 2023-03-07 PROBLEM — S90.32XA CONTUSION OF LEFT FOOT: Status: RESOLVED | Noted: 2021-11-19 | Resolved: 2023-03-07

## 2023-03-07 LAB
BILIRUB BLD-MCNC: NEGATIVE MG/DL
CLARITY, POC: CLEAR
COLOR UR: YELLOW
GLUCOSE UR STRIP-MCNC: NEGATIVE MG/DL
KETONES UR QL: NEGATIVE
LEUKOCYTE EST, POC: NEGATIVE
NITRITE UR-MCNC: NEGATIVE MG/ML
PH UR: 7 [PH] (ref 5–8)
PROT UR STRIP-MCNC: ABNORMAL MG/DL
RBC # UR STRIP: ABNORMAL /UL
SP GR UR: 1.01 (ref 1–1.03)
UROBILINOGEN UR QL: NORMAL

## 2023-03-07 PROCEDURE — 81002 URINALYSIS NONAUTO W/O SCOPE: CPT | Performed by: FAMILY MEDICINE

## 2023-03-07 PROCEDURE — 99395 PREV VISIT EST AGE 18-39: CPT | Performed by: FAMILY MEDICINE

## 2023-03-07 RX ORDER — MONTELUKAST SODIUM 10 MG/1
10 TABLET ORAL NIGHTLY
Qty: 30 TABLET | Refills: 12 | Status: SHIPPED | OUTPATIENT
Start: 2023-03-07

## 2023-03-08 ENCOUNTER — PATIENT MESSAGE (OUTPATIENT)
Dept: FAMILY MEDICINE CLINIC | Facility: CLINIC | Age: 33
End: 2023-03-08
Payer: COMMERCIAL

## 2023-03-09 ENCOUNTER — PATIENT MESSAGE (OUTPATIENT)
Dept: FAMILY MEDICINE CLINIC | Facility: CLINIC | Age: 33
End: 2023-03-09
Payer: COMMERCIAL

## 2023-03-09 LAB
25(OH)D3+25(OH)D2 SERPL-MCNC: 16.5 NG/ML (ref 30–100)
ALBUMIN SERPL-MCNC: 3.9 G/DL (ref 4–5)
ALBUMIN/GLOB SERPL: 1.3 {RATIO} (ref 1.2–2.2)
ALP SERPL-CCNC: 102 IU/L (ref 44–121)
ALT SERPL-CCNC: 14 IU/L (ref 0–44)
AST SERPL-CCNC: 13 IU/L (ref 0–40)
BASOPHILS # BLD AUTO: 0 X10E3/UL (ref 0–0.2)
BASOPHILS NFR BLD AUTO: 0 %
BILIRUB SERPL-MCNC: 0.2 MG/DL (ref 0–1.2)
BUN SERPL-MCNC: 15 MG/DL (ref 6–20)
BUN/CREAT SERPL: 23 (ref 9–20)
CALCIUM SERPL-MCNC: 9.1 MG/DL (ref 8.7–10.2)
CHLORIDE SERPL-SCNC: 103 MMOL/L (ref 96–106)
CHOLEST SERPL-MCNC: 161 MG/DL (ref 100–199)
CHOLEST/HDLC SERPL: 3.7 RATIO (ref 0–5)
CO2 SERPL-SCNC: 25 MMOL/L (ref 20–29)
CREAT SERPL-MCNC: 0.66 MG/DL (ref 0.76–1.27)
EGFRCR SERPLBLD CKD-EPI 2021: 128 ML/MIN/1.73
EOSINOPHIL # BLD AUTO: 0.2 X10E3/UL (ref 0–0.4)
EOSINOPHIL NFR BLD AUTO: 3 %
ERYTHROCYTE [DISTWIDTH] IN BLOOD BY AUTOMATED COUNT: 12.9 % (ref 11.6–15.4)
ERYTHROCYTE [SEDIMENTATION RATE] IN BLOOD BY WESTERGREN METHOD: 17 MM/HR (ref 0–15)
GLOBULIN SER CALC-MCNC: 2.9 G/DL (ref 1.5–4.5)
GLUCOSE SERPL-MCNC: 95 MG/DL (ref 70–99)
HBA1C MFR BLD: 5.7 % (ref 4.8–5.6)
HCT VFR BLD AUTO: 37.1 % (ref 37.5–51)
HDLC SERPL-MCNC: 43 MG/DL
HGB BLD-MCNC: 12.2 G/DL (ref 13–17.7)
IMM GRANULOCYTES # BLD AUTO: 0 X10E3/UL (ref 0–0.1)
IMM GRANULOCYTES NFR BLD AUTO: 0 %
IRON SATN MFR SERPL: 18 % (ref 15–55)
IRON SERPL-MCNC: 49 UG/DL (ref 38–169)
LDLC SERPL CALC-MCNC: 106 MG/DL (ref 0–99)
LYMPHOCYTES # BLD AUTO: 2.4 X10E3/UL (ref 0.7–3.1)
LYMPHOCYTES NFR BLD AUTO: 35 %
MCH RBC QN AUTO: 28.4 PG (ref 26.6–33)
MCHC RBC AUTO-ENTMCNC: 32.9 G/DL (ref 31.5–35.7)
MCV RBC AUTO: 86 FL (ref 79–97)
MONOCYTES # BLD AUTO: 0.3 X10E3/UL (ref 0.1–0.9)
MONOCYTES NFR BLD AUTO: 5 %
NEUTROPHILS # BLD AUTO: 3.9 X10E3/UL (ref 1.4–7)
NEUTROPHILS NFR BLD AUTO: 57 %
PLATELET # BLD AUTO: 360 X10E3/UL (ref 150–450)
POTASSIUM SERPL-SCNC: 4.5 MMOL/L (ref 3.5–5.2)
PROT SERPL-MCNC: 6.8 G/DL (ref 6–8.5)
RBC # BLD AUTO: 4.3 X10E6/UL (ref 4.14–5.8)
SODIUM SERPL-SCNC: 140 MMOL/L (ref 134–144)
TIBC SERPL-MCNC: 276 UG/DL (ref 250–450)
TRIGL SERPL-MCNC: 60 MG/DL (ref 0–149)
TSH SERPL DL<=0.005 MIU/L-ACNC: 1.27 UIU/ML (ref 0.45–4.5)
UIBC SERPL-MCNC: 227 UG/DL (ref 111–343)
VLDLC SERPL CALC-MCNC: 12 MG/DL (ref 5–40)
WBC # BLD AUTO: 6.8 X10E3/UL (ref 3.4–10.8)

## 2024-02-02 ENCOUNTER — HOSPITAL ENCOUNTER (OUTPATIENT)
Dept: GENERAL RADIOLOGY | Facility: HOSPITAL | Age: 34
Discharge: HOME | End: 2024-02-02
Admitting: FAMILY MEDICINE
Payer: COMMERCIAL

## 2024-02-02 ENCOUNTER — HOSPITAL ENCOUNTER (OUTPATIENT)
Dept: GENERAL RADIOLOGY | Facility: HOSPITAL | Age: 34
Discharge: HOME | End: 2024-02-02
Payer: COMMERCIAL

## 2024-02-02 ENCOUNTER — OFFICE VISIT (OUTPATIENT)
Dept: FAMILY MEDICINE CLINIC | Facility: CLINIC | Age: 34
End: 2024-02-02
Payer: COMMERCIAL

## 2024-02-02 VITALS
WEIGHT: 315 LBS | SYSTOLIC BLOOD PRESSURE: 132 MMHG | HEART RATE: 88 BPM | RESPIRATION RATE: 20 BRPM | BODY MASS INDEX: 40.43 KG/M2 | HEIGHT: 74 IN | DIASTOLIC BLOOD PRESSURE: 82 MMHG

## 2024-02-02 DIAGNOSIS — M79.672 LEFT FOOT PAIN: ICD-10-CM

## 2024-02-02 DIAGNOSIS — E66.01 CLASS 3 SEVERE OBESITY DUE TO EXCESS CALORIES WITH SERIOUS COMORBIDITY AND BODY MASS INDEX (BMI) OF 60.0 TO 69.9 IN ADULT: ICD-10-CM

## 2024-02-02 DIAGNOSIS — M79.672 CHRONIC HEEL PAIN, LEFT: ICD-10-CM

## 2024-02-02 DIAGNOSIS — M25.552 LEFT HIP PAIN: ICD-10-CM

## 2024-02-02 DIAGNOSIS — Z00.00 ANNUAL PHYSICAL EXAM: Primary | ICD-10-CM

## 2024-02-02 DIAGNOSIS — G89.29 CHRONIC HEEL PAIN, LEFT: ICD-10-CM

## 2024-02-02 PROBLEM — U07.1 COVID-19: Status: ACTIVE | Noted: 2024-02-02

## 2024-02-02 PROCEDURE — 99395 PREV VISIT EST AGE 18-39: CPT | Performed by: FAMILY MEDICINE

## 2024-02-02 PROCEDURE — 73630 X-RAY EXAM OF FOOT: CPT

## 2024-02-02 PROCEDURE — 73502 X-RAY EXAM HIP UNI 2-3 VIEWS: CPT

## 2024-02-02 NOTE — PROGRESS NOTES
Subjective   Johnny Duran is a 33 y.o. male.   Chief Complaint   Patient presents with    Annual Exam    Foot Injury    Hip Pain       History of Present Illness  The patient is here: for coordination of medical care.  Patient has: moderate activity with work/home activities, good appetite, feels well with minor complaints, decreased energy level, and is sleeping well    TDAP/TD VACCINES(2 - Tdap) due on 08/07/2012  HEPATITIS C SCREENING Never done  COVID-19 Vaccine(1) due on 02/04/2024  INFLUENZA VACCINE due on 03/31/2024  ANNUAL PHYSICAL due on 03/07/2024  BMI FOLLOWUP due on 03/07/2024  Pneumococcal Vaccine 0-64 Aged Out  Current pain scale 0/10.       Ongoing left heel pain for months, feels like and actually worse that previous plantar fascitis that he had on his right foot that he had surgery for by Dr Berman. Pt interested in referral for definitive management.  Also left hip pain, ongoing x about 2 weeks. Hurst to bear weight. Had similar sx a few years ago with numbness and tingling about left hip/upper thigh area . States he had a back xray that possibly showed degenerative changes, but no records available.   Foot Injury   The incident occurred more than 1 week ago. The incident occurred at home. The injury mechanism is unknown. The pain is present in the right heel, right foot, left foot and left heel. The quality of the pain is described as stabbing, shooting and burning. The pain is at a severity of 5/10. The pain is moderate. The pain has been Constant since onset. Associated symptoms include an inability to bear weight, a loss of sensation and muscle weakness. Pertinent negatives include no numbness. He reports no foreign bodies present. The symptoms are aggravated by movement, palpation and weight bearing. He has tried nothing for the symptoms. The treatment provided no relief.   Hip Pain   The incident occurred more than 1 week ago. The incident occurred at home. The injury mechanism is unknown.  The pain is present in the left hip. The quality of the pain is described as stabbing and shooting. The pain is at a severity of 7/10. The pain is severe. The pain has been Constant since onset. Associated symptoms include an inability to bear weight, a loss of sensation and muscle weakness. Pertinent negatives include no numbness. He reports no foreign bodies present. The symptoms are aggravated by movement, palpation and weight bearing. The treatment provided no relief.        The following portions of the patient's history were reviewed and updated as appropriate: allergies, current medications, past family history, past medical history, past social history, past surgical history, and problem list.    Patient Active Problem List   Diagnosis    Class 3 severe obesity due to excess calories with serious comorbidity and body mass index (BMI) of 60.0 to 69.9 in adult    Nondisplaced fracture of fifth left metatarsal bone    Traumatic arthritis of right knee    Pain in left foot    Vitamin D deficiency    Testosterone deficiency    Plantar fasciitis of right foot    COVID-19       Current Outpatient Medications on File Prior to Visit   Medication Sig Dispense Refill    [DISCONTINUED] montelukast (SINGULAIR) 10 MG tablet Take 1 tablet by mouth Every Night. 30 tablet 12     No current facility-administered medications on file prior to visit.     Current outpatient and discharge medications have been reconciled for the patient.  Reviewed by: Edouard Vazquez MD      Allergies   Allergen Reactions    Codeine Hives    Bee Venom Swelling       Review of Systems   Constitutional:  Negative for activity change, appetite change, fatigue and fever.   HENT:  Negative for ear pain, swollen glands and voice change.    Eyes:  Negative for visual disturbance.   Respiratory:  Negative for shortness of breath and wheezing.    Cardiovascular:  Negative for chest pain and leg swelling.   Gastrointestinal:  Negative for abdominal  "pain, blood in stool, constipation, diarrhea, nausea and vomiting.   Endocrine: Negative for polydipsia and polyuria.   Genitourinary:  Negative for dysuria, frequency and hematuria.   Musculoskeletal:  Negative for joint swelling, neck pain and neck stiffness.   Skin:  Negative for rash and wound.   Neurological:  Negative for weakness, numbness and headache.   Psychiatric/Behavioral:  Negative for suicidal ideas and depressed mood.      I have reviewed and confirmed the accuracy of the ROS as documented by the MA/LPN/RN Edouard Vazquez MD    Objective   Visit Vitals  /82 (BP Location: Right arm, Patient Position: Sitting, Cuff Size: Large Adult)   Pulse 88   Resp 20   Ht 188 cm (74\")   Wt (!) 231 kg (510 lb)   BMI 65.48 kg/m²      **  Physical Exam  Constitutional:       Appearance: He is well-developed.   HENT:      Head: Normocephalic and atraumatic.      Right Ear: External ear normal.      Left Ear: External ear normal.      Nose: Nose normal.   Eyes:      Pupils: Pupils are equal, round, and reactive to light.   Cardiovascular:      Rate and Rhythm: Normal rate and regular rhythm.      Heart sounds: Normal heart sounds.   Pulmonary:      Effort: Pulmonary effort is normal.      Breath sounds: Normal breath sounds.   Abdominal:      General: Bowel sounds are normal.      Palpations: Abdomen is soft.   Musculoskeletal:         General: Normal range of motion.      Cervical back: Normal range of motion and neck supple.   Skin:     General: Skin is warm and dry.   Neurological:      Mental Status: He is alert and oriented to person, place, and time.   Psychiatric:         Behavior: Behavior normal.         Thought Content: Thought content normal.         Judgment: Judgment normal.       Derm Physical Exam  Ortho Exam   Neurologic Exam     Mental Status   Oriented to person, place, and time.     Cranial Nerves     CN III, IV, VI   Pupils are equal, round, and reactive to light.       Diagnoses and all " orders for this visit:    1. Annual physical exam (Primary)  -     Comprehensive Metabolic Panel  -     CBC & Differential  -     Lipid Panel With / Chol / HDL Ratio  -     TSH  -     Hemoglobin A1c    2. Class 3 severe obesity due to excess calories with serious comorbidity and body mass index (BMI) of 60.0 to 69.9 in adult    3. Left foot pain  -     XR Foot 3+ View Left    4. Chronic heel pain, left  -     diclofenac (VOLTAREN) 50 MG EC tablet; Take 1 tablet by mouth 2 (Two) Times a Day As Needed (pain and inflammation).  Dispense: 60 tablet; Refill: 1  -     Ambulatory Referral to Podiatry    5. Left hip pain  -     XR Hip With or Without Pelvis 2 - 3 View Left  -     diclofenac (VOLTAREN) 50 MG EC tablet; Take 1 tablet by mouth 2 (Two) Times a Day As Needed (pain and inflammation).  Dispense: 60 tablet; Refill: 1     Discussed anticipatory guidance, diet, exercise, and weight loss.  Discussed safety and routine screening examinations.  Discussed self-examinations.  Johnny STEINER Renee  reports that he has never smoked. He has never been exposed to tobacco smoke. He has never used smokeless tobacco..  Follow-up for routine health maintenance as indicated.    Findings discussed. All questions answered.  Differential diagnosis discussed.   Medication and medication adverse effects discussed.  Drug education given and explained to patient. Patient verbalized understanding.    Follow-up after testing complete, sooner for worsening symptoms or any concerns  Follow-up for routine health maintenance as indicated.   Follow-up in 4-6 weeks if not better.  Follow-up sooner for worsening symptoms or for any concerns.         Expected course, medications, and adverse effects discussed as appropriate.  Call or return if worsening or persistent symptoms.     This document is intended for medical professional use only.

## 2024-02-03 LAB
ALBUMIN SERPL-MCNC: 4.1 G/DL (ref 4.1–5.1)
ALBUMIN/GLOB SERPL: 1.3 {RATIO} (ref 1.2–2.2)
ALP SERPL-CCNC: 113 IU/L (ref 44–121)
ALT SERPL-CCNC: 13 IU/L (ref 0–44)
AST SERPL-CCNC: 11 IU/L (ref 0–40)
BASOPHILS # BLD AUTO: 0 X10E3/UL (ref 0–0.2)
BASOPHILS NFR BLD AUTO: 1 %
BILIRUB SERPL-MCNC: 0.3 MG/DL (ref 0–1.2)
BUN SERPL-MCNC: 14 MG/DL (ref 6–20)
BUN/CREAT SERPL: 23 (ref 9–20)
CALCIUM SERPL-MCNC: 9.3 MG/DL (ref 8.7–10.2)
CHLORIDE SERPL-SCNC: 101 MMOL/L (ref 96–106)
CHOLEST SERPL-MCNC: 166 MG/DL (ref 100–199)
CHOLEST/HDLC SERPL: 3.1 RATIO (ref 0–5)
CO2 SERPL-SCNC: 25 MMOL/L (ref 20–29)
CREAT SERPL-MCNC: 0.6 MG/DL (ref 0.76–1.27)
EGFRCR SERPLBLD CKD-EPI 2021: 131 ML/MIN/1.73
EOSINOPHIL # BLD AUTO: 0.1 X10E3/UL (ref 0–0.4)
EOSINOPHIL NFR BLD AUTO: 2 %
ERYTHROCYTE [DISTWIDTH] IN BLOOD BY AUTOMATED COUNT: 13.1 % (ref 11.6–15.4)
GLOBULIN SER CALC-MCNC: 3.1 G/DL (ref 1.5–4.5)
GLUCOSE SERPL-MCNC: 92 MG/DL (ref 70–99)
HBA1C MFR BLD: 5.8 % (ref 4.8–5.6)
HCT VFR BLD AUTO: 38.2 % (ref 37.5–51)
HDLC SERPL-MCNC: 54 MG/DL
HGB BLD-MCNC: 12 G/DL (ref 13–17.7)
IMM GRANULOCYTES # BLD AUTO: 0 X10E3/UL (ref 0–0.1)
IMM GRANULOCYTES NFR BLD AUTO: 0 %
LDLC SERPL CALC-MCNC: 96 MG/DL (ref 0–99)
LYMPHOCYTES # BLD AUTO: 2.9 X10E3/UL (ref 0.7–3.1)
LYMPHOCYTES NFR BLD AUTO: 35 %
MCH RBC QN AUTO: 27.5 PG (ref 26.6–33)
MCHC RBC AUTO-ENTMCNC: 31.4 G/DL (ref 31.5–35.7)
MCV RBC AUTO: 87 FL (ref 79–97)
MONOCYTES # BLD AUTO: 0.4 X10E3/UL (ref 0.1–0.9)
MONOCYTES NFR BLD AUTO: 5 %
NEUTROPHILS # BLD AUTO: 4.9 X10E3/UL (ref 1.4–7)
NEUTROPHILS NFR BLD AUTO: 57 %
PLATELET # BLD AUTO: 440 X10E3/UL (ref 150–450)
POTASSIUM SERPL-SCNC: 4.8 MMOL/L (ref 3.5–5.2)
PROT SERPL-MCNC: 7.2 G/DL (ref 6–8.5)
RBC # BLD AUTO: 4.37 X10E6/UL (ref 4.14–5.8)
SODIUM SERPL-SCNC: 139 MMOL/L (ref 134–144)
TRIGL SERPL-MCNC: 87 MG/DL (ref 0–149)
TSH SERPL DL<=0.005 MIU/L-ACNC: 1.85 UIU/ML (ref 0.45–4.5)
VLDLC SERPL CALC-MCNC: 16 MG/DL (ref 5–40)
WBC # BLD AUTO: 8.4 X10E3/UL (ref 3.4–10.8)

## 2024-02-05 ENCOUNTER — PATIENT MESSAGE (OUTPATIENT)
Dept: FAMILY MEDICINE CLINIC | Facility: CLINIC | Age: 34
End: 2024-02-05
Payer: COMMERCIAL

## 2024-02-05 DIAGNOSIS — M25.552 LEFT HIP PAIN: Primary | ICD-10-CM

## 2024-02-06 NOTE — TELEPHONE ENCOUNTER
Spoke with patient and he has appointment for devin for his heel. I entered referral for ortho for hip today

## 2024-02-08 ENCOUNTER — OFFICE VISIT (OUTPATIENT)
Dept: ORTHOPEDIC SURGERY | Facility: CLINIC | Age: 34
End: 2024-02-08
Payer: COMMERCIAL

## 2024-02-08 VITALS — WEIGHT: 315 LBS | OXYGEN SATURATION: 96 % | HEIGHT: 74 IN | BODY MASS INDEX: 40.43 KG/M2 | HEART RATE: 80 BPM

## 2024-02-08 DIAGNOSIS — M54.16 LUMBAR NERVE ROOT ENTRAPMENT: ICD-10-CM

## 2024-02-08 DIAGNOSIS — M54.42 ACUTE LEFT-SIDED LOW BACK PAIN WITH LEFT-SIDED SCIATICA: ICD-10-CM

## 2024-02-08 DIAGNOSIS — M54.9 MID BACK PAIN: Primary | ICD-10-CM

## 2024-02-08 NOTE — PROGRESS NOTES
[facility]        Patient Name: Johnny Duran  : 1990  Primary Care Physician: Edouard Vazquez MD        Chief Complaint:    Chief Complaint   Patient presents with    Left Hip - Initial Evaluation, Pain        HPI:   Johnny Duran is a 33 y.o. year old who presents today for evaluation of left hip pain.    Location of pain is over the left, anterior to lateral thigh only. The pain does not radiate. Started ~ 2 years ago but over the last few months intensity has increased. Described as burning, itching sensation with sharp/stabbing sensations at times. Pain increased when lying flat. He denies any recent rash or viral illness. Denies recent, rapid weight gain. Denies back injury, hip injury, leg injury. H/o left metatarsal crushing injury with plate and screws.             Past Medical/Surgical, Social and Family History:  I have reviewed and/or updated pertinent history as noted in the medical record including:  Past Medical History:   Diagnosis Date    Anemia     Encounter for screening for malignant neoplasm of rectum     Gallstones     Nausea and vomiting in adult     Nondisplaced fracture of fifth left metatarsal bone     Nondisplaced longitudinal fracture of right patella, initial encounter for closed fracture     Impression: recommend ortho eval, appt today.    Obesity     Obesity, morbid, BMI 40.0-49.9     Overweight (BMI 25.0-29.9)     Plantar fasciitis     right    Screening for depression     Screening for hyperlipidemia     Stress fracture     Traumatic arthritis of right knee      Past Surgical History:   Procedure Laterality Date    CHOLECYSTECTOMY      ORIF FOOT FRACTURE Left     PLANTAR FASCIA RELEASE Right 10/3/2022    Procedure: ENDOSCOPIC PLANTAR FASCIOTOMY;  Surgeon: CASEY Berman DPM;  Location: [facility] MAIN OR;  Service: Podiatry;  Laterality: Right;     Social History     Occupational History    Not on file   Tobacco Use    Smoking status: Never     Passive exposure:  Never    Smokeless tobacco: Never   Vaping Use    Vaping Use: Never used   Substance and Sexual Activity    Alcohol use: Yes     Comment: Drinks beer, Drinks hard liquor. 6 drinks monthly    Drug use: No    Sexual activity: Defer          Allergies:   Allergies   Allergen Reactions    Codeine Hives    Bee Venom Swelling       Medications:   Home Medications:  Current Outpatient Medications on File Prior to Visit   Medication Sig    diclofenac (VOLTAREN) 50 MG EC tablet Take 1 tablet by mouth 2 (Two) Times a Day As Needed (pain and inflammation).     No current facility-administered medications on file prior to visit.         ROS:  Negative unless listed in the HPI    Physical Exam:   33 y.o. male  Body mass index is 65.48 kg/m²., (!) 231 kg (510 lb)  Vitals:    02/08/24 0804   Pulse: 80   SpO2: 96%     General: Alert, cooperative, appears well and in no observable distress.   HEENT: Normocephalic, atraumatic on external visual inspection. No icterus.   CV: No significant peripheral edema.    Respiratory: Normal respiratory effort.   Skin: Warm & well perfused; appropriate skin turgor.  Psych: Appropriate mood & affect.  Neuro: Gross sensation and motor intact in affected extremity/extremities.  Vascular: Peripheral pulses palpable in affected extremity/extremities.     Ortho Exam     Exam limited due to body habitus  Equal LE strength bilaterally  Normal sensation  No hip mobility dysfunction  Gait is normal      Radiology:  XR HIP W OR WO PELVIS 2-3 VIEW LEFT     Date of Exam: 2/2/2024 10:08 AM EST     Indication: hip pain     Comparison: None available.     Findings:  There is some narrowing of the hip joint. There is no fracture or dislocation.     IMPRESSION:  Impression:  1.There is some suggested narrowing of the hip joint.        Electronically Signed: Niko Garnica MD    2/2/2024 3:56 PM EST    Workstation ID: [id number]    Assessment:  Left anterior thigh pain - likely r/t lumbar nerve entrapment  Body mass  index is 65.48 kg/m².  BMI consistent with Obese Class III extreme obesity: > or equal to 40kg/m2           Plan:  T/L spine xray ordered and reviewed today - no obvious concerns. Will wait for final read and call patient with any concerning issues  Reviewed recent left hip xray  Discussed the above assessment and imaging with the patient  His symptoms are more consistent with neurological etiology. The hip joint is overall normal other than mild joint narrowing  Recommend referral and evaluation to [facility] - referral placed today  Ok to continue Diclofenac PRN. Discussed weight loss.   Follow up PRN  Patient encouraged to call with any questions or concerns in the interim    TERESITA Mckinley

## 2024-02-13 ENCOUNTER — TELEPHONE (OUTPATIENT)
Dept: BARIATRICS/WEIGHT MGMT | Facility: CLINIC | Age: 34
End: 2024-02-13
Payer: COMMERCIAL

## 2024-02-13 ENCOUNTER — OFFICE VISIT (OUTPATIENT)
Dept: NEUROSURGERY | Facility: CLINIC | Age: 34
End: 2024-02-13
Payer: COMMERCIAL

## 2024-02-13 VITALS
HEART RATE: 82 BPM | HEIGHT: 74 IN | WEIGHT: 315 LBS | BODY MASS INDEX: 40.43 KG/M2 | DIASTOLIC BLOOD PRESSURE: 76 MMHG | SYSTOLIC BLOOD PRESSURE: 131 MMHG

## 2024-02-13 DIAGNOSIS — G57.12 MERALGIA PARESTHETICA OF LEFT SIDE: Primary | ICD-10-CM

## 2024-02-13 PROCEDURE — 99214 OFFICE O/P EST MOD 30 MIN: CPT | Performed by: NURSE PRACTITIONER

## 2024-02-13 RX ORDER — DEXAMETHASONE 1.5 MG/1
1.5 TABLET ORAL TAKE AS DIRECTED
Qty: 35 TABLET | Refills: 0 | Status: SHIPPED | OUTPATIENT
Start: 2024-02-13 | End: 2024-02-16 | Stop reason: SDUPTHER

## 2024-02-14 ENCOUNTER — PATIENT ROUNDING (BHMG ONLY) (OUTPATIENT)
Dept: NEUROSURGERY | Facility: CLINIC | Age: 34
End: 2024-02-14
Payer: COMMERCIAL

## 2024-02-16 ENCOUNTER — TELEPHONE (OUTPATIENT)
Dept: NEUROSURGERY | Facility: CLINIC | Age: 34
End: 2024-02-16
Payer: COMMERCIAL

## 2024-02-16 RX ORDER — DEXAMETHASONE 1.5 MG/1
1.5 TABLET ORAL TAKE AS DIRECTED
Qty: 35 TABLET | Refills: 0 | Status: SHIPPED | OUTPATIENT
Start: 2024-02-16

## 2024-04-18 ENCOUNTER — OFFICE VISIT (OUTPATIENT)
Dept: PODIATRY | Facility: CLINIC | Age: 34
End: 2024-04-18
Payer: COMMERCIAL

## 2024-04-18 VITALS — BODY MASS INDEX: 40.43 KG/M2 | RESPIRATION RATE: 20 BRPM | HEIGHT: 74 IN | WEIGHT: 315 LBS

## 2024-04-18 DIAGNOSIS — E66.01 MORBID OBESITY WITH BMI OF 60.0-69.9, ADULT: ICD-10-CM

## 2024-04-18 DIAGNOSIS — M72.2 PLANTAR FASCIITIS, LEFT: ICD-10-CM

## 2024-04-18 DIAGNOSIS — M79.672 LEFT FOOT PAIN: Primary | ICD-10-CM

## 2024-04-18 RX ORDER — TRIAMCINOLONE ACETONIDE 40 MG/ML
20 INJECTION, SUSPENSION INTRA-ARTICULAR; INTRAMUSCULAR ONCE
Status: COMPLETED | OUTPATIENT
Start: 2024-04-18 | End: 2024-04-18

## 2024-04-18 RX ADMIN — TRIAMCINOLONE ACETONIDE 20 MG: 40 INJECTION, SUSPENSION INTRA-ARTICULAR; INTRAMUSCULAR at 11:11

## 2024-04-18 NOTE — PROGRESS NOTES
"04/18/2024  Foot and Ankle Surgery - Established Patient/Follow-up  Provider: Dr. Savage Berman DPM  Location: [facility]    Subjective:  Johnny Duran is a 33 y.o. male.     Chief Complaint   Patient presents with    Left Foot - Follow-up, Pain    Follow-up     CARLOS OJEDA MD 2/2/2024       HPI: Patient presents for prominent pain involving the plantar aspect of his left heel.  Patient was seen previously and had similar issues involving his right heel.  He states that the right lower extremity is doing relatively well today.  He feels that symptoms could be secondary to plantar fasciitis.    Allergies   Allergen Reactions    Codeine Hives    Bee Venom Swelling       No current outpatient medications on file prior to visit.     No current facility-administered medications on file prior to visit.       Objective   Resp 20   Ht 188 cm (74\")   Wt (!) 239 kg (527 lb)   BMI 67.66 kg/m²     General:   Appearance: appears stated age and healthy and obesity    Orientation: AAOx3    Affect: appropriate    Gait: antalgic       VASCULAR       Right Foot Vascularity   Normal vascular exam    Dorsalis pedis:  2+  Posterior tibial:  2+  Skin Temperature: warm    Edema Grading:  None  CFT:  < 3 seconds  Pedal Hair Growth:  Present  Varicosities: none        NEUROLOGIC      Right Foot Neurologic   Light touch sensation:  Normal  Hot/Cold sensation: normal    Achilles reflex:  2+      MUSCULOSKELETAL       Right Foot Musculoskeletal   Ecchymosis:  None  Tenderness: plantar fascia and plantar heel    Arch:  Normal      MUSCLE STRENGTH      Right Foot Muscle Strength   Normal strength    Foot dorsiflexion:  5  Foot plantar flexion:  5  Foot inversion:  5  Foot eversion:  5      DERMATOLOGIC      Right Foot Dermatologic   Skin: skin intact        Right Foot Additional Comments Discomfort with palpation involving the plantar medial calcaneal tuberosity region. No progressive deformity or instability.   "   01/26/2023  Substantially less pain, with palpation involving the heel.    4/18/24: Prominent discomfort involving the plantar medial calcaneal tuberosity of the left lower extremity.  No progressive deformity or instability moderate equinus contracture with knee extended and flexed.    Assessment & Plan   Diagnoses and all orders for this visit:    1. Left foot pain (Primary)  -     XR Foot 3+ View Left  -     Injection Tendon or Ligament  -     triamcinolone acetonide (KENALOG-40) injection 20 mg    2. Plantar fasciitis, left  -     Injection Tendon or Ligament  -     triamcinolone acetonide (KENALOG-40) injection 20 mg    3. Morbid obesity with BMI of 60.0-69.9, adult      Patient presents for prominent pain involving the plantar aspect of the left heel.  After evaluation, I have explained that his symptoms are consistent with Planter fasciitis.  We reviewed the diagnosis and treatment options in office.  I have recommended that we proceed with a steroid injection.  We reviewed risks and benefits.  Procedure was performed without complication.  I have asked that he acquire a new pair of arch supports and we discussed proper shoes.  Patient understands stretching and manual therapy exercises.  I have also discussed the importance of weight loss.  Patient understands and agrees.  We reviewed RICE therapy and proper use of OTC anti-inflammatories.  Greater than 20 minutes spent before, during, and after evaluation for patient care.    Plantar Fascia Steroid Injection: Left    Consent and time out was performed before proceeding with the procedure.  The area of maximal tenderness was palpated near the plantar medial calcaneal tuberosity of left foot.  The area was cleansed with alcohol.  Under ultrasound guidance, the plantar fascia was visualized and a solution totaling 1.5 mL was injected about the origin of the plantar fascia.  The solution contained 0.5 mL of 1% lidocaine plain, 0.5 mL of 0.5% Marcaine plain,  and 0.5 mL of Kenalog.  After the injection, the patient noted immediate pain relief.  Mild compression was placed at the injection site followed by a sterile bandage.  The patient tolerated the injection well without complication.      Orders Placed This Encounter   Procedures    Injection Tendon or Ligament     Order Specific Question:   Release to patient     Answer:   Routine Release [[id number]]    XR Foot 3+ View Left     Order Specific Question:   Reason for Exam:     Answer:   heel pain x 4 months    room 9  wb.     Order Specific Question:   Release to patient     Answer:   Routine Release [[id number]]          Note is dictated utilizing voice recognition software. Unfortunately this leads to occasional typographical errors. I apologize in advance if the situation occurs. If questions occur please do not hesitate to call our office.    Transcribed from ambient dictation for CASEY Berman DPM by Aria Ge.  04/18/24   10:52 EDT    Patient or patient representative verbalized consent to the visit recording.  I have personally performed the services described in this document as transcribed by the above individual, and it is both accurate and complete.

## 2024-05-14 ENCOUNTER — OFFICE VISIT (OUTPATIENT)
Dept: FAMILY MEDICINE CLINIC | Facility: CLINIC | Age: 34
End: 2024-05-14
Payer: COMMERCIAL

## 2024-05-14 VITALS
TEMPERATURE: 97.5 F | WEIGHT: 315 LBS | SYSTOLIC BLOOD PRESSURE: 132 MMHG | RESPIRATION RATE: 18 BRPM | DIASTOLIC BLOOD PRESSURE: 74 MMHG | HEIGHT: 74 IN | OXYGEN SATURATION: 97 % | BODY MASS INDEX: 40.43 KG/M2 | HEART RATE: 108 BPM

## 2024-05-14 DIAGNOSIS — R42 VERTIGO: ICD-10-CM

## 2024-05-14 DIAGNOSIS — D35.2 PITUITARY MACROADENOMA: Primary | ICD-10-CM

## 2024-05-14 PROCEDURE — 99214 OFFICE O/P EST MOD 30 MIN: CPT | Performed by: FAMILY MEDICINE

## 2024-05-14 RX ORDER — MECLIZINE HYDROCHLORIDE 25 MG/1
25 TABLET ORAL DAILY PRN
COMMUNITY

## 2024-05-14 NOTE — PROGRESS NOTES
Subjective   Johnny Duran is a 33 y.o. male.   Chief Complaint   Patient presents with    Dizziness           Hospital Follow Up Visit       History of Present Illness  Patient here to follow up from [facility] ER on 5-13 for dizziness and nausea x one day. No fever, chills, or head trauma. CT was performed and showed pituitary mass.    Vertigo better   Dizziness  This is a new problem. The current episode started 1 to 4 weeks ago. The problem occurs intermittently. The problem has been gradually improving. Associated symptoms include nausea. Pertinent negatives include no chills, congestion, coughing, fatigue, fever, myalgias or vomiting. Nothing aggravates the symptoms.        The following portions of the patient's history were reviewed and updated as appropriate: allergies, current medications, past family history, past medical history, past social history, past surgical history, and problem list.    Patient Active Problem List   Diagnosis    Class 3 severe obesity due to excess calories with serious comorbidity and body mass index (BMI) of 60.0 to 69.9 in adult    Nondisplaced fracture of fifth left metatarsal bone    Traumatic arthritis of right knee    Pain in left foot    Vitamin D deficiency    Testosterone deficiency    Plantar fasciitis of right foot    COVID-19    Meralgia paresthetica of left side    Pituitary macroadenoma       Current Outpatient Medications on File Prior to Visit   Medication Sig Dispense Refill    meclizine (ANTIVERT) 25 MG tablet Take 1 tablet by mouth Daily As Needed for Dizziness.       No current facility-administered medications on file prior to visit.     Current outpatient and discharge medications have been reconciled for the patient.  Reviewed by: Edouard Vazquez MD      Allergies   Allergen Reactions    Codeine Hives    Bee Venom Swelling       Review of Systems   Constitutional:  Negative for chills, fatigue and fever.   HENT:  Negative for congestion.    Respiratory:   "Negative for cough.    Gastrointestinal:  Positive for nausea. Negative for vomiting.   Musculoskeletal:  Negative for myalgias.   Neurological:  Positive for dizziness.     I have reviewed and confirmed the accuracy of the ROS as documented by the MA/LPN/RN Edouard Vazquez MD    Objective   Visit Vitals  /74   Pulse 108   Temp 97.5 °F (36.4 °C) (Temporal)   Resp 18   Ht 188 cm (74\")   Wt (!) 236 kg (520 lb)   SpO2 97%   BMI 66.76 kg/m²      **  Physical Exam  Constitutional:       Appearance: He is well-developed.   HENT:      Head: Normocephalic and atraumatic.      Right Ear: External ear normal.      Left Ear: External ear normal.      Nose: Nose normal.   Eyes:      Pupils: Pupils are equal, round, and reactive to light.   Cardiovascular:      Rate and Rhythm: Normal rate and regular rhythm.      Heart sounds: Normal heart sounds.   Pulmonary:      Effort: Pulmonary effort is normal.      Breath sounds: Normal breath sounds.   Abdominal:      General: Bowel sounds are normal.      Palpations: Abdomen is soft.   Musculoskeletal:         General: Normal range of motion.      Cervical back: Normal range of motion and neck supple.   Skin:     General: Skin is warm and dry.   Neurological:      Mental Status: He is alert and oriented to person, place, and time.   Psychiatric:         Behavior: Behavior normal.         Thought Content: Thought content normal.         Judgment: Judgment normal.       Derm Physical Exam  Ortho Exam   Neurologic Exam     Mental Status   Oriented to person, place, and time.     Cranial Nerves     CN III, IV, VI   Pupils are equal, round, and reactive to light.     Problem List Items Addressed This Visit       Pituitary macroadenoma - Primary    Overview     Suprasellar.  Noted incidentally.   [facility] MRI and neurosurg eval, may need endocrine eval as well depending on MRI and initial eval          Relevant Orders    TSH    Cortisol    Prolactin    Growth Hormone    Follicle " Stimulating Hormone    Luteinizing Hormone    Ambulatory Referral to Neurosurgery    MRI Brain With & Without Contrast     Other Visit Diagnoses       Vertigo        improved, will follow. Likely unrelated to radiologic findings              Findings discussed. All questions answered.  Differential diagnosis discussed.   Follow-up after testing complete, sooner for worsening symptoms or any concerns            Expected course, medications, and adverse effects discussed as appropriate.  Call or return if worsening or persistent symptoms.     This document is intended for medical professional use only.   Electronically signed by Edouard Vazquez MD on 05/14/2024. This content may not have been proofread.

## 2024-05-15 LAB
CORTIS SERPL-MCNC: 7.1 UG/DL (ref 6.2–19.4)
FSH SERPL-ACNC: <0.3 MIU/ML (ref 1.5–12.4)
LH SERPL-ACNC: <0.3 MIU/ML (ref 1.7–8.6)
PROLACTIN SERPL-MCNC: 198 NG/ML (ref 3.9–22.7)
TSH SERPL DL<=0.005 MIU/L-ACNC: 1.36 UIU/ML (ref 0.45–4.5)

## 2024-05-16 LAB — GH SERPL-MCNC: 0.2 NG/ML (ref 0–10)

## 2024-05-27 NOTE — ANESTHESIA PREPROCEDURE EVALUATION
Anesthesia Evaluation     Patient summary reviewed and Nursing notes reviewed   no history of anesthetic complications:  NPO Solid Status: > 8 hours  NPO Liquid Status: > 2 hours           Airway   Mallampati: II  TM distance: >3 FB  Neck ROM: full  No difficulty expected  Dental - normal exam     Pulmonary - negative pulmonary ROS and normal exam   Cardiovascular - negative cardio ROS and normal exam        Neuro/Psych  (+) dizziness/light headedness,    GI/Hepatic/Renal/Endo    (+) obesity, morbid obesity,      Musculoskeletal     (+) arthralgias,   Abdominal   (+) obese,    Substance History - negative use     OB/GYN negative ob/gyn ROS         Other   arthritis,          Phys Exam Other: LARGE CHIP #9              Anesthesia Plan    ASA 3     general     intravenous induction     Anesthetic plan, risks, benefits, and alternatives have been provided, discussed and informed consent has been obtained with: patient.  Pre-procedure education provided  Plan discussed with CAA and CRNA.        CODE STATUS:       
Intact

## 2024-06-04 ENCOUNTER — TELEPHONE (OUTPATIENT)
Dept: FAMILY MEDICINE CLINIC | Facility: CLINIC | Age: 34
End: 2024-06-04
Payer: COMMERCIAL

## 2024-06-04 NOTE — TELEPHONE ENCOUNTER
Kellee from [facility] [employer] called stating that Mr. Duran has a MRI scheduled but the referral has not been approved yet. She is asking that if it's not approved by tomorrow evening, if the MRI can be rescheduled. She can be reached at [phone].

## 2024-06-06 ENCOUNTER — HOSPITAL ENCOUNTER (OUTPATIENT)
Dept: MRI IMAGING | Facility: HOSPITAL | Age: 34
Discharge: HOME | End: 2024-06-06
Payer: COMMERCIAL

## 2024-06-12 NOTE — PROGRESS NOTES
"Subjective   History of Present Illness: Johnny Duran is a 34 y.o. male is being seen for consultation today at the request of Edouard Vazquez, * for incidentally found pituitary tumor.  Patient developed some dizziness and headaches over the last few weeks and was seen in an outside ER facility with initial CT findings of pituitary mass.  He followed up with his primary care provider who ordered more dedicated imaging.  His dizziness is more of a vertigo type of feeling that began a few weeks ago.  He has had on and off headaches but nothing too severe.He reports no visual loss, speech issues, facial palsies, erectile dysfunction, or known infertility issues.  His last ophthalmologic exam was approximately 2 years ago.     Patient has been seen in the past by myself for symptoms of meralgia paresthetica.  History of Present Illness    The following portions of the patient's history were reviewed and updated as appropriate: allergies, current medications, past family history, past medical history, past social history, past surgical history, and problem list.    Review of Systems   Constitutional:  Positive for activity change.   HENT: Negative.     Eyes: Negative.    Respiratory: Negative.     Cardiovascular: Negative.    Gastrointestinal: Negative.    Endocrine: Negative.    Genitourinary: Negative.    Musculoskeletal: Negative.    Skin: Negative.    Allergic/Immunologic: Negative.    Neurological:  Positive for dizziness and headaches.   Hematological: Negative.    Psychiatric/Behavioral: Negative.     All other systems reviewed and are negative.      Objective     /94 Comment: had to take on his wrist  Pulse 80   Resp 18   Ht 188 cm (74\")   Wt (!) 241 kg (532 lb)   SpO2 96%   BMI 68.30 kg/m²    Body mass index is 68.3 kg/m².    Vitals:    06/18/24 0923   PainSc: 0-No pain          Physical Exam  Neurologic Exam  Alert and oriented by 3  Speech is intact and coherent articulate with good " content and production  Cranial nerves III through XII are grossly intact with pupils symmetric and reactive and no gaze paresis or nystagmus  Sensation is intact to soft touch and pinprick  in both upper and lower extremities  Proprioception is intact in both upper and lower extremities symmetric  Motor strength is 5/5 in both upper and lower extremities with no focal motor deficits  Gait is nonantalgic  Heel toe walking is intact  No dysmetria or dysdiadochokinesia      Assessment & Plan   Independent Review of Radiographic Studies:      I personally reviewed the images from the following studies.    MRI pituitary 6/13/24    Invasive fairly homogeneously enhancing pituitary mass consistent with likely pituitary macroadenoma. There is invasion of both cavernous sinuses, greater on the left including encasement of the ICA which remains patent. Suprasellar extent is noted anteriorly, with the optic chiasm not specifically compressed.    Medical Decision Making:      Johnny Duranis a 34 y.o. 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  Pts only reported symptoms are vertigo and on and off headaches.  Physical exam was noticeably benign with no overt cranial nerve palsies.  Given the size of the tumor, I would like patient to follow-up with Dr. Frank in [place] for further recommendations.  I have spoken to Dr. Frank who is in agreement and we will work to get patient scheduled at his next available appointment.  Patient agrees to plan of care and wishes to proceed.  I encouraged him call the office with any questions or concerns.      Diagnoses and all orders for this visit:    1. Pituitary macroadenoma (Primary)      Return for Next scheduled follow up.    This patient was examined wearing appropriate personal protective equipment.     Johnny Duran  reports that he has never smoked. He has never been exposed  to tobacco smoke. He has never used smokeless tobacco.      Body mass index is 68.3 kg/m².  Class 3 Severe Obesity (BMI >=40). Obesity-related health conditions include the following none.  obesity is unchanged. BMI is above average; BMI management plan is completed.  Recommendations for portion control and increasing exercise.     Patient's blood pressure was reviewed.  Recommendations for  a low-salt diet and exercise to maintain/improve BP in addition to taking any presribed medications.               Jessica Moise DNP, APRN    06/18/24  10:34 EDT

## 2024-06-13 ENCOUNTER — HOSPITAL ENCOUNTER (OUTPATIENT)
Dept: MRI IMAGING | Facility: HOSPITAL | Age: 34
Discharge: HOME | End: 2024-06-13
Admitting: FAMILY MEDICINE
Payer: COMMERCIAL

## 2024-06-13 DIAGNOSIS — D35.2 PITUITARY MACROADENOMA: ICD-10-CM

## 2024-06-13 PROCEDURE — A9579 GAD-BASE MR CONTRAST NOS,1ML: HCPCS | Performed by: FAMILY MEDICINE

## 2024-06-13 PROCEDURE — 25010000002 GADOTERIDOL PER 1 ML: Performed by: FAMILY MEDICINE

## 2024-06-13 PROCEDURE — 70553 MRI BRAIN STEM W/O & W/DYE: CPT

## 2024-06-13 RX ADMIN — GADOTERIDOL 20 ML: 279.3 INJECTION, SOLUTION INTRAVENOUS at 17:04

## 2024-06-18 ENCOUNTER — OFFICE VISIT (OUTPATIENT)
Dept: NEUROSURGERY | Facility: CLINIC | Age: 34
End: 2024-06-18
Payer: COMMERCIAL

## 2024-06-18 ENCOUNTER — TELEPHONE (OUTPATIENT)
Dept: NEUROSURGERY | Facility: CLINIC | Age: 34
End: 2024-06-18
Payer: COMMERCIAL

## 2024-06-18 VITALS
HEART RATE: 80 BPM | HEIGHT: 74 IN | BODY MASS INDEX: 40.43 KG/M2 | DIASTOLIC BLOOD PRESSURE: 94 MMHG | WEIGHT: 315 LBS | RESPIRATION RATE: 18 BRPM | OXYGEN SATURATION: 96 % | SYSTOLIC BLOOD PRESSURE: 138 MMHG

## 2024-06-18 DIAGNOSIS — D35.2 PITUITARY MACROADENOMA: Primary | ICD-10-CM

## 2024-06-18 PROCEDURE — 99214 OFFICE O/P EST MOD 30 MIN: CPT | Performed by: NURSE PRACTITIONER

## 2024-06-18 NOTE — TELEPHONE ENCOUNTER
Received call from Jessica Moise APRN office. Spoke with Waqar.  wanted to get patient scheduled with him after speaking with Jessica Moise APRN. Reached out to patient. Left VM. Informed patient that he is scheduled with  on 06/21/2024 at 8 am. Asked him to call back office to confirm.

## 2024-06-20 NOTE — PROGRESS NOTES
Patient ID: Johnny Duran is a 34 y.o. male is being seen for consultation today at the request of Edouard Vazquez, * for pituitary macroadenoma.    Imaging: MRI of the pituitary gland performed on 06/13/2024    Subjective     The patient is here in regards to   Chief Complaint   Patient presents with    Brain Tumor       History of Present Illness  Even had a CT scan MRI of his brain as part of workup for vertigo and dizziness and lightheadedness episode.  He has not been to see an endocrinologist.  Denies any visual issues.  Denies any other major medical issues      While in the room and during my examination of the patient I wore a mask and eye protection.  I washed my hands before and after this patient encounter.  The patient was also wearing a mask.    The following portions of the patient's history were reviewed and updated as appropriate: allergies, current medications, past family history, past medical history, past social history, past surgical history and problem list.    Review of Systems   Constitutional:  Negative for fever.   HENT:  Negative for congestion and tinnitus.    Eyes:  Negative for visual disturbance.   Respiratory:  Negative for chest tightness and shortness of breath.    Cardiovascular:  Negative for chest pain.   Gastrointestinal:  Negative for diarrhea, nausea and vomiting.   Endocrine: Negative for cold intolerance and heat intolerance.   Genitourinary:  Negative for difficulty urinating.   Musculoskeletal:  Negative for back pain, neck pain and neck stiffness.   Skin:  Negative for rash.   Allergic/Immunologic: Negative for food allergies.   Neurological:  Positive for dizziness (vertigo), light-headedness and headaches. Negative for weakness and numbness.   Hematological:  Bruises/bleeds easily.   Psychiatric/Behavioral:  Negative for confusion and decreased concentration.         Past Medical History:   Diagnosis Date    Anemia     Encounter for screening for malignant  neoplasm of rectum     Gallstones     Nausea and vomiting in adult     Nondisplaced fracture of fifth left metatarsal bone     Nondisplaced longitudinal fracture of right patella, initial encounter for closed fracture     Impression: recommend ortho eval, appt today.    Obesity     Obesity, morbid, BMI 40.0-49.9     Overweight (BMI 25.0-29.9)     Plantar fasciitis     right    Screening for depression     Screening for hyperlipidemia     Stress fracture     Traumatic arthritis of right knee        Allergies   Allergen Reactions    Codeine Hives    Bee Venom Swelling       Family History   Problem Relation Age of Onset    Diabetes Father     Colon cancer Maternal Grandfather     Diabetes Paternal Grandfather        Social History     Socioeconomic History    Marital status: Single   Tobacco Use    Smoking status: Never     Passive exposure: Never    Smokeless tobacco: Never   Vaping Use    Vaping status: Never Used   Substance and Sexual Activity    Alcohol use: Yes     Comment: Drinks beer, Drinks hard liquor. 6 drinks monthly    Drug use: No    Sexual activity: Defer       Past Surgical History:   Procedure Laterality Date    CHOLECYSTECTOMY      ORIF FOOT FRACTURE Left     PLANTAR FASCIA RELEASE Right 10/3/2022    Procedure: ENDOSCOPIC PLANTAR FASCIOTOMY;  Surgeon: CASEY Berman DPM;  Location: [facility] MAIN OR;  Service: Podiatry;  Laterality: Right;         Objective     Vitals:    06/21/24 0803   BP: 128/68   Pulse: 96   Resp: 16   Temp: 98.1 °F (36.7 °C)   SpO2: 97%     Body mass index is 67.96 kg/m².    Physical Exam  Constitutional:       Appearance: Normal appearance.   HENT:      Head: Normocephalic and atraumatic.   Eyes:      Extraocular Movements: Extraocular movements intact.      Conjunctiva/sclera: Conjunctivae normal.      Pupils: Pupils are equal, round, and reactive to light.   Cardiovascular:      Rate and Rhythm: Normal rate and regular rhythm.      Pulses: Normal pulses.   Pulmonary:       Breath sounds: Normal breath sounds.   Abdominal:      Palpations: Abdomen is soft.   Musculoskeletal:         General: Normal range of motion.      Cervical back: Normal range of motion and neck supple.   Skin:     General: Skin is warm and dry.   Neurological:      Mental Status: He is alert and oriented to person, place, and time.      Cranial Nerves: Cranial nerves 2-12 are intact.      Motor: Motor function is intact. No weakness or atrophy.      Coordination: Coordination is intact. Romberg sign negative. Romberg Test normal.      Gait: Gait is intact. Gait normal.      Deep Tendon Reflexes: Reflexes are normal and symmetric.      Reflex Scores:       Tricep reflexes are 2+ on the right side and 2+ on the left side.       Bicep reflexes are 2+ on the right side and 2+ on the left side.       Brachioradialis reflexes are 2+ on the right side and 2+ on the left side.       Patellar reflexes are 2+ on the right side and 2+ on the left side.       Achilles reflexes are 2+ on the right side and 2+ on the left side.  Psychiatric:         Speech: Speech normal.         Neurologic Exam     Mental Status   Oriented to person, place, and time.   Attention: normal. Concentration: normal.   Speech: speech is normal   Level of consciousness: alert    Cranial Nerves   Cranial nerves II through XII intact.     CN III, IV, VI   Pupils are equal, round, and reactive to light.    Motor Exam   Muscle bulk: normal  Overall muscle tone: normal    Strength   Strength 5/5 except as noted.     Sensory Exam   Light touch normal.     Gait, Coordination, and Reflexes     Gait  Gait: normal    Coordination   Romberg: negative    Reflexes   Reflexes 2+ except as noted.   Right brachioradialis: 2+  Left brachioradialis: 2+  Right biceps: 2+  Left biceps: 2+  Right triceps: 2+  Left triceps: 2+  Right patellar: 2+  Left patellar: 2+  Right achilles: 2+  Left achilles: 2+      Assessment & Plan   Independent Review of Radiographic Studies:       I personally reviewed the images from the following studies.    MR: MRI of the brain w/wo contrast was reviewed and shows 22 x 23 x 26 mm large pituitary macroadenoma causing significant compression of the pituitary gland towards the patient's right of the sella.  The infundibulum follows this trajectory towards the right.  There is some extrasellar extension into the cavernous sinus on the left side but does not cross the carotid line.  Elevated prolactin level at 198    Assessment/Plan: Martinez has a large macroadenoma causing significant compression of his pituitary gland and has extrasellar extension.  He currently does not have any optic chiasm compression.  Based on his prolactin level, it may be stalk effect rather than a true prolactinoma but I would like him to see an endocrinologist and see if it is possible to treat medically.  If not, I would recommend surgical resection.    Medical Decision Making:      CTA head with contrast [facility] protocol  Referral to endocrinology         Diagnoses and all orders for this visit:    1. Pituitary macroadenoma with extrasellar extension (Primary)  -     CT Angiogram Head With Contrast; Future  -     Ambulatory Referral to Endocrinology             Patient Instructions/Recommendations:    Follow-up in 6 weeks after referral to endocrinology      Juan Frank MD  06/21/24  09:09 EDT

## 2024-06-21 ENCOUNTER — OFFICE VISIT (OUTPATIENT)
Dept: NEUROSURGERY | Facility: CLINIC | Age: 34
End: 2024-06-21
Payer: COMMERCIAL

## 2024-06-21 ENCOUNTER — TELEPHONE (OUTPATIENT)
Dept: NEUROSURGERY | Facility: CLINIC | Age: 34
End: 2024-06-21

## 2024-06-21 ENCOUNTER — PATIENT ROUNDING (BHMG ONLY) (OUTPATIENT)
Dept: NEUROSURGERY | Facility: CLINIC | Age: 34
End: 2024-06-21
Payer: COMMERCIAL

## 2024-06-21 VITALS
HEIGHT: 74 IN | WEIGHT: 315 LBS | TEMPERATURE: 98.1 F | BODY MASS INDEX: 40.43 KG/M2 | SYSTOLIC BLOOD PRESSURE: 128 MMHG | OXYGEN SATURATION: 97 % | HEART RATE: 96 BPM | RESPIRATION RATE: 16 BRPM | DIASTOLIC BLOOD PRESSURE: 68 MMHG

## 2024-06-21 DIAGNOSIS — D35.2 PITUITARY MACROADENOMA WITH EXTRASELLAR EXTENSION: Primary | ICD-10-CM

## 2024-06-21 NOTE — TELEPHONE ENCOUNTER
Called patient. Scheduled 6 week f/u per  for 08/1/2024. Sent referrals to correct place. Gave patient information for endocrinologist and for CTA. Informed patient if he is not able to get in before his f/u to call us back to reschedule.

## 2024-06-28 ENCOUNTER — HOSPITAL ENCOUNTER (OUTPATIENT)
Dept: CT IMAGING | Facility: HOSPITAL | Age: 34
Discharge: HOME | End: 2024-06-28
Admitting: NEUROLOGICAL SURGERY
Payer: COMMERCIAL

## 2024-06-28 DIAGNOSIS — D35.2 PITUITARY MACROADENOMA WITH EXTRASELLAR EXTENSION: ICD-10-CM

## 2024-06-28 LAB — CREAT BLDA-MCNC: 0.7 MG/DL (ref 0.6–1.3)

## 2024-06-28 PROCEDURE — 25510000001 IOPAMIDOL PER 1 ML: Performed by: NEUROLOGICAL SURGERY

## 2024-06-28 PROCEDURE — 82565 ASSAY OF CREATININE: CPT

## 2024-06-28 PROCEDURE — 70496 CT ANGIOGRAPHY HEAD: CPT

## 2024-06-28 RX ADMIN — IOPAMIDOL 95 ML: 755 INJECTION, SOLUTION INTRAVENOUS at 08:13

## 2024-07-18 ENCOUNTER — OFFICE VISIT (OUTPATIENT)
Dept: PODIATRY | Facility: CLINIC | Age: 34
End: 2024-07-18
Payer: COMMERCIAL

## 2024-07-18 VITALS
HEIGHT: 74 IN | BODY MASS INDEX: 40.43 KG/M2 | WEIGHT: 315 LBS | HEART RATE: 84 BPM | RESPIRATION RATE: 20 BRPM | OXYGEN SATURATION: 95 %

## 2024-07-18 DIAGNOSIS — E66.01 MORBID OBESITY WITH BMI OF 60.0-69.9, ADULT: ICD-10-CM

## 2024-07-18 DIAGNOSIS — M79.672 LEFT FOOT PAIN: Primary | ICD-10-CM

## 2024-07-18 DIAGNOSIS — M72.2 PLANTAR FASCIITIS, LEFT: ICD-10-CM

## 2024-07-18 PROCEDURE — 99213 OFFICE O/P EST LOW 20 MIN: CPT | Performed by: PODIATRIST

## 2024-07-22 NOTE — PROGRESS NOTES
"07/18/2024  Foot and Ankle Surgery - Established Patient/Follow-up  Provider: Dr. Savage Berman DPM  Location: [facility]    Subjective:  Johnny Duran is a 34 y.o. male.     Chief Complaint   Patient presents with    Left Foot - Follow-up, Pain    Follow-up     JATINLata Vazquez last pcp visit 05/14/2024       History of Present Illness  The patient presents for evaluation of multiple medical concerns.    The patient received an injection three months prior and reports an improvement in his condition. He maintains supportive footwear with inserts and tennis shoes, however, he has not been engaging in any prescribed exercises.      Allergies   Allergen Reactions    Codeine Hives    Bee Venom Swelling       Current Outpatient Medications on File Prior to Visit   Medication Sig Dispense Refill    meclizine (ANTIVERT) 25 MG tablet Take 1 tablet by mouth Daily As Needed for Dizziness.       No current facility-administered medications on file prior to visit.       Objective   Pulse 84   Resp 20   Ht 188 cm (74\")   Wt (!) 240 kg (529 lb)   SpO2 95%   BMI 67.92 kg/m²     Foot/Ankle Exam  Physical Exam  General:   Appearance: appears stated age and healthy and obesity    Orientation: AAOx3    Affect: appropriate    Gait: antalgic       VASCULAR       Right Foot Vascularity   Normal vascular exam    Dorsalis pedis:  2+  Posterior tibial:  2+  Skin Temperature: warm    Edema Grading:  None  CFT:  < 3 seconds  Pedal Hair Growth:  Present  Varicosities: none        NEUROLOGIC      Right Foot Neurologic   Light touch sensation:  Normal  Hot/Cold sensation: normal    Achilles reflex:  2+      MUSCULOSKELETAL       Right Foot Musculoskeletal   Ecchymosis:  None  Tenderness: plantar fascia and plantar heel    Arch:  Normal      MUSCLE STRENGTH      Right Foot Muscle Strength   Normal strength    Foot dorsiflexion:  5  Foot plantar flexion:  5  Foot inversion:  5  Foot eversion:  5      DERMATOLOGIC      Right Foot " Dermatologic   Skin: skin intact        Right Foot Additional Comments Discomfort with palpation involving the plantar medial calcaneal tuberosity region. No progressive deformity or instability.     01/26/2023  Substantially less pain, with palpation involving the heel.     4/18/24: Prominent discomfort involving the plantar medial calcaneal tuberosity of the left lower extremity.  No progressive deformity or instability moderate equinus contracture with knee extended and flexed.      Results      Assessment & Plan   Diagnoses and all orders for this visit:    1. Left foot pain (Primary)    2. Plantar fasciitis, left    3. Morbid obesity with BMI of 60.0-69.9, adult      Assessment & Plan    The patient is advised to persist with stretching exercises, for which handouts will be provided. Additionally, the use of a tennis ball or frozen water bottle is recommended to alleviate scar tissue formation.  I do feel the patient needs to proceed with weight loss.  I have asked that he discuss with bariatric surgery office.  Patient states that he was having insurance issues.  I have asked that he continue to observe and call with any progressive issues or concerns.  I will see him in 2 months time.Reviewed proper basic stretching and manual therapy exercises along with appropriate shoes and activity.  Discussed proper use and/or avoidance of OTC anti-inflammatories.  Patient is to call with any additional issues or concerns.  Greater than 20 minutes was spent before, during, and after evaluation for patient care.             Patient or patient representative verbalized consent for the use of Ambient Listening during the visit with  CASEY Berman DPM for chart documentation. 7/22/2024  06:47 EDT    CASEY Berman DPM

## 2024-07-24 ENCOUNTER — OFFICE VISIT (OUTPATIENT)
Dept: ENDOCRINOLOGY | Age: 34
End: 2024-07-24
Payer: COMMERCIAL

## 2024-07-24 VITALS
BODY MASS INDEX: 40.43 KG/M2 | DIASTOLIC BLOOD PRESSURE: 82 MMHG | WEIGHT: 315 LBS | SYSTOLIC BLOOD PRESSURE: 134 MMHG | HEIGHT: 74 IN | OXYGEN SATURATION: 96 % | HEART RATE: 84 BPM

## 2024-07-24 DIAGNOSIS — D35.2 PITUITARY MACROADENOMA WITH EXTRASELLAR EXTENSION: Primary | ICD-10-CM

## 2024-07-24 RX ORDER — DEXAMETHASONE 1 MG
1 TABLET ORAL ONCE
Qty: 1 TABLET | Refills: 0 | Status: SHIPPED | OUTPATIENT
Start: 2024-07-24 | End: 2024-07-24

## 2024-07-24 NOTE — PROGRESS NOTES
Referring provider: Juan RIVERA MD     Chief complaint/Reason for consult: Pituitary macroadenoma    HPI:   - 34 year old male here for pituitary macroadenoma  - He presented to an ER in May with dizziness and CT showed a pituitary mass  - MRI in 6/2024 showed a 22 x 26 mm adenoma with  invasion of both cavernous sinuses, greater on the left including encasement of the ICA which remains patent. Suprasellar extent is noted   anteriorly, with the optic chiasm not specifically compressed  - He denies any symptoms at this time    The following portions of the patient's history were reviewed and updated as appropriate: allergies, current medications, past family history, past medical history, past social history, past surgical history, and problem list.      Objective     Vitals:    07/24/24 0757   BP: 134/82   Pulse: 84   SpO2: 96%        Physical Exam  Vitals reviewed.   Constitutional:       Appearance: Normal appearance. He is obese.   HENT:      Head: Normocephalic and atraumatic.   Eyes:      General: No scleral icterus.  Neurological:      Mental Status: He is alert.      Gait: Gait normal.   Psychiatric:         Mood and Affect: Mood normal.         Behavior: Behavior normal.         Thought Content: Thought content normal.         Judgment: Judgment normal.         Assessment & Plan   Pituitary macroadenoma  - His hormonal testing so far shows low LH and FSH and elevated prolactin which is likely from stalk effect  - I do not believe he has a prolactinoma because I would expect his prolactin to be higher given the size of the tumor if he had a prolactinoma  - I will order 1 mg DST to rule out Cushings and testosterone levels  - He is scheduled to follow-up with neurosurgery to discuss resection  - His follow-up will be determined by his lab results and if he has a resection I will see him back 6-8 weeks after that to stacy cess his pituitary hormones

## 2024-07-25 DIAGNOSIS — D35.2 PITUITARY MACROADENOMA WITH EXTRASELLAR EXTENSION: ICD-10-CM

## 2024-07-26 ENCOUNTER — PATIENT ROUNDING (BHMG ONLY) (OUTPATIENT)
Dept: ENDOCRINOLOGY | Age: 34
End: 2024-07-26
Payer: COMMERCIAL

## 2024-07-31 NOTE — PROGRESS NOTES
Patient ID: Johnny Duran is a 34 y.o. male is here today for follow-up for pituitary macroadenoma.    Imaging: CTA of the head performed on 06/28/2024    Subjective     The patient is here in regards to   Chief Complaint   Patient presents with    Brain Tumor    Follow-up       History of Present Illness  Martinez is here after reviewing his labs with endocrinology.  I agree with endocrinology that his prolactin level around 200 is likely stalk effect and not representative of a primary prolactinoma.  Denies any new neurological issues at this time.      While in the room and during my examination of the patient I wore a mask and eye protection.  I washed my hands before and after this patient encounter.  The patient was also wearing a mask.    The following portions of the patient's history were reviewed and updated as appropriate: allergies, current medications, past family history, past medical history, past social history, past surgical history and problem list.    Review of Systems   Constitutional:  Negative for fever.   Eyes:  Negative for visual disturbance.   Gastrointestinal:  Negative for nausea and vomiting.   Musculoskeletal:  Negative for neck pain and neck stiffness.   Neurological:  Positive for headaches. Negative for dizziness, weakness, light-headedness and numbness.   Psychiatric/Behavioral:  Positive for confusion and decreased concentration.         Past Medical History:   Diagnosis Date    Anemia     Encounter for screening for malignant neoplasm of rectum     Gallstones     Nausea and vomiting in adult     Nondisplaced fracture of fifth left metatarsal bone     Nondisplaced longitudinal fracture of right patella, initial encounter for closed fracture     Impression: recommend ortho eval, appt today.    Obesity     Obesity, morbid, BMI 40.0-49.9     Overweight (BMI 25.0-29.9)     Plantar fasciitis     right    Screening for depression     Screening for hyperlipidemia     Stress fracture      Traumatic arthritis of right knee        Allergies   Allergen Reactions    Codeine Hives    Bee Venom Swelling       Family History   Problem Relation Age of Onset    Diabetes Father     Colon cancer Maternal Grandfather     Diabetes Paternal Grandfather        Social History     Socioeconomic History    Marital status: Single   Tobacco Use    Smoking status: Never     Passive exposure: Never    Smokeless tobacco: Never   Vaping Use    Vaping status: Never Used   Substance and Sexual Activity    Alcohol use: Yes     Comment: Drinks beer, Drinks hard liquor. 6 drinks monthly    Drug use: No    Sexual activity: Defer       Past Surgical History:   Procedure Laterality Date    CHOLECYSTECTOMY      ORIF FOOT FRACTURE Left     PLANTAR FASCIA RELEASE Right 10/3/2022    Procedure: ENDOSCOPIC PLANTAR FASCIOTOMY;  Surgeon: CASEY Berman DPM;  Location: [facility] MAIN OR;  Service: Podiatry;  Laterality: Right;         Objective     Vitals:    08/01/24 1001   BP: 118/72   Pulse: 80   Resp: 16   Temp: 97.1 °F (36.2 °C)   SpO2: 96%     Body mass index is 68.4 kg/m².    Physical Exam  Constitutional:       Appearance: Normal appearance.   HENT:      Head: Normocephalic and atraumatic.   Eyes:      Extraocular Movements: Extraocular movements intact.      Conjunctiva/sclera: Conjunctivae normal.      Pupils: Pupils are equal, round, and reactive to light.   Cardiovascular:      Rate and Rhythm: Normal rate and regular rhythm.      Pulses: Normal pulses.   Pulmonary:      Breath sounds: Normal breath sounds.   Abdominal:      Palpations: Abdomen is soft.   Musculoskeletal:         General: Normal range of motion.      Cervical back: Normal range of motion and neck supple.   Skin:     General: Skin is warm and dry.   Neurological:      Mental Status: He is alert and oriented to person, place, and time.      Cranial Nerves: Cranial nerves 2-12 are intact.      Motor: Motor function is intact. No weakness or atrophy.       Coordination: Coordination is intact. Romberg sign negative. Romberg Test normal.      Gait: Gait is intact. Gait normal.      Deep Tendon Reflexes: Reflexes are normal and symmetric.      Reflex Scores:       Tricep reflexes are 2+ on the right side and 2+ on the left side.       Bicep reflexes are 2+ on the right side and 2+ on the left side.       Brachioradialis reflexes are 2+ on the right side and 2+ on the left side.       Patellar reflexes are 2+ on the right side and 2+ on the left side.       Achilles reflexes are 2+ on the right side and 2+ on the left side.  Psychiatric:         Speech: Speech normal.         Neurologic Exam     Mental Status   Oriented to person, place, and time.   Attention: normal. Concentration: normal.   Speech: speech is normal   Level of consciousness: alert    Cranial Nerves   Cranial nerves II through XII intact.     CN III, IV, VI   Pupils are equal, round, and reactive to light.    Motor Exam   Muscle bulk: normal  Overall muscle tone: normal    Strength   Strength 5/5 except as noted.     Sensory Exam   Light touch normal.     Gait, Coordination, and Reflexes     Gait  Gait: normal    Coordination   Romberg: negative    Reflexes   Reflexes 2+ except as noted.   Right brachioradialis: 2+  Left brachioradialis: 2+  Right biceps: 2+  Left biceps: 2+  Right triceps: 2+  Left triceps: 2+  Right patellar: 2+  Left patellar: 2+  Right achilles: 2+  Left achilles: 2+      Assessment & Plan   Independent Review of Radiographic Studies:      I personally reviewed the images from the following studies.    CT: CTA of the head and neck was reviewed and shows adequate representation of the carotid arteries for presurgical purposes    Assessment/Plan: Given his large pituitary adenoma, recommended transsphenoidal resection of his adenoma with preservation of his pituitary gland.  We discussed the risk and benefits and alternatives of surgery including CSF leak, injury to the carotid  arteries, injury to the optic chiasm.  He understands as well to proceed with surgery.    Medical Decision Making:      Transnasal transsphenoidal resection of pituitary adenoma         Diagnoses and all orders for this visit:    1. Pituitary macroadenoma with extrasellar extension (Primary)  -     Case Request; Standing  -     CBC & Differential; Future  -     Comprehensive Metabolic Panel; Future  -     aPTT; Future  -     Protime-INR; Future  -     Type & Screen; Future  -     ceFAZolin (ANCEF) 3,000 mg in sodium chloride 0.9 % 100 mL IVPB  -     Case Request    Other orders  -     Inpatient Admission; Standing  -     Follow Anesthesia Guidelines / Protocol; Future  -     Follow Anesthesia Guidelines / Protocol; Standing  -     Verify / Perform Chlorhexidine Skin Prep; Standing  -     Provide Patient With Instructions on NPO Status; Future  -     Provide Chlorhexidine Skin Prep Wipes and Instructions; Future             Patient Instructions/Recommendations:    Call with any questions or concerns      Juan Frank MD  08/01/24  10:25 EDT

## 2024-08-01 ENCOUNTER — OFFICE VISIT (OUTPATIENT)
Dept: NEUROSURGERY | Facility: CLINIC | Age: 34
End: 2024-08-01
Payer: COMMERCIAL

## 2024-08-01 VITALS
RESPIRATION RATE: 16 BRPM | BODY MASS INDEX: 40.43 KG/M2 | WEIGHT: 315 LBS | SYSTOLIC BLOOD PRESSURE: 118 MMHG | HEIGHT: 74 IN | TEMPERATURE: 97.1 F | HEART RATE: 80 BPM | DIASTOLIC BLOOD PRESSURE: 72 MMHG | OXYGEN SATURATION: 96 %

## 2024-08-01 DIAGNOSIS — D35.2 PITUITARY MACROADENOMA WITH EXTRASELLAR EXTENSION: Primary | ICD-10-CM

## 2024-08-04 LAB
CORTIS SERPL-MCNC: 0.8 UG/DL (ref 6.2–19.4)
DEXAMETHASONE SERPL-MCNC: 144 NG/DL
HCT VFR BLD AUTO: 36.7 % (ref 37.5–51)
SHBG SERPL-SCNC: 35.2 NMOL/L (ref 16.5–55.9)
TESTOST SERPL-MCNC: 9 NG/DL (ref 264–916)

## 2024-08-05 ENCOUNTER — TELEPHONE (OUTPATIENT)
Dept: NEUROSURGERY | Facility: CLINIC | Age: 34
End: 2024-08-05
Payer: COMMERCIAL

## 2024-08-05 NOTE — TELEPHONE ENCOUNTER
Patient called with several questions on his upcoming surgery. He mention he thought he had to meet with another doctor and was not sure who it was. Patient said he was suppose to here back from our office and has not heard anything.    He also had pre surgery questions.    Best  call back number is [phone]

## 2024-08-06 NOTE — TELEPHONE ENCOUNTER
Called patient. Informed patient that I do not have a date for surgery due to   not in office currently. Currently  out on vacation. Patient asked if he should do labs. I stated no, labs are to be done the week before surgery in the pre-admission appt. At this current time informed patient he does not have anything to do for surgery yet.

## 2024-08-12 ENCOUNTER — TELEPHONE (OUTPATIENT)
Dept: NEUROSURGERY | Facility: CLINIC | Age: 34
End: 2024-08-12
Payer: COMMERCIAL

## 2024-08-12 NOTE — TELEPHONE ENCOUNTER
Patient called wanting to speak with Dr. Dominguez surgery scheduler    Best call back number [phone]

## 2024-08-13 NOTE — TELEPHONE ENCOUNTER
Called patient back. Patient is anxious for surgery. I am trying to find a date that will work with  schedule.  is going to go on vacation until after labor day. He does not have Mondays available and not many Tuesdays. Informed patient that we are still trying to coordinate a time slot. Once we find a time slot, I will contact him.

## 2024-08-14 ENCOUNTER — TELEPHONE (OUTPATIENT)
Dept: NEUROSURGERY | Facility: CLINIC | Age: 34
End: 2024-08-14

## 2024-08-14 NOTE — TELEPHONE ENCOUNTER
Received disability forms via incoming fax today requesting Dr. Frank to fill them out and fax back, the forms will be in 's folder at front office, thanks!

## 2024-08-19 ENCOUNTER — TELEPHONE (OUTPATIENT)
Dept: NEUROSURGERY | Facility: CLINIC | Age: 34
End: 2024-08-19

## 2024-08-22 ENCOUNTER — TELEPHONE (OUTPATIENT)
Dept: NEUROSURGERY | Facility: CLINIC | Age: 34
End: 2024-08-22
Payer: COMMERCIAL

## 2024-08-22 NOTE — TELEPHONE ENCOUNTER
Called patient and left VM. Informed patient that I have received an email from Dr. Murillo , that Dr. Murillo is unable to do the joint surgery with  on 09/03/2024 due to his schedule conflicts. Informed patient that the next availability will be on 10/01/2024 with  and . This day will work out for both doctors. Asked patient to call back, so we can reschedule all his appointments with surgery.

## 2024-08-28 ENCOUNTER — TELEPHONE (OUTPATIENT)
Dept: NEUROSURGERY | Facility: CLINIC | Age: 34
End: 2024-08-28

## 2024-08-29 NOTE — TELEPHONE ENCOUNTER
Called patient this morning. He stated he has  a lot going on in October and would like to know if  had November dates. Informed patient I will message 's office to see if there are November dates. Sent message to   about moving his surgery to November. Waiting on response before calling patient back.

## 2024-09-04 ENCOUNTER — TELEPHONE (OUTPATIENT)
Dept: NEUROSURGERY | Facility: CLINIC | Age: 34
End: 2024-09-04

## 2024-09-04 NOTE — TELEPHONE ENCOUNTER
Received incoming fax from [employer] requesting disability forms to be filled out and faxed back. The forms will be in Dr. Frank's folder for review, thanks!

## 2024-09-25 ENCOUNTER — TELEPHONE (OUTPATIENT)
Dept: NEUROSURGERY | Facility: CLINIC | Age: 34
End: 2024-09-25
Payer: COMMERCIAL

## 2024-10-21 NOTE — PROGRESS NOTES
Patient ID: Johnny Duran is a 34 y.o. male is here today for follow-up to discuss having surgery for his pituitary macroadenoma with extra-sellar extension.    Subjective     The patient is here in regards to   Chief Complaint   Patient presents with    Follow-up    Brain Tumor       History of Present Illness  Martinez is here for his preoperative appointment.  Currently no new neurological symptoms aside from headaches and decreased concentration.      While in the room and during my examination of the patient I wore a mask and eye protection.  I washed my hands before and after this patient encounter.  The patient was also wearing a mask.    The following portions of the patient's history were reviewed and updated as appropriate: allergies, current medications, past family history, past medical history, past social history, past surgical history and problem list.    Review of Systems   Eyes:  Negative for visual disturbance.   Gastrointestinal:  Positive for nausea. Negative for vomiting.   Musculoskeletal:  Negative for neck pain and neck stiffness.   Neurological:  Positive for headaches. Negative for dizziness, weakness, light-headedness and numbness.   Psychiatric/Behavioral:  Positive for decreased concentration. Negative for confusion.         Past Medical History:   Diagnosis Date    Anemia     Encounter for screening for malignant neoplasm of rectum     Gallstones     Nausea and vomiting in adult     Nondisplaced fracture of fifth left metatarsal bone     Nondisplaced longitudinal fracture of right patella, initial encounter for closed fracture     Impression: recommend ortho eval, appt today.    Obesity     Obesity, morbid, BMI 40.0-49.9     Overweight (BMI 25.0-29.9)     Plantar fasciitis     right    Screening for depression     Screening for hyperlipidemia     Stress fracture     Traumatic arthritis of right knee        Allergies   Allergen Reactions    Codeine Hives    Bee Venom Swelling        Family History   Problem Relation Age of Onset    Diabetes Father     Colon cancer Maternal Grandfather     Diabetes Paternal Grandfather        Social History     Socioeconomic History    Marital status: Single   Tobacco Use    Smoking status: Never     Passive exposure: Never    Smokeless tobacco: Never   Vaping Use    Vaping status: Never Used   Substance and Sexual Activity    Alcohol use: Yes     Comment: Drinks beer, Drinks hard liquor. 6 drinks monthly    Drug use: No    Sexual activity: Defer       Past Surgical History:   Procedure Laterality Date    CHOLECYSTECTOMY      ORIF FOOT FRACTURE Left     PLANTAR FASCIA RELEASE Right 10/3/2022    Procedure: ENDOSCOPIC PLANTAR FASCIOTOMY;  Surgeon: CASEY Berman DPM;  Location: [facility] MAIN OR;  Service: Podiatry;  Laterality: Right;         Objective     Vitals:    10/25/24 1117   BP: 134/78   Pulse: 76   Resp: 16   Temp: 97.1 °F (36.2 °C)   SpO2: 95%     Body mass index is 67.95 kg/m².    Physical Exam  Constitutional:       General: He is awake.      Appearance: Normal appearance.   HENT:      Head: Normocephalic and atraumatic.   Eyes:      General: Lids are normal.      Extraocular Movements: Extraocular movements intact.      Conjunctiva/sclera: Conjunctivae normal.      Pupils: Pupils are equal, round, and reactive to light.   Cardiovascular:      Rate and Rhythm: Normal rate and regular rhythm.      Pulses: Normal pulses.   Pulmonary:      Breath sounds: Normal breath sounds.   Abdominal:      Palpations: Abdomen is soft.   Musculoskeletal:         General: Normal range of motion.      Cervical back: Normal range of motion and neck supple.   Skin:     General: Skin is warm and dry.   Neurological:      Mental Status: He is alert and oriented to person, place, and time.      Motor: Motor function is intact. No weakness or atrophy.      Coordination: Coordination is intact. Romberg sign negative.      Gait: Gait is intact. Gait normal.      Deep  Tendon Reflexes: Reflexes are normal and symmetric.      Reflex Scores:       Tricep reflexes are 2+ on the right side and 2+ on the left side.       Bicep reflexes are 2+ on the right side and 2+ on the left side.       Brachioradialis reflexes are 2+ on the right side and 2+ on the left side.       Patellar reflexes are 2+ on the right side and 2+ on the left side.       Achilles reflexes are 2+ on the right side and 2+ on the left side.        Neurological Exam  Mental Status  Awake and alert. Oriented to person, place and time. Oriented to person, place, and time.    Cranial Nerves  CN II: Visual acuity is normal. Visual fields full to confrontation.  CN III, IV, VI: Extraocular movements intact bilaterally. Normal lids and orbits bilaterally. Pupils equal round and reactive to light bilaterally.  CN V: Facial sensation is normal.  CN VII: Full and symmetric facial movement.  CN IX, X: Palate elevates symmetrically. Normal gag reflex.  CN XI: Shoulder shrug strength is normal.  CN XII: Tongue midline without atrophy or fasciculations.    Motor                                               Right                     Left  Deltoid                                   5                          5   Biceps                                   5                          5   Iliopsoas                               5                          5   Quadriceps                           5                          5   Hamstring                             5                          5   Gastrocnemius                     5                           5   Anterior tibialis                      5                          5    Sensory  Sensation is intact to light touch, pinprick, vibration and proprioception in all four extremities.    Reflexes  Deep tendon reflexes are 2+ and symmetric in all four extremities.                                            Right                      Left  Brachioradialis                    2+                          2+  Biceps                                 2+                         2+  Triceps                                2+                         2+  Patellar                                2+                         2+  Achilles                                2+                         2+    Coordination    Finger-to-nose, rapid alternating movements and heel-to-shin normal bilaterally without dysmetria.    Gait   Normal gait. Normal gait. Romberg is absent.      Assessment & Plan   Independent Review of Radiographic Studies:      I personally reviewed the images from the following studies.        FINDINGS:    CT: CTA of the head and neck was reviewed and shows normal [place] anatomy without aneurysm.  This imaging was performed for preoperative planning.    Assessment/Plan: We discussed the risk and benefits and alternatives of surgery including CSF leak, diabetes insipidus, injury to the optic nerves and carotid arteries.  He understands is willing to proceed with surgery.    I also ensured that we have a OR bed that is capable of taking his weight which is approximately 530 pounds.    Medical Decision Making:      Transnasal transsphenoidal resection of pituitary adenoma         Diagnoses and all orders for this visit:    1. Pituitary macroadenoma with extrasellar extension (Primary)             Patient Instructions/Recommendations:    Call with any questions or concerns      Juan Frank MD  10/25/24  11:32 EDT

## 2024-10-21 NOTE — H&P (VIEW-ONLY)
Patient ID: Johnny Duran is a 34 y.o. male is here today for follow-up to discuss having surgery for his pituitary macroadenoma with extra-sellar extension.    Subjective     The patient is here in regards to   Chief Complaint   Patient presents with    Follow-up    Brain Tumor       History of Present Illness  Martinez is here for his preoperative appointment.  Currently no new neurological symptoms aside from headaches and decreased concentration.      While in the room and during my examination of the patient I wore a mask and eye protection.  I washed my hands before and after this patient encounter.  The patient was also wearing a mask.    The following portions of the patient's history were reviewed and updated as appropriate: allergies, current medications, past family history, past medical history, past social history, past surgical history and problem list.    Review of Systems   Eyes:  Negative for visual disturbance.   Gastrointestinal:  Positive for nausea. Negative for vomiting.   Musculoskeletal:  Negative for neck pain and neck stiffness.   Neurological:  Positive for headaches. Negative for dizziness, weakness, light-headedness and numbness.   Psychiatric/Behavioral:  Positive for decreased concentration. Negative for confusion.         Past Medical History:   Diagnosis Date    Anemia     Encounter for screening for malignant neoplasm of rectum     Gallstones     Nausea and vomiting in adult     Nondisplaced fracture of fifth left metatarsal bone     Nondisplaced longitudinal fracture of right patella, initial encounter for closed fracture     Impression: recommend ortho eval, appt today.    Obesity     Obesity, morbid, BMI 40.0-49.9     Overweight (BMI 25.0-29.9)     Plantar fasciitis     right    Screening for depression     Screening for hyperlipidemia     Stress fracture     Traumatic arthritis of right knee        Allergies   Allergen Reactions    Codeine Hives    Bee Venom Swelling        Family History   Problem Relation Age of Onset    Diabetes Father     Colon cancer Maternal Grandfather     Diabetes Paternal Grandfather        Social History     Socioeconomic History    Marital status: Single   Tobacco Use    Smoking status: Never     Passive exposure: Never    Smokeless tobacco: Never   Vaping Use    Vaping status: Never Used   Substance and Sexual Activity    Alcohol use: Yes     Comment: Drinks beer, Drinks hard liquor. 6 drinks monthly    Drug use: No    Sexual activity: Defer       Past Surgical History:   Procedure Laterality Date    CHOLECYSTECTOMY      ORIF FOOT FRACTURE Left     PLANTAR FASCIA RELEASE Right 10/3/2022    Procedure: ENDOSCOPIC PLANTAR FASCIOTOMY;  Surgeon: CASEY Berman DPM;  Location: [facility] MAIN OR;  Service: Podiatry;  Laterality: Right;         Objective     Vitals:    10/25/24 1117   BP: 134/78   Pulse: 76   Resp: 16   Temp: 97.1 °F (36.2 °C)   SpO2: 95%     Body mass index is 67.95 kg/m².    Physical Exam  Constitutional:       General: He is awake.      Appearance: Normal appearance.   HENT:      Head: Normocephalic and atraumatic.   Eyes:      General: Lids are normal.      Extraocular Movements: Extraocular movements intact.      Conjunctiva/sclera: Conjunctivae normal.      Pupils: Pupils are equal, round, and reactive to light.   Cardiovascular:      Rate and Rhythm: Normal rate and regular rhythm.      Pulses: Normal pulses.   Pulmonary:      Breath sounds: Normal breath sounds.   Abdominal:      Palpations: Abdomen is soft.   Musculoskeletal:         General: Normal range of motion.      Cervical back: Normal range of motion and neck supple.   Skin:     General: Skin is warm and dry.   Neurological:      Mental Status: He is alert and oriented to person, place, and time.      Motor: Motor function is intact. No weakness or atrophy.      Coordination: Coordination is intact. Romberg sign negative.      Gait: Gait is intact. Gait normal.      Deep  Tendon Reflexes: Reflexes are normal and symmetric.      Reflex Scores:       Tricep reflexes are 2+ on the right side and 2+ on the left side.       Bicep reflexes are 2+ on the right side and 2+ on the left side.       Brachioradialis reflexes are 2+ on the right side and 2+ on the left side.       Patellar reflexes are 2+ on the right side and 2+ on the left side.       Achilles reflexes are 2+ on the right side and 2+ on the left side.        Neurological Exam  Mental Status  Awake and alert. Oriented to person, place and time. Oriented to person, place, and time.    Cranial Nerves  CN II: Visual acuity is normal. Visual fields full to confrontation.  CN III, IV, VI: Extraocular movements intact bilaterally. Normal lids and orbits bilaterally. Pupils equal round and reactive to light bilaterally.  CN V: Facial sensation is normal.  CN VII: Full and symmetric facial movement.  CN IX, X: Palate elevates symmetrically. Normal gag reflex.  CN XI: Shoulder shrug strength is normal.  CN XII: Tongue midline without atrophy or fasciculations.    Motor                                               Right                     Left  Deltoid                                   5                          5   Biceps                                   5                          5   Iliopsoas                               5                          5   Quadriceps                           5                          5   Hamstring                             5                          5   Gastrocnemius                     5                           5   Anterior tibialis                      5                          5    Sensory  Sensation is intact to light touch, pinprick, vibration and proprioception in all four extremities.    Reflexes  Deep tendon reflexes are 2+ and symmetric in all four extremities.                                            Right                      Left  Brachioradialis                    2+                          2+  Biceps                                 2+                         2+  Triceps                                2+                         2+  Patellar                                2+                         2+  Achilles                                2+                         2+    Coordination    Finger-to-nose, rapid alternating movements and heel-to-shin normal bilaterally without dysmetria.    Gait   Normal gait. Normal gait. Romberg is absent.      Assessment & Plan   Independent Review of Radiographic Studies:      I personally reviewed the images from the following studies.        FINDINGS:    CT: CTA of the head and neck was reviewed and shows normal [place] anatomy without aneurysm.  This imaging was performed for preoperative planning.    Assessment/Plan: We discussed the risk and benefits and alternatives of surgery including CSF leak, diabetes insipidus, injury to the optic nerves and carotid arteries.  He understands is willing to proceed with surgery.    I also ensured that we have a OR bed that is capable of taking his weight which is approximately 530 pounds.    Medical Decision Making:      Transnasal transsphenoidal resection of pituitary adenoma         Diagnoses and all orders for this visit:    1. Pituitary macroadenoma with extrasellar extension (Primary)             Patient Instructions/Recommendations:    Call with any questions or concerns      Juan Frank MD  10/25/24  11:32 EDT

## 2024-10-25 ENCOUNTER — PRE-ADMISSION TESTING (OUTPATIENT)
Dept: PREADMISSION TESTING | Facility: HOSPITAL | Age: 34
End: 2024-10-25
Payer: COMMERCIAL

## 2024-10-25 ENCOUNTER — OFFICE VISIT (OUTPATIENT)
Dept: NEUROSURGERY | Facility: CLINIC | Age: 34
End: 2024-10-25
Payer: COMMERCIAL

## 2024-10-25 VITALS
WEIGHT: 315 LBS | SYSTOLIC BLOOD PRESSURE: 134 MMHG | BODY MASS INDEX: 40.43 KG/M2 | OXYGEN SATURATION: 95 % | TEMPERATURE: 97.1 F | HEART RATE: 76 BPM | DIASTOLIC BLOOD PRESSURE: 78 MMHG | RESPIRATION RATE: 16 BRPM | HEIGHT: 74 IN

## 2024-10-25 VITALS
WEIGHT: 315 LBS | HEART RATE: 75 BPM | TEMPERATURE: 97.4 F | BODY MASS INDEX: 40.43 KG/M2 | HEIGHT: 74 IN | OXYGEN SATURATION: 96 % | RESPIRATION RATE: 20 BRPM | DIASTOLIC BLOOD PRESSURE: 82 MMHG | SYSTOLIC BLOOD PRESSURE: 156 MMHG

## 2024-10-25 DIAGNOSIS — D35.2 PITUITARY MACROADENOMA WITH EXTRASELLAR EXTENSION: Primary | ICD-10-CM

## 2024-10-25 DIAGNOSIS — D35.2 PITUITARY MACROADENOMA WITH EXTRASELLAR EXTENSION: ICD-10-CM

## 2024-10-25 LAB
ABO GROUP BLD: NORMAL
ALBUMIN SERPL-MCNC: 3.7 G/DL (ref 3.5–5.2)
ALBUMIN/GLOB SERPL: 1.1 G/DL
ALP SERPL-CCNC: 112 U/L (ref 39–117)
ALT SERPL W P-5'-P-CCNC: 12 U/L (ref 1–41)
ANION GAP SERPL CALCULATED.3IONS-SCNC: 6.8 MMOL/L (ref 5–15)
APTT PPP: 31.2 SECONDS (ref 22.7–35.4)
AST SERPL-CCNC: 11 U/L (ref 1–40)
BASOPHILS # BLD AUTO: 0.04 10*3/MM3 (ref 0–0.2)
BASOPHILS NFR BLD AUTO: 0.5 % (ref 0–1.5)
BILIRUB SERPL-MCNC: 0.3 MG/DL (ref 0–1.2)
BLD GP AB SCN SERPL QL: NEGATIVE
BUN SERPL-MCNC: 11 MG/DL (ref 6–20)
BUN/CREAT SERPL: 19.6 (ref 7–25)
CALCIUM SPEC-SCNC: 9 MG/DL (ref 8.6–10.5)
CHLORIDE SERPL-SCNC: 104 MMOL/L (ref 98–107)
CO2 SERPL-SCNC: 28.2 MMOL/L (ref 22–29)
CREAT SERPL-MCNC: 0.56 MG/DL (ref 0.76–1.27)
DEPRECATED RDW RBC AUTO: 43.7 FL (ref 37–54)
EGFRCR SERPLBLD CKD-EPI 2021: 132.6 ML/MIN/1.73
EOSINOPHIL # BLD AUTO: 0.18 10*3/MM3 (ref 0–0.4)
EOSINOPHIL NFR BLD AUTO: 2.2 % (ref 0.3–6.2)
ERYTHROCYTE [DISTWIDTH] IN BLOOD BY AUTOMATED COUNT: 13.3 % (ref 12.3–15.4)
GLOBULIN UR ELPH-MCNC: 3.3 GM/DL
GLUCOSE SERPL-MCNC: 97 MG/DL (ref 65–99)
HCT VFR BLD AUTO: 35.3 % (ref 37.5–51)
HGB BLD-MCNC: 11.4 G/DL (ref 13–17.7)
IMM GRANULOCYTES # BLD AUTO: 0.01 10*3/MM3 (ref 0–0.05)
IMM GRANULOCYTES NFR BLD AUTO: 0.1 % (ref 0–0.5)
INR PPP: 0.99 (ref 0.9–1.1)
LYMPHOCYTES # BLD AUTO: 2.45 10*3/MM3 (ref 0.7–3.1)
LYMPHOCYTES NFR BLD AUTO: 29.7 % (ref 19.6–45.3)
MCH RBC QN AUTO: 28.8 PG (ref 26.6–33)
MCHC RBC AUTO-ENTMCNC: 32.3 G/DL (ref 31.5–35.7)
MCV RBC AUTO: 89.1 FL (ref 79–97)
MONOCYTES # BLD AUTO: 0.47 10*3/MM3 (ref 0.1–0.9)
MONOCYTES NFR BLD AUTO: 5.7 % (ref 5–12)
NEUTROPHILS NFR BLD AUTO: 5.09 10*3/MM3 (ref 1.7–7)
NEUTROPHILS NFR BLD AUTO: 61.8 % (ref 42.7–76)
NRBC BLD AUTO-RTO: 0 /100 WBC (ref 0–0.2)
PLATELET # BLD AUTO: 363 10*3/MM3 (ref 140–450)
PMV BLD AUTO: 9.4 FL (ref 6–12)
POTASSIUM SERPL-SCNC: 4.3 MMOL/L (ref 3.5–5.2)
PROT SERPL-MCNC: 7 G/DL (ref 6–8.5)
PROTHROMBIN TIME: 13.3 SECONDS (ref 11.7–14.2)
RBC # BLD AUTO: 3.96 10*6/MM3 (ref 4.14–5.8)
RH BLD: POSITIVE
SODIUM SERPL-SCNC: 139 MMOL/L (ref 136–145)
T&S EXPIRATION DATE: NORMAL
WBC NRBC COR # BLD AUTO: 8.24 10*3/MM3 (ref 3.4–10.8)

## 2024-10-25 PROCEDURE — 93010 ELECTROCARDIOGRAM REPORT: CPT | Performed by: INTERNAL MEDICINE

## 2024-10-25 PROCEDURE — 86901 BLOOD TYPING SEROLOGIC RH(D): CPT

## 2024-10-25 PROCEDURE — 93005 ELECTROCARDIOGRAM TRACING: CPT

## 2024-10-25 PROCEDURE — 85610 PROTHROMBIN TIME: CPT

## 2024-10-25 PROCEDURE — 36415 COLL VENOUS BLD VENIPUNCTURE: CPT

## 2024-10-25 PROCEDURE — 86850 RBC ANTIBODY SCREEN: CPT

## 2024-10-25 PROCEDURE — 86900 BLOOD TYPING SEROLOGIC ABO: CPT

## 2024-10-25 PROCEDURE — 85025 COMPLETE CBC W/AUTO DIFF WBC: CPT

## 2024-10-25 PROCEDURE — 80053 COMPREHEN METABOLIC PANEL: CPT

## 2024-10-25 PROCEDURE — 85730 THROMBOPLASTIN TIME PARTIAL: CPT

## 2024-10-25 NOTE — DISCHARGE INSTRUCTIONS
Take the following medications the morning of surgery with a small sip of water:    NONE    If you are on prescription narcotic pain medication to control your pain you may also take that medication the morning of surgery.    General Instructions:  Do not eat or drink anything after midnight the night before surgery.  Patients who avoid smoking, chewing tobacco and alcohol for 4 weeks prior to surgery have a reduced risk of post-operative complications.  Quit smoking as many days before surgery as you can.  Do not smoke, use chewing tobacco or drink alcohol the day of surgery.   Bring any papers given to you in the doctor’s office.  Wear clean comfortable clothes.  Do not wear contact lenses, false eyelashes or make-up.  Bring a case for your glasses.   Remove all piercings.  Leave jewelry and any other valuables at home.  The Pre-Admission Testing nurse will instruct you to bring medications if unable to obtain an accurate list in Pre-Admission Testing.        If you were given a blood bank ID arm band remember to bring it with you the day of surgery.    Preventing a Surgical Site Infection:  For 2 to 3 days before surgery, avoid shaving with a razor because the razor can irritate skin and make it easier to develop an infection.    Any areas of open skin can increase the risk of a post-operative wound infection by allowing bacteria to enter and travel throughout the body.  Notify your surgeon if you have any skin wounds / rashes even if it is not near the expected surgical site.  The area will need assessed to determine if surgery should be delayed until it is healed.  The night prior to surgery shower using a fresh bar of anti-bacterial soap (such as Dial) and clean washcloth.  Sleep in a clean bed with clean clothing.  Do not allow pets to sleep with you.  Shower on the morning of surgery using a fresh bar of anti-bacterial soap (such as Dial) and clean washcloth.  Dry with a clean towel and dress in clean  clothing.  Ask your surgeon if you will be receiving antibiotics prior to surgery.  Make sure you, your family, and all healthcare providers clean their hands with soap and water or an alcohol based hand  before caring for you or your wound.    Day of surgery:  Your arrival time is approximately two hours before your scheduled surgery time.  Please note if you have an early arrival time the surgery doors do not open before 5:00 AM.  Upon arrival, a Pre-op nurse and Anesthesiologist will review your health history, obtain vital signs, and answer questions you may have.  The only belongings needed at this time will be your home medications and if applicable your C-PAP/BI-PAP machine.  A Pre-op nurse will start an IV and you may receive medication in preparation for surgery, including something to help you relax.      Please be aware that surgery does come with discomfort.  We want to make every effort to control your discomfort so please discuss any uncontrolled symptoms with your nurse.   Your doctor will most likely have prescribed pain medications.      CHLORHEXIDINE CLOTH INSTRUCTIONS  The morning of surgery follow these instructions using the Chlorhexidine cloths you've been given.  These steps reduce bacteria on the body.  Do not use the cloths near your eyes, ears mouth, genitalia or on open wounds.  Throw the cloths away after use but do not try to flush them down a toilet.      Open and remove one cloth at a time from the package.    Leave the cloth unfolded and begin the bathing.  Massage the skin with the cloths using gentle pressure to remove bacteria.  Do not scrub harshly.   Follow the steps below with one 2% CHG cloth per area (6 total cloths).  One cloth for neck, shoulders and chest.  One cloth for both arms, hands, fingers and underarms (do underarms last).  One cloth for the abdomen followed by groin.  One cloth for right leg and foot including between the toes.  One cloth for left leg and  foot including between the toes.  The last cloth is to be used for the back of the neck, back and buttocks.    Allow the CHG to air dry 3 minutes on the skin which will give it time to work and decrease the chance of irritation.  The skin may feel sticky until it is dry.  Do not rinse with water or any other liquid or you will lose the beneficial effects of the CHG.  If mild skin irritation occurs, do rinse the skin to remove the CHG.  Report this to the nurse at time of admission.  Do not apply lotions, creams, ointments, deodorants or perfumes after using the clothes. Dress in clean clothes before coming to the hospital.    If you are staying overnight following surgery, you will be transported to your hospital room following the recovery period.  [facility] has all private rooms.    If you have any questions please call Pre-Admission Testing at [phone].  Deductibles and co-payments are collected on the day of service. Please be prepared to pay the required co-pay, deductible or deposit on the day of service as defined by your plan.    Call your surgeon immediately if you experience any of the following symptoms:  Sore Throat  Shortness of Breath or difficulty breathing  Cough  Chills  Body soreness or muscle pain  Headache  Fever  New loss of taste or smell  Do not arrive for your surgery ill.  Your procedure will need to be rescheduled to another time.  You will need to call your physician before the day of surgery to avoid any unnecessary exposure to hospital staff as well as other patients.

## 2024-10-26 LAB
QT INTERVAL: 389 MS
QTC INTERVAL: 432 MS

## 2024-11-05 ENCOUNTER — ANESTHESIA EVENT (OUTPATIENT)
Dept: PERIOP | Facility: HOSPITAL | Age: 34
End: 2024-11-05
Payer: COMMERCIAL

## 2024-11-05 ENCOUNTER — HOSPITAL ENCOUNTER (INPATIENT)
Facility: HOSPITAL | Age: 34
LOS: 7 days | Discharge: HOME | End: 2024-11-12
Attending: NEUROLOGICAL SURGERY | Admitting: NEUROLOGICAL SURGERY
Payer: COMMERCIAL

## 2024-11-05 ENCOUNTER — ANESTHESIA (OUTPATIENT)
Dept: PERIOP | Facility: HOSPITAL | Age: 34
End: 2024-11-05
Payer: COMMERCIAL

## 2024-11-05 DIAGNOSIS — D35.2 PITUITARY MACROADENOMA WITH EXTRASELLAR EXTENSION: ICD-10-CM

## 2024-11-05 LAB
ALBUMIN SERPL-MCNC: 3.5 G/DL (ref 3.5–5.2)
ALBUMIN/GLOB SERPL: 1.1 G/DL
ALP SERPL-CCNC: 107 U/L (ref 39–117)
ALT SERPL W P-5'-P-CCNC: 14 U/L (ref 1–41)
ANION GAP SERPL CALCULATED.3IONS-SCNC: 11.3 MMOL/L (ref 5–15)
AST SERPL-CCNC: 14 U/L (ref 1–40)
BACTERIA UR QL AUTO: ABNORMAL /HPF
BILIRUB SERPL-MCNC: <0.2 MG/DL (ref 0–1.2)
BILIRUB UR QL STRIP: NEGATIVE
BUN SERPL-MCNC: 9 MG/DL (ref 6–20)
BUN/CREAT SERPL: 12.2 (ref 7–25)
CALCIUM SPEC-SCNC: 8.4 MG/DL (ref 8.6–10.5)
CHLORIDE SERPL-SCNC: 106 MMOL/L (ref 98–107)
CLARITY UR: CLEAR
CO2 SERPL-SCNC: 25.7 MMOL/L (ref 22–29)
COLOR UR: YELLOW
CORTIS SERPL-MCNC: 25.7 MCG/DL
CREAT SERPL-MCNC: 0.74 MG/DL (ref 0.76–1.27)
DEPRECATED RDW RBC AUTO: 41.2 FL (ref 37–54)
EGFRCR SERPLBLD CKD-EPI 2021: 121.9 ML/MIN/1.73
ERYTHROCYTE [DISTWIDTH] IN BLOOD BY AUTOMATED COUNT: 13.1 % (ref 12.3–15.4)
GLOBULIN UR ELPH-MCNC: 3.1 GM/DL
GLUCOSE BLDC GLUCOMTR-MCNC: 164 MG/DL (ref 70–130)
GLUCOSE SERPL-MCNC: 177 MG/DL (ref 65–99)
GLUCOSE UR STRIP-MCNC: NEGATIVE MG/DL
HCT VFR BLD AUTO: 32.6 % (ref 37.5–51)
HGB BLD-MCNC: 10.4 G/DL (ref 13–17.7)
HGB UR QL STRIP.AUTO: ABNORMAL
HYALINE CASTS UR QL AUTO: ABNORMAL /LPF
KETONES UR QL STRIP: NEGATIVE
LEUKOCYTE ESTERASE UR QL STRIP.AUTO: ABNORMAL
MCH RBC QN AUTO: 27.7 PG (ref 26.6–33)
MCHC RBC AUTO-ENTMCNC: 31.9 G/DL (ref 31.5–35.7)
MCV RBC AUTO: 86.9 FL (ref 79–97)
NITRITE UR QL STRIP: NEGATIVE
OSMOLALITY SERPL: 291 MOSM/KG (ref 275–300)
OSMOLALITY SERPL: 293 MOSM/KG (ref 275–300)
PH UR STRIP.AUTO: 6.5 [PH] (ref 5–8)
PHOSPHATE SERPL-MCNC: 2.7 MG/DL (ref 2.5–4.5)
PLATELET # BLD AUTO: 382 10*3/MM3 (ref 140–450)
PMV BLD AUTO: 9.3 FL (ref 6–12)
POTASSIUM SERPL-SCNC: 4 MMOL/L (ref 3.5–5.2)
PROT SERPL-MCNC: 6.6 G/DL (ref 6–8.5)
PROT UR QL STRIP: NEGATIVE
RBC # BLD AUTO: 3.75 10*6/MM3 (ref 4.14–5.8)
RBC # UR STRIP: ABNORMAL /HPF
REF LAB TEST METHOD: ABNORMAL
SODIUM SERPL-SCNC: 138 MMOL/L (ref 136–145)
SODIUM SERPL-SCNC: 140 MMOL/L (ref 136–145)
SODIUM SERPL-SCNC: 143 MMOL/L (ref 136–145)
SP GR UR STRIP: 1.01 (ref 1–1.03)
SP GR UR STRIP: 1.01 (ref 1–1.03)
SQUAMOUS #/AREA URNS HPF: ABNORMAL /HPF
UROBILINOGEN UR QL STRIP: ABNORMAL
WBC # UR STRIP: ABNORMAL /HPF
WBC NRBC COR # BLD AUTO: 8.73 10*3/MM3 (ref 3.4–10.8)

## 2024-11-05 PROCEDURE — 25810000003 SODIUM CHLORIDE 0.9 % SOLUTION: Performed by: NEUROLOGICAL SURGERY

## 2024-11-05 PROCEDURE — 82948 REAGENT STRIP/BLOOD GLUCOSE: CPT

## 2024-11-05 PROCEDURE — 25810000003 LACTATED RINGERS PER 1000 ML: Performed by: ANESTHESIOLOGY

## 2024-11-05 PROCEDURE — 25010000002 CEFAZOLIN PER 500 MG: Performed by: NEUROLOGICAL SURGERY

## 2024-11-05 PROCEDURE — 25010000002 NICARDIPINE 2.5 MG/ML SOLUTION: Performed by: NEUROLOGICAL SURGERY

## 2024-11-05 PROCEDURE — 80053 COMPREHEN METABOLIC PANEL: CPT | Performed by: NEUROLOGICAL SURGERY

## 2024-11-05 PROCEDURE — 25010000002 DEXAMETHASONE SODIUM PHOSPHATE 20 MG/5ML SOLUTION: Performed by: NURSE ANESTHETIST, CERTIFIED REGISTERED

## 2024-11-05 PROCEDURE — 25010000002 LIDOCAINE 2% SOLUTION: Performed by: NURSE ANESTHETIST, CERTIFIED REGISTERED

## 2024-11-05 PROCEDURE — 25010000002 CEFAZOLIN 3 G RECONSTITUTED SOLUTION 1 EACH VIAL: Performed by: NEUROLOGICAL SURGERY

## 2024-11-05 PROCEDURE — 84100 ASSAY OF PHOSPHORUS: CPT | Performed by: NEUROLOGICAL SURGERY

## 2024-11-05 PROCEDURE — 09UX87Z SUPPLEMENT LEFT SPHENOID SINUS WITH AUTOLOGOUS TISSUE SUBSTITUTE, VIA NATURAL OR ARTIFICIAL OPENING ENDOSCOPIC: ICD-10-PCS | Performed by: NEUROLOGICAL SURGERY

## 2024-11-05 PROCEDURE — 61781 SCAN PROC CRANIAL INTRA: CPT | Performed by: NEUROLOGICAL SURGERY

## 2024-11-05 PROCEDURE — 09SM4ZZ REPOSITION NASAL SEPTUM, PERCUTANEOUS ENDOSCOPIC APPROACH: ICD-10-PCS | Performed by: OTOLARYNGOLOGY

## 2024-11-05 PROCEDURE — 82533 TOTAL CORTISOL: CPT | Performed by: NEUROLOGICAL SURGERY

## 2024-11-05 PROCEDURE — 25010000002 ONDANSETRON PER 1 MG: Performed by: NURSE ANESTHETIST, CERTIFIED REGISTERED

## 2024-11-05 PROCEDURE — 25010000002 FENTANYL CITRATE (PF) 50 MCG/ML SOLUTION: Performed by: NURSE ANESTHETIST, CERTIFIED REGISTERED

## 2024-11-05 PROCEDURE — C1725 CATH, TRANSLUMIN NON-LASER: HCPCS | Performed by: NEUROLOGICAL SURGERY

## 2024-11-05 PROCEDURE — 8E09XBZ COMPUTER ASSISTED PROCEDURE OF HEAD AND NECK REGION: ICD-10-PCS | Performed by: NEUROLOGICAL SURGERY

## 2024-11-05 PROCEDURE — 25010000002 HYDROMORPHONE 1 MG/ML SOLUTION: Performed by: NURSE ANESTHETIST, CERTIFIED REGISTERED

## 2024-11-05 PROCEDURE — 25010000002 MIDAZOLAM PER 1 MG: Performed by: ANESTHESIOLOGY

## 2024-11-05 PROCEDURE — 88311 DECALCIFY TISSUE: CPT | Performed by: NEUROLOGICAL SURGERY

## 2024-11-05 PROCEDURE — 25010000002 CEFAZOLIN PER 500 MG: Performed by: NURSE ANESTHETIST, CERTIFIED REGISTERED

## 2024-11-05 PROCEDURE — 25010000002 FENTANYL CITRATE (PF) 50 MCG/ML SOLUTION: Performed by: ANESTHESIOLOGY

## 2024-11-05 PROCEDURE — 81003 URINALYSIS AUTO W/O SCOPE: CPT | Performed by: NEUROLOGICAL SURGERY

## 2024-11-05 PROCEDURE — 81001 URINALYSIS AUTO W/SCOPE: CPT | Performed by: NEUROLOGICAL SURGERY

## 2024-11-05 PROCEDURE — 25010000002 SUGAMMADEX 200 MG/2ML SOLUTION: Performed by: NURSE ANESTHETIST, CERTIFIED REGISTERED

## 2024-11-05 PROCEDURE — 88305 TISSUE EXAM BY PATHOLOGIST: CPT | Performed by: NEUROLOGICAL SURGERY

## 2024-11-05 PROCEDURE — 25810000003 LACTATED RINGERS PER 1000 ML: Performed by: NURSE ANESTHETIST, CERTIFIED REGISTERED

## 2024-11-05 PROCEDURE — 84295 ASSAY OF SERUM SODIUM: CPT | Performed by: NEUROLOGICAL SURGERY

## 2024-11-05 PROCEDURE — 84295 ASSAY OF SERUM SODIUM: CPT | Performed by: NURSE PRACTITIONER

## 2024-11-05 PROCEDURE — 83930 ASSAY OF BLOOD OSMOLALITY: CPT | Performed by: NURSE PRACTITIONER

## 2024-11-05 PROCEDURE — 25010000002 PROPOFOL 200 MG/20ML EMULSION: Performed by: NURSE ANESTHETIST, CERTIFIED REGISTERED

## 2024-11-05 PROCEDURE — 0JBN0ZZ EXCISION OF RIGHT LOWER LEG SUBCUTANEOUS TISSUE AND FASCIA, OPEN APPROACH: ICD-10-PCS | Performed by: OTOLARYNGOLOGY

## 2024-11-05 PROCEDURE — 85027 COMPLETE CBC AUTOMATED: CPT | Performed by: NEUROLOGICAL SURGERY

## 2024-11-05 PROCEDURE — C1889 IMPLANT/INSERT DEVICE, NOC: HCPCS | Performed by: NEUROLOGICAL SURGERY

## 2024-11-05 PROCEDURE — 62165 REMOVE PITUIT TUMOR W/SCOPE: CPT | Performed by: NEUROLOGICAL SURGERY

## 2024-11-05 PROCEDURE — 83930 ASSAY OF BLOOD OSMOLALITY: CPT | Performed by: NEUROLOGICAL SURGERY

## 2024-11-05 PROCEDURE — 25010000002 LIDOCAINE 1% - EPINEPHRINE 1:100000 1 %-1:100000 SOLUTION: Performed by: OTOLARYNGOLOGY

## 2024-11-05 PROCEDURE — 0GB04ZZ EXCISION OF PITUITARY GLAND, PERCUTANEOUS ENDOSCOPIC APPROACH: ICD-10-PCS | Performed by: NEUROLOGICAL SURGERY

## 2024-11-05 DEVICE — FLOSEAL WITH RECOTHROM - 10ML.
Type: IMPLANTABLE DEVICE | Site: NOSE | Status: FUNCTIONAL
Brand: FLOSEAL HEMOSTATIC MATRIX

## 2024-11-05 DEVICE — DURAGEN® PLUS DURAL REGENERATION MATRIX, 2 IN X 2 IN (5 CM X 5 CM)
Type: IMPLANTABLE DEVICE | Site: DURA | Status: FUNCTIONAL
Brand: DURAGEN® PLUS

## 2024-11-05 RX ORDER — ACETAMINOPHEN 325 MG/1
650 TABLET ORAL EVERY 4 HOURS PRN
Status: DISCONTINUED | OUTPATIENT
Start: 2024-11-05 | End: 2024-11-12 | Stop reason: HOSPADM

## 2024-11-05 RX ORDER — ACETAMINOPHEN 650 MG/1
650 SUPPOSITORY RECTAL EVERY 4 HOURS PRN
Status: DISCONTINUED | OUTPATIENT
Start: 2024-11-05 | End: 2024-11-09

## 2024-11-05 RX ORDER — FENTANYL CITRATE 50 UG/ML
INJECTION, SOLUTION INTRAMUSCULAR; INTRAVENOUS AS NEEDED
Status: DISCONTINUED | OUTPATIENT
Start: 2024-11-05 | End: 2024-11-05 | Stop reason: SURG

## 2024-11-05 RX ORDER — MIDAZOLAM HYDROCHLORIDE 1 MG/ML
1 INJECTION, SOLUTION INTRAMUSCULAR; INTRAVENOUS
Status: DISCONTINUED | OUTPATIENT
Start: 2024-11-05 | End: 2024-11-05 | Stop reason: HOSPADM

## 2024-11-05 RX ORDER — ACETAMINOPHEN 160 MG/5ML
650 SOLUTION ORAL EVERY 4 HOURS PRN
Status: DISCONTINUED | OUTPATIENT
Start: 2024-11-05 | End: 2024-11-09

## 2024-11-05 RX ORDER — METOPROLOL TARTRATE 1 MG/ML
INJECTION, SOLUTION INTRAVENOUS AS NEEDED
Status: DISCONTINUED | OUTPATIENT
Start: 2024-11-05 | End: 2024-11-05 | Stop reason: SURG

## 2024-11-05 RX ORDER — POLYETHYLENE GLYCOL 3350 17 G/17G
17 POWDER, FOR SOLUTION ORAL DAILY PRN
Status: DISCONTINUED | OUTPATIENT
Start: 2024-11-05 | End: 2024-11-12 | Stop reason: HOSPADM

## 2024-11-05 RX ORDER — HYDRALAZINE HYDROCHLORIDE 20 MG/ML
5 INJECTION INTRAMUSCULAR; INTRAVENOUS
Status: DISCONTINUED | OUTPATIENT
Start: 2024-11-05 | End: 2024-11-05 | Stop reason: HOSPADM

## 2024-11-05 RX ORDER — SODIUM CHLORIDE, SODIUM LACTATE, POTASSIUM CHLORIDE, CALCIUM CHLORIDE 600; 310; 30; 20 MG/100ML; MG/100ML; MG/100ML; MG/100ML
9 INJECTION, SOLUTION INTRAVENOUS CONTINUOUS
Status: ACTIVE | OUTPATIENT
Start: 2024-11-05 | End: 2024-11-06

## 2024-11-05 RX ORDER — LIDOCAINE HYDROCHLORIDE AND EPINEPHRINE 10; 10 MG/ML; UG/ML
INJECTION, SOLUTION INFILTRATION; PERINEURAL AS NEEDED
Status: DISCONTINUED | OUTPATIENT
Start: 2024-11-05 | End: 2024-11-05 | Stop reason: HOSPADM

## 2024-11-05 RX ORDER — ALUMINA, MAGNESIA, AND SIMETHICONE 2400; 2400; 240 MG/30ML; MG/30ML; MG/30ML
15 SUSPENSION ORAL EVERY 6 HOURS PRN
Status: DISCONTINUED | OUTPATIENT
Start: 2024-11-05 | End: 2024-11-09

## 2024-11-05 RX ORDER — LIDOCAINE HYDROCHLORIDE 10 MG/ML
0.5 INJECTION, SOLUTION INFILTRATION; PERINEURAL ONCE AS NEEDED
Status: DISCONTINUED | OUTPATIENT
Start: 2024-11-05 | End: 2024-11-05 | Stop reason: HOSPADM

## 2024-11-05 RX ORDER — DROPERIDOL 2.5 MG/ML
0.62 INJECTION, SOLUTION INTRAMUSCULAR; INTRAVENOUS
Status: DISCONTINUED | OUTPATIENT
Start: 2024-11-05 | End: 2024-11-05 | Stop reason: HOSPADM

## 2024-11-05 RX ORDER — HYDROMORPHONE HYDROCHLORIDE 1 MG/ML
0.5 INJECTION, SOLUTION INTRAMUSCULAR; INTRAVENOUS; SUBCUTANEOUS
Status: DISCONTINUED | OUTPATIENT
Start: 2024-11-05 | End: 2024-11-05 | Stop reason: HOSPADM

## 2024-11-05 RX ORDER — SODIUM CHLORIDE 0.9 % (FLUSH) 0.9 %
3 SYRINGE (ML) INJECTION EVERY 12 HOURS SCHEDULED
Status: DISCONTINUED | OUTPATIENT
Start: 2024-11-05 | End: 2024-11-05 | Stop reason: HOSPADM

## 2024-11-05 RX ORDER — ONDANSETRON 4 MG/1
4 TABLET, ORALLY DISINTEGRATING ORAL EVERY 6 HOURS PRN
Status: DISCONTINUED | OUTPATIENT
Start: 2024-11-05 | End: 2024-11-09

## 2024-11-05 RX ORDER — BISACODYL 10 MG
10 SUPPOSITORY, RECTAL RECTAL DAILY PRN
Status: DISCONTINUED | OUTPATIENT
Start: 2024-11-05 | End: 2024-11-12 | Stop reason: HOSPADM

## 2024-11-05 RX ORDER — ACETAMINOPHEN 325 MG/1
650 TABLET ORAL EVERY 4 HOURS PRN
Status: DISCONTINUED | OUTPATIENT
Start: 2024-11-05 | End: 2024-11-09

## 2024-11-05 RX ORDER — EPINEPHRINE 1 MG/ML
INJECTION, SOLUTION, CONCENTRATE INTRAVENOUS AS NEEDED
Status: DISCONTINUED | OUTPATIENT
Start: 2024-11-05 | End: 2024-11-05 | Stop reason: HOSPADM

## 2024-11-05 RX ORDER — FENTANYL CITRATE 50 UG/ML
50 INJECTION, SOLUTION INTRAMUSCULAR; INTRAVENOUS
Status: DISCONTINUED | OUTPATIENT
Start: 2024-11-05 | End: 2024-11-05 | Stop reason: HOSPADM

## 2024-11-05 RX ORDER — MAGNESIUM HYDROXIDE 1200 MG/15ML
LIQUID ORAL AS NEEDED
Status: DISCONTINUED | OUTPATIENT
Start: 2024-11-05 | End: 2024-11-05 | Stop reason: HOSPADM

## 2024-11-05 RX ORDER — LIDOCAINE HYDROCHLORIDE 20 MG/ML
INJECTION, SOLUTION INFILTRATION; PERINEURAL AS NEEDED
Status: DISCONTINUED | OUTPATIENT
Start: 2024-11-05 | End: 2024-11-05 | Stop reason: SURG

## 2024-11-05 RX ORDER — ECHINACEA PURPUREA EXTRACT 125 MG
1 TABLET ORAL AS NEEDED
Status: DISCONTINUED | OUTPATIENT
Start: 2024-11-05 | End: 2024-11-05 | Stop reason: SDUPTHER

## 2024-11-05 RX ORDER — HYDROCODONE BITARTRATE AND ACETAMINOPHEN 5; 325 MG/1; MG/1
1 TABLET ORAL ONCE AS NEEDED
Status: DISCONTINUED | OUTPATIENT
Start: 2024-11-05 | End: 2024-11-05 | Stop reason: HOSPADM

## 2024-11-05 RX ORDER — ONDANSETRON 2 MG/ML
4 INJECTION INTRAMUSCULAR; INTRAVENOUS ONCE AS NEEDED
Status: COMPLETED | OUTPATIENT
Start: 2024-11-05 | End: 2024-11-05

## 2024-11-05 RX ORDER — FLUMAZENIL 0.1 MG/ML
0.2 INJECTION INTRAVENOUS AS NEEDED
Status: DISCONTINUED | OUTPATIENT
Start: 2024-11-05 | End: 2024-11-05 | Stop reason: HOSPADM

## 2024-11-05 RX ORDER — NALOXONE HCL 0.4 MG/ML
0.2 VIAL (ML) INJECTION AS NEEDED
Status: DISCONTINUED | OUTPATIENT
Start: 2024-11-05 | End: 2024-11-05 | Stop reason: HOSPADM

## 2024-11-05 RX ORDER — SODIUM CHLORIDE 0.9 % (FLUSH) 0.9 %
3-10 SYRINGE (ML) INJECTION AS NEEDED
Status: DISCONTINUED | OUTPATIENT
Start: 2024-11-05 | End: 2024-11-05 | Stop reason: HOSPADM

## 2024-11-05 RX ORDER — NITROGLYCERIN 0.4 MG/1
0.4 TABLET SUBLINGUAL
Status: DISCONTINUED | OUTPATIENT
Start: 2024-11-05 | End: 2024-11-12 | Stop reason: HOSPADM

## 2024-11-05 RX ORDER — LABETALOL HYDROCHLORIDE 5 MG/ML
5 INJECTION, SOLUTION INTRAVENOUS
Status: DISCONTINUED | OUTPATIENT
Start: 2024-11-05 | End: 2024-11-05 | Stop reason: HOSPADM

## 2024-11-05 RX ORDER — FENTANYL CITRATE 50 UG/ML
50 INJECTION, SOLUTION INTRAMUSCULAR; INTRAVENOUS ONCE AS NEEDED
Status: COMPLETED | OUTPATIENT
Start: 2024-11-05 | End: 2024-11-05

## 2024-11-05 RX ORDER — ROCURONIUM BROMIDE 10 MG/ML
INJECTION, SOLUTION INTRAVENOUS AS NEEDED
Status: DISCONTINUED | OUTPATIENT
Start: 2024-11-05 | End: 2024-11-05 | Stop reason: SURG

## 2024-11-05 RX ORDER — OXYMETAZOLINE HYDROCHLORIDE 0.05 G/100ML
2 SPRAY NASAL ONCE
Status: COMPLETED | OUTPATIENT
Start: 2024-11-05 | End: 2024-11-05

## 2024-11-05 RX ORDER — ONDANSETRON 2 MG/ML
4 INJECTION INTRAMUSCULAR; INTRAVENOUS EVERY 6 HOURS PRN
Status: DISCONTINUED | OUTPATIENT
Start: 2024-11-05 | End: 2024-11-12 | Stop reason: HOSPADM

## 2024-11-05 RX ORDER — PROMETHAZINE HYDROCHLORIDE 25 MG/1
25 SUPPOSITORY RECTAL ONCE AS NEEDED
Status: DISCONTINUED | OUTPATIENT
Start: 2024-11-05 | End: 2024-11-05 | Stop reason: HOSPADM

## 2024-11-05 RX ORDER — PROMETHAZINE HYDROCHLORIDE 25 MG/1
25 TABLET ORAL ONCE AS NEEDED
Status: DISCONTINUED | OUTPATIENT
Start: 2024-11-05 | End: 2024-11-05 | Stop reason: HOSPADM

## 2024-11-05 RX ORDER — ENOXAPARIN SODIUM 100 MG/ML
60 INJECTION SUBCUTANEOUS 2 TIMES DAILY
Status: DISCONTINUED | OUTPATIENT
Start: 2024-11-07 | End: 2024-11-12 | Stop reason: HOSPADM

## 2024-11-05 RX ORDER — CEFAZOLIN SODIUM 500 MG/2.2ML
INJECTION, POWDER, FOR SOLUTION INTRAMUSCULAR; INTRAVENOUS AS NEEDED
Status: DISCONTINUED | OUTPATIENT
Start: 2024-11-05 | End: 2024-11-05 | Stop reason: SURG

## 2024-11-05 RX ORDER — SODIUM CHLORIDE, SODIUM LACTATE, POTASSIUM CHLORIDE, CALCIUM CHLORIDE 600; 310; 30; 20 MG/100ML; MG/100ML; MG/100ML; MG/100ML
INJECTION, SOLUTION INTRAVENOUS CONTINUOUS PRN
Status: DISCONTINUED | OUTPATIENT
Start: 2024-11-05 | End: 2024-11-05 | Stop reason: SURG

## 2024-11-05 RX ORDER — ONDANSETRON 2 MG/ML
4 INJECTION INTRAMUSCULAR; INTRAVENOUS EVERY 6 HOURS PRN
Status: DISCONTINUED | OUTPATIENT
Start: 2024-11-05 | End: 2024-11-09

## 2024-11-05 RX ORDER — ONDANSETRON 2 MG/ML
INJECTION INTRAMUSCULAR; INTRAVENOUS AS NEEDED
Status: DISCONTINUED | OUTPATIENT
Start: 2024-11-05 | End: 2024-11-05 | Stop reason: SURG

## 2024-11-05 RX ORDER — TRANEXAMIC ACID 100 MG/ML
INJECTION, SOLUTION INTRAVENOUS AS NEEDED
Status: DISCONTINUED | OUTPATIENT
Start: 2024-11-05 | End: 2024-11-05 | Stop reason: SURG

## 2024-11-05 RX ORDER — AMOXICILLIN 250 MG
2 CAPSULE ORAL 2 TIMES DAILY
Status: DISCONTINUED | OUTPATIENT
Start: 2024-11-05 | End: 2024-11-12 | Stop reason: HOSPADM

## 2024-11-05 RX ORDER — OXYCODONE AND ACETAMINOPHEN 7.5; 325 MG/1; MG/1
1 TABLET ORAL EVERY 4 HOURS PRN
Status: DISCONTINUED | OUTPATIENT
Start: 2024-11-05 | End: 2024-11-05 | Stop reason: HOSPADM

## 2024-11-05 RX ORDER — IPRATROPIUM BROMIDE AND ALBUTEROL SULFATE 2.5; .5 MG/3ML; MG/3ML
3 SOLUTION RESPIRATORY (INHALATION) ONCE AS NEEDED
Status: DISCONTINUED | OUTPATIENT
Start: 2024-11-05 | End: 2024-11-05 | Stop reason: HOSPADM

## 2024-11-05 RX ORDER — ONDANSETRON 4 MG/1
4 TABLET, ORALLY DISINTEGRATING ORAL EVERY 6 HOURS PRN
Status: DISCONTINUED | OUTPATIENT
Start: 2024-11-05 | End: 2024-11-12 | Stop reason: HOSPADM

## 2024-11-05 RX ORDER — DEXAMETHASONE SODIUM PHOSPHATE 4 MG/ML
INJECTION, SOLUTION INTRA-ARTICULAR; INTRALESIONAL; INTRAMUSCULAR; INTRAVENOUS; SOFT TISSUE AS NEEDED
Status: DISCONTINUED | OUTPATIENT
Start: 2024-11-05 | End: 2024-11-05 | Stop reason: SURG

## 2024-11-05 RX ORDER — FAMOTIDINE 10 MG/ML
20 INJECTION, SOLUTION INTRAVENOUS ONCE
Status: COMPLETED | OUTPATIENT
Start: 2024-11-05 | End: 2024-11-05

## 2024-11-05 RX ORDER — ECHINACEA PURPUREA EXTRACT 125 MG
2 TABLET ORAL
Status: DISCONTINUED | OUTPATIENT
Start: 2024-11-05 | End: 2024-11-12 | Stop reason: HOSPADM

## 2024-11-05 RX ORDER — DIPHENHYDRAMINE HYDROCHLORIDE 50 MG/ML
12.5 INJECTION INTRAMUSCULAR; INTRAVENOUS
Status: DISCONTINUED | OUTPATIENT
Start: 2024-11-05 | End: 2024-11-05 | Stop reason: HOSPADM

## 2024-11-05 RX ORDER — PROPOFOL 10 MG/ML
INJECTION, EMULSION INTRAVENOUS AS NEEDED
Status: DISCONTINUED | OUTPATIENT
Start: 2024-11-05 | End: 2024-11-05 | Stop reason: SURG

## 2024-11-05 RX ORDER — EPHEDRINE SULFATE 50 MG/ML
5 INJECTION, SOLUTION INTRAVENOUS ONCE AS NEEDED
Status: DISCONTINUED | OUTPATIENT
Start: 2024-11-05 | End: 2024-11-05 | Stop reason: HOSPADM

## 2024-11-05 RX ORDER — BISACODYL 5 MG/1
5 TABLET, DELAYED RELEASE ORAL DAILY PRN
Status: DISCONTINUED | OUTPATIENT
Start: 2024-11-05 | End: 2024-11-12 | Stop reason: HOSPADM

## 2024-11-05 RX ORDER — HYDROCODONE BITARTRATE AND ACETAMINOPHEN 5; 325 MG/1; MG/1
1 TABLET ORAL EVERY 4 HOURS PRN
Status: ACTIVE | OUTPATIENT
Start: 2024-11-05 | End: 2024-11-10

## 2024-11-05 RX ADMIN — SUGAMMADEX 400 MG: 100 INJECTION, SOLUTION INTRAVENOUS at 12:35

## 2024-11-05 RX ADMIN — OXYMETAZOLINE HYDROCHLORIDE 2 SPRAY: 0.5 SPRAY NASAL at 07:34

## 2024-11-05 RX ADMIN — FENTANYL CITRATE 50 MCG: 50 INJECTION, SOLUTION INTRAMUSCULAR; INTRAVENOUS at 07:56

## 2024-11-05 RX ADMIN — MUPIROCIN 1 APPLICATION: 20 OINTMENT TOPICAL at 21:35

## 2024-11-05 RX ADMIN — SALINE NASAL SPRAY 2 SPRAY: 1.5 SOLUTION NASAL at 17:47

## 2024-11-05 RX ADMIN — LIDOCAINE HYDROCHLORIDE 100 MG: 20 INJECTION, SOLUTION INFILTRATION; PERINEURAL at 07:57

## 2024-11-05 RX ADMIN — CEFAZOLIN 3 G: 225 INJECTION, POWDER, FOR SOLUTION INTRAMUSCULAR; INTRAVENOUS at 11:39

## 2024-11-05 RX ADMIN — PROPOFOL 200 MG: 10 INJECTION, EMULSION INTRAVENOUS at 08:00

## 2024-11-05 RX ADMIN — SALINE NASAL SPRAY 2 SPRAY: 1.5 SOLUTION NASAL at 19:45

## 2024-11-05 RX ADMIN — NICARDIPINE HYDROCHLORIDE 10 MG/HR: 25 INJECTION, SOLUTION INTRAVENOUS at 23:12

## 2024-11-05 RX ADMIN — SODIUM CHLORIDE, POTASSIUM CHLORIDE, SODIUM LACTATE AND CALCIUM CHLORIDE: 600; 310; 30; 20 INJECTION, SOLUTION INTRAVENOUS at 07:47

## 2024-11-05 RX ADMIN — SALINE NASAL SPRAY 2 SPRAY: 1.5 SOLUTION NASAL at 22:16

## 2024-11-05 RX ADMIN — METOPROLOL TARTRATE 2.5 MG: 5 INJECTION INTRAVENOUS at 08:36

## 2024-11-05 RX ADMIN — FENTANYL CITRATE 50 MCG: 50 INJECTION, SOLUTION INTRAMUSCULAR; INTRAVENOUS at 10:13

## 2024-11-05 RX ADMIN — FENTANYL CITRATE 50 MCG: 50 INJECTION, SOLUTION INTRAMUSCULAR; INTRAVENOUS at 07:12

## 2024-11-05 RX ADMIN — DEXAMETHASONE SODIUM PHOSPHATE 8 MG: 4 INJECTION, SOLUTION INTRAMUSCULAR; INTRAVENOUS at 08:15

## 2024-11-05 RX ADMIN — FAMOTIDINE 20 MG: 10 INJECTION INTRAVENOUS at 06:49

## 2024-11-05 RX ADMIN — METOPROLOL TARTRATE 2.5 MG: 5 INJECTION INTRAVENOUS at 08:39

## 2024-11-05 RX ADMIN — FENTANYL CITRATE 50 MCG: 50 INJECTION, SOLUTION INTRAMUSCULAR; INTRAVENOUS at 08:31

## 2024-11-05 RX ADMIN — SODIUM CHLORIDE 3000 MG: 900 INJECTION INTRAVENOUS at 07:38

## 2024-11-05 RX ADMIN — ROCURONIUM BROMIDE 25 MG: 10 INJECTION, SOLUTION INTRAVENOUS at 09:21

## 2024-11-05 RX ADMIN — ONDANSETRON 4 MG: 2 INJECTION INTRAMUSCULAR; INTRAVENOUS at 11:29

## 2024-11-05 RX ADMIN — NICARDIPINE HYDROCHLORIDE 7.5 MG/HR: 25 INJECTION, SOLUTION INTRAVENOUS at 19:17

## 2024-11-05 RX ADMIN — CEFAZOLIN 2000 MG: 2 INJECTION, POWDER, FOR SOLUTION INTRAMUSCULAR; INTRAVENOUS at 19:45

## 2024-11-05 RX ADMIN — MIDAZOLAM 1 MG: 1 INJECTION INTRAMUSCULAR; INTRAVENOUS at 07:12

## 2024-11-05 RX ADMIN — PROPOFOL 200 MG: 10 INJECTION, EMULSION INTRAVENOUS at 07:59

## 2024-11-05 RX ADMIN — ROCURONIUM BROMIDE 100 MG: 10 INJECTION, SOLUTION INTRAVENOUS at 07:59

## 2024-11-05 RX ADMIN — ONDANSETRON 4 MG: 2 INJECTION, SOLUTION INTRAMUSCULAR; INTRAVENOUS at 13:05

## 2024-11-05 RX ADMIN — TRANEXAMIC ACID 1000 MG: 1 INJECTION, SOLUTION INTRAVENOUS at 10:06

## 2024-11-05 RX ADMIN — SODIUM CHLORIDE, SODIUM LACTATE, POTASSIUM CHLORIDE, CALCIUM CHLORIDE 9 ML/HR: 20; 30; 600; 310 INJECTION, SOLUTION INTRAVENOUS at 06:49

## 2024-11-05 RX ADMIN — SALINE NASAL SPRAY 2 SPRAY: 1.5 SOLUTION NASAL at 16:00

## 2024-11-05 RX ADMIN — TRANEXAMIC ACID 1000 MG: 1 INJECTION, SOLUTION INTRAVENOUS at 10:43

## 2024-11-05 RX ADMIN — HYDROMORPHONE HYDROCHLORIDE 1 MG: 1 INJECTION, SOLUTION INTRAMUSCULAR; INTRAVENOUS; SUBCUTANEOUS at 08:43

## 2024-11-05 RX ADMIN — SALINE NASAL SPRAY 2 SPRAY: 1.5 SOLUTION NASAL at 14:15

## 2024-11-05 RX ADMIN — ROCURONIUM BROMIDE 20 MG: 10 INJECTION, SOLUTION INTRAVENOUS at 11:30

## 2024-11-05 RX ADMIN — ROCURONIUM BROMIDE 25 MG: 10 INJECTION, SOLUTION INTRAVENOUS at 10:38

## 2024-11-05 NOTE — PROGRESS NOTES
[facility] Clinical Pharmacy Services: Enoxaparin Consult    Johnny Duran has a pharmacy consult to dose prophylactic enoxaparin per Dr. Frank's request.     Indication: VTE Prophylaxis  Home Anticoagulation: N/A     Relevant clinical data and objective history reviewed:  34 y.o. male       There is no height or weight on file to calculate BMI.   Results from last 7 days   Lab Units 11/05/24  1314   PLATELETS 10*3/mm3 382     Estimated Creatinine Clearance: 284.5 mL/min (A) (by C-G formula based on SCr of 0.74 mg/dL (L)).    Assessment/Plan    Will start patient on 60mg subcutaneous every 12 hours, adjusted for renal function and BMI > 50. Consult order will be discontinued but pharmacy will continue to follow.  Start enoxaparin 11/7 @ 2100 per MD.    Conrad Solano III, [facility]  Clinical Pharmacist

## 2024-11-05 NOTE — ANESTHESIA PROCEDURE NOTES
Airway  Date/Time: 11/5/2024 8:01 AM    General Information and Staff    Anesthesiologist: Daniel Weinberg MD  CRNA/CAA: Edouard Michael CRNA    Indications and Patient Condition  Indications for airway management: airway protection    Preoxygenated: yes  Mask difficulty assessment: 2 - vent by mask + OA or adjuvant +/- NMBA    Final Airway Details  Final airway type: endotracheal airway      Successful airway: ETT  Cuffed: yes   Successful intubation technique: direct laryngoscopy  Endotracheal tube insertion site: oral  Blade: Oswald  Blade size: 2  ETT size (mm): 8.0  Cormack-Lehane Classification: grade IIa - partial view of glottis  Placement verified by: chest auscultation and capnometry   Measured from: lips  ETT/EBT  to lips (cm): 22  Number of attempts at approach: 1  Assessment: lips, teeth, and gum same as pre-op and atraumatic intubation    Additional Comments  Pre 02 100%, SIVI, DL x1, atraumatic intubation, BLBS, Positive ETC02.

## 2024-11-05 NOTE — ANESTHESIA PREPROCEDURE EVALUATION
Anesthesia Evaluation     Patient summary reviewed and Nursing notes reviewed   NPO Solid Status: > 8 hours  NPO Liquid Status: > 2 hours           Airway   Mallampati: II  TM distance: >3 FB  Neck ROM: full  No difficulty expected and Large neck circumference  Comment: Grade I view with MAC 4  Dental - normal exam     Pulmonary - normal exam    breath sounds clear to auscultation  Cardiovascular - normal exam    ECG reviewed  Rhythm: regular  Rate: normal        Neuro/Psych  (+) dizziness/light headedness, numbness  GI/Hepatic/Renal/Endo    (+) obesity, morbid obesity    ROS Comment: Pituitary macroadenoma with extrasellar extension    Musculoskeletal     Abdominal   (+) obese   Substance History      OB/GYN          Other   blood dyscrasia anemia,       Other Comment: Hgb 11.4                Anesthesia Plan    ASA 3     general     (Art line/2PIVs/do not feel intubation will be difficult but may want CMAC on standby)  intravenous induction     Anesthetic plan, risks, benefits, and alternatives have been provided, discussed and informed consent has been obtained with: patient.    CODE STATUS:

## 2024-11-05 NOTE — INTERVAL H&P NOTE
H&P reviewed. The patient was examined and there are no changes to the H&P.      Discussed the risk and benefits and alternatives of surgery including CSF leak, injury to the optic nerves, transient diabetes insipidus, injury to the carotid arteries.  He understands and is willing to proceed with surgery.

## 2024-11-05 NOTE — ANESTHESIA POSTPROCEDURE EVALUATION
Patient: Johnny Duran    Procedure Summary       Date: 11/05/24 Room / Location: [facility] OR [address] [place] [address] MAIN OR    Anesthesia Start: 0745 Anesthesia Stop: 1258    Procedures:       TRANSSPHENOIDAL RESECTION OF PITUITARY ADENOMA WITH STEALTH      TRANSSPHENOIDAL RESECTION OF PITUITARY TUMOR WITH NEUROSURGERY, RIGHT DANIEL BULLOSA RESECTION, RIGHT MAXILLARY ANTROSTOMY, POSSIBLE SEPTOPLASTY, POSSIBLE INFERIOR TURBINATE REDUCTION, POSSIBLE NASO-SEPTAL FLAP, AND IMAGE GUIDANCE (Bilateral) Diagnosis:       Pituitary macroadenoma with extrasellar extension      (Pituitary macroadenoma with extrasellar extension [D35.2])    Surgeons: Juan Frank MD; Kendall Murillo MD Provider: Daniel Weinberg MD    Anesthesia Type: general ASA Status: 3            Anesthesia Type: general    Vitals  Vitals Value Taken Time   /75 11/05/24 1545   Temp 36.7 °C (98 °F) 11/05/24 1545   Pulse 100 11/05/24 1555   Resp 18 11/05/24 1545   SpO2 100 % 11/05/24 1554   Vitals shown include unfiled device data.        Post Anesthesia Care and Evaluation    Patient location during evaluation: bedside  Patient participation: complete - patient participated  Level of consciousness: awake and alert  Pain management: adequate    Airway patency: patent  Anesthetic complications: No anesthetic complications    Cardiovascular status: acceptable  Respiratory status: acceptable  Hydration status: acceptable    Comments: /75 (BP Location: Right arm, Patient Position: Lying)   Pulse 97   Temp 36.7 °C (98 °F) (Oral)   Resp 18   SpO2 98%

## 2024-11-05 NOTE — ANESTHESIA PROCEDURE NOTES
Arterial Line      Patient reassessed immediately prior to procedure    Patient location during procedure: pre-op  Start time: 11/5/2024 7:10 AM  Stop Time:11/5/2024 7:13 AM       Line placed for hemodynamic monitoring and ABGs/Labs/ISTAT.  Performed By   Anesthesiologist: Daniel Weinberg MD   Preanesthetic Checklist  Completed: patient identified, IV checked, site marked, risks and benefits discussed, surgical consent, monitors and equipment checked, pre-op evaluation and timeout performed  Arterial Line Prep    Sterile Tech: cap, gloves and mask  Prep: ChloraPrep  Patient monitoring: blood pressure monitoring, continuous pulse oximetry and EKG  Arterial Line Procedure   Laterality:left  Location:  radial artery  Catheter size: 20 G   Guidance: ultrasound guided  PROCEDURE NOTE/ULTRASOUND INTERPRETATION.  Using ultrasound guidance the potential vascular sites for insertion of the catheter were visualized to determine the patency of the vessel to be used for vascular access.  After selecting the appropriate site for insertion, the needle was visualized under ultrasound being inserted into the radial artery, followed by ultrasound confirmation of wire and catheter placement. There were no abnormalities seen on ultrasound; an image was taken; and the patient tolerated the procedure with no complications.   Number of attempts: 1  Successful placement: yes   Post Assessment   Dressing Type: biopatch applied, occlusive dressing applied, secured with tape and wrist guard applied.   Complications no  Circ/Move/Sens Assessment: normal and unchanged.   Patient Tolerance: patient tolerated the procedure well with no apparent complications

## 2024-11-06 LAB
ALBUMIN SERPL-MCNC: 3.3 G/DL (ref 3.5–5.2)
ALBUMIN/GLOB SERPL: 1.1 G/DL
ALP SERPL-CCNC: 92 U/L (ref 39–117)
ALT SERPL W P-5'-P-CCNC: 12 U/L (ref 1–41)
ANION GAP SERPL CALCULATED.3IONS-SCNC: 10.2 MMOL/L (ref 5–15)
ANION GAP SERPL CALCULATED.3IONS-SCNC: 9.4 MMOL/L (ref 5–15)
AST SERPL-CCNC: 14 U/L (ref 1–40)
BASOPHILS # BLD AUTO: 0.02 10*3/MM3 (ref 0–0.2)
BASOPHILS NFR BLD AUTO: 0.1 % (ref 0–1.5)
BILIRUB SERPL-MCNC: <0.2 MG/DL (ref 0–1.2)
BUN SERPL-MCNC: 6 MG/DL (ref 6–20)
BUN SERPL-MCNC: 7 MG/DL (ref 6–20)
BUN/CREAT SERPL: 12.2 (ref 7–25)
BUN/CREAT SERPL: 13.7 (ref 7–25)
CALCIUM SPEC-SCNC: 8.4 MG/DL (ref 8.6–10.5)
CALCIUM SPEC-SCNC: 8.7 MG/DL (ref 8.6–10.5)
CHLORIDE SERPL-SCNC: 106 MMOL/L (ref 98–107)
CHLORIDE SERPL-SCNC: 106 MMOL/L (ref 98–107)
CO2 SERPL-SCNC: 24.8 MMOL/L (ref 22–29)
CO2 SERPL-SCNC: 26.6 MMOL/L (ref 22–29)
CORTIS SERPL-MCNC: 11.7 MCG/DL
CREAT SERPL-MCNC: 0.49 MG/DL (ref 0.76–1.27)
CREAT SERPL-MCNC: 0.51 MG/DL (ref 0.76–1.27)
DEPRECATED RDW RBC AUTO: 43.7 FL (ref 37–54)
EGFRCR SERPLBLD CKD-EPI 2021: 136.4 ML/MIN/1.73
EGFRCR SERPLBLD CKD-EPI 2021: 138.1 ML/MIN/1.73
EOSINOPHIL # BLD AUTO: 0 10*3/MM3 (ref 0–0.4)
EOSINOPHIL NFR BLD AUTO: 0 % (ref 0.3–6.2)
ERYTHROCYTE [DISTWIDTH] IN BLOOD BY AUTOMATED COUNT: 13.5 % (ref 12.3–15.4)
GLOBULIN UR ELPH-MCNC: 2.9 GM/DL
GLUCOSE BLDC GLUCOMTR-MCNC: 115 MG/DL (ref 70–130)
GLUCOSE BLDC GLUCOMTR-MCNC: 137 MG/DL (ref 70–130)
GLUCOSE BLDC GLUCOMTR-MCNC: 140 MG/DL (ref 70–130)
GLUCOSE SERPL-MCNC: 137 MG/DL (ref 65–99)
GLUCOSE SERPL-MCNC: 153 MG/DL (ref 65–99)
HCT VFR BLD AUTO: 30.9 % (ref 37.5–51)
HGB BLD-MCNC: 9.6 G/DL (ref 13–17.7)
IMM GRANULOCYTES # BLD AUTO: 0.07 10*3/MM3 (ref 0–0.05)
IMM GRANULOCYTES NFR BLD AUTO: 0.5 % (ref 0–0.5)
LYMPHOCYTES # BLD AUTO: 1.39 10*3/MM3 (ref 0.7–3.1)
LYMPHOCYTES NFR BLD AUTO: 9 % (ref 19.6–45.3)
MAGNESIUM SERPL-MCNC: 2.2 MG/DL (ref 1.6–2.6)
MCH RBC QN AUTO: 27.7 PG (ref 26.6–33)
MCHC RBC AUTO-ENTMCNC: 31.1 G/DL (ref 31.5–35.7)
MCV RBC AUTO: 89 FL (ref 79–97)
MONOCYTES # BLD AUTO: 0.81 10*3/MM3 (ref 0.1–0.9)
MONOCYTES NFR BLD AUTO: 5.3 % (ref 5–12)
NEUTROPHILS NFR BLD AUTO: 13.11 10*3/MM3 (ref 1.7–7)
NEUTROPHILS NFR BLD AUTO: 85.1 % (ref 42.7–76)
NRBC BLD AUTO-RTO: 0 /100 WBC (ref 0–0.2)
OSMOLALITY SERPL: 285 MOSM/KG (ref 275–300)
OSMOLALITY SERPL: 286 MOSM/KG (ref 275–300)
OSMOLALITY SERPL: 289 MOSM/KG (ref 275–300)
PHOSPHATE SERPL-MCNC: 2.8 MG/DL (ref 2.5–4.5)
PLATELET # BLD AUTO: 390 10*3/MM3 (ref 140–450)
PMV BLD AUTO: 9.8 FL (ref 6–12)
POTASSIUM SERPL-SCNC: 4.1 MMOL/L (ref 3.5–5.2)
POTASSIUM SERPL-SCNC: 4.2 MMOL/L (ref 3.5–5.2)
PROT SERPL-MCNC: 6.2 G/DL (ref 6–8.5)
RBC # BLD AUTO: 3.47 10*6/MM3 (ref 4.14–5.8)
SODIUM SERPL-SCNC: 139 MMOL/L (ref 136–145)
SODIUM SERPL-SCNC: 140 MMOL/L (ref 136–145)
SODIUM SERPL-SCNC: 141 MMOL/L (ref 136–145)
SODIUM SERPL-SCNC: 141 MMOL/L (ref 136–145)
SODIUM SERPL-SCNC: 142 MMOL/L (ref 136–145)
SODIUM SERPL-SCNC: 142 MMOL/L (ref 136–145)
SP GR UR STRIP: 1.02 (ref 1–1.03)
WBC NRBC COR # BLD AUTO: 15.4 10*3/MM3 (ref 3.4–10.8)

## 2024-11-06 PROCEDURE — 81003 URINALYSIS AUTO W/O SCOPE: CPT | Performed by: NEUROLOGICAL SURGERY

## 2024-11-06 PROCEDURE — 84100 ASSAY OF PHOSPHORUS: CPT | Performed by: HOSPITALIST

## 2024-11-06 PROCEDURE — 25010000002 CEFAZOLIN PER 500 MG: Performed by: NEUROLOGICAL SURGERY

## 2024-11-06 PROCEDURE — 83930 ASSAY OF BLOOD OSMOLALITY: CPT | Performed by: NEUROLOGICAL SURGERY

## 2024-11-06 PROCEDURE — 25810000003 SODIUM CHLORIDE 0.9 % SOLUTION: Performed by: NEUROLOGICAL SURGERY

## 2024-11-06 PROCEDURE — 94799 UNLISTED PULMONARY SVC/PX: CPT

## 2024-11-06 PROCEDURE — 99024 POSTOP FOLLOW-UP VISIT: CPT

## 2024-11-06 PROCEDURE — 82533 TOTAL CORTISOL: CPT | Performed by: NEUROLOGICAL SURGERY

## 2024-11-06 PROCEDURE — 25010000002 NICARDIPINE 2.5 MG/ML SOLUTION: Performed by: NEUROLOGICAL SURGERY

## 2024-11-06 PROCEDURE — 84295 ASSAY OF SERUM SODIUM: CPT | Performed by: NEUROLOGICAL SURGERY

## 2024-11-06 PROCEDURE — 85025 COMPLETE CBC W/AUTO DIFF WBC: CPT | Performed by: HOSPITALIST

## 2024-11-06 PROCEDURE — 25010000002 DESMOPRESSIN ACETATE PF 4 MCG/ML SOLUTION: Performed by: INTERNAL MEDICINE

## 2024-11-06 PROCEDURE — 82948 REAGENT STRIP/BLOOD GLUCOSE: CPT

## 2024-11-06 PROCEDURE — 25010000002 DESMOPRESSIN ACETATE PF 4 MCG/ML SOLUTION 1 ML VIAL: Performed by: INTERNAL MEDICINE

## 2024-11-06 PROCEDURE — 80053 COMPREHEN METABOLIC PANEL: CPT | Performed by: NEUROLOGICAL SURGERY

## 2024-11-06 PROCEDURE — 83735 ASSAY OF MAGNESIUM: CPT | Performed by: HOSPITALIST

## 2024-11-06 RX ORDER — AMLODIPINE BESYLATE 10 MG/1
10 TABLET ORAL
Status: DISCONTINUED | OUTPATIENT
Start: 2024-11-06 | End: 2024-11-12 | Stop reason: HOSPADM

## 2024-11-06 RX ORDER — DESMOPRESSIN ACETATE 4 UG/ML
2 INJECTION, SOLUTION INTRAVENOUS; SUBCUTANEOUS EVERY 8 HOURS
Status: DISCONTINUED | OUTPATIENT
Start: 2024-11-06 | End: 2024-11-07

## 2024-11-06 RX ADMIN — DESMOPRESSIN ACETATE 20 MCG: 4 INJECTION, SOLUTION INTRAVENOUS; SUBCUTANEOUS at 01:22

## 2024-11-06 RX ADMIN — SALINE NASAL SPRAY 2 SPRAY: 1.5 SOLUTION NASAL at 12:09

## 2024-11-06 RX ADMIN — NICARDIPINE HYDROCHLORIDE 12.5 MG/HR: 25 INJECTION, SOLUTION INTRAVENOUS at 09:21

## 2024-11-06 RX ADMIN — CEFAZOLIN 2000 MG: 2 INJECTION, POWDER, FOR SOLUTION INTRAMUSCULAR; INTRAVENOUS at 19:42

## 2024-11-06 RX ADMIN — NICARDIPINE HYDROCHLORIDE 10 MG/HR: 25 INJECTION, SOLUTION INTRAVENOUS at 11:56

## 2024-11-06 RX ADMIN — SALINE NASAL SPRAY 2 SPRAY: 1.5 SOLUTION NASAL at 08:02

## 2024-11-06 RX ADMIN — SALINE NASAL SPRAY 2 SPRAY: 1.5 SOLUTION NASAL at 10:00

## 2024-11-06 RX ADMIN — NICARDIPINE HYDROCHLORIDE 7.5 MG/HR: 25 INJECTION, SOLUTION INTRAVENOUS at 14:49

## 2024-11-06 RX ADMIN — DESMOPRESSIN ACETATE 2 MCG: 4 INJECTION, SOLUTION INTRAVENOUS; SUBCUTANEOUS at 12:58

## 2024-11-06 RX ADMIN — SALINE NASAL SPRAY 2 SPRAY: 1.5 SOLUTION NASAL at 14:04

## 2024-11-06 RX ADMIN — CEFAZOLIN 2000 MG: 2 INJECTION, POWDER, FOR SOLUTION INTRAMUSCULAR; INTRAVENOUS at 03:47

## 2024-11-06 RX ADMIN — NICARDIPINE HYDROCHLORIDE 12.5 MG/HR: 25 INJECTION, SOLUTION INTRAVENOUS at 08:01

## 2024-11-06 RX ADMIN — NICARDIPINE HYDROCHLORIDE 10 MG/HR: 25 INJECTION, SOLUTION INTRAVENOUS at 01:50

## 2024-11-06 RX ADMIN — AMLODIPINE BESYLATE 10 MG: 10 TABLET ORAL at 12:07

## 2024-11-06 RX ADMIN — MUPIROCIN 1 APPLICATION: 20 OINTMENT TOPICAL at 10:56

## 2024-11-06 RX ADMIN — CEFAZOLIN 2000 MG: 2 INJECTION, POWDER, FOR SOLUTION INTRAMUSCULAR; INTRAVENOUS at 10:56

## 2024-11-06 RX ADMIN — NICARDIPINE HYDROCHLORIDE 10 MG/HR: 25 INJECTION, SOLUTION INTRAVENOUS at 04:37

## 2024-11-06 RX ADMIN — SALINE NASAL SPRAY 2 SPRAY: 1.5 SOLUTION NASAL at 18:05

## 2024-11-06 RX ADMIN — SALINE NASAL SPRAY 2 SPRAY: 1.5 SOLUTION NASAL at 05:59

## 2024-11-06 RX ADMIN — SALINE NASAL SPRAY 2 SPRAY: 1.5 SOLUTION NASAL at 21:55

## 2024-11-06 RX ADMIN — SALINE NASAL SPRAY 2 SPRAY: 1.5 SOLUTION NASAL at 19:43

## 2024-11-06 RX ADMIN — SALINE NASAL SPRAY 2 SPRAY: 1.5 SOLUTION NASAL at 16:26

## 2024-11-06 RX ADMIN — MUPIROCIN 1 APPLICATION: 20 OINTMENT TOPICAL at 20:30

## 2024-11-06 RX ADMIN — DESMOPRESSIN ACETATE 2 MCG: 4 INJECTION, SOLUTION INTRAVENOUS; SUBCUTANEOUS at 19:43

## 2024-11-06 NOTE — CASE MANAGEMENT/SOCIAL WORK
Discharge Planning Assessment  [facility]     Patient Name: Johnny Duran  MRN: [id number]  Today's Date: 11/6/2024    Admit Date: 11/5/2024    Plan: Home with family transporting   Discharge Needs Assessment       Row Name 11/06/24 1616       Living Environment    People in Home alone    Current Living Arrangements home    Potentially Unsafe Housing Conditions none    In the past 12 months has the electric, gas, oil, or water company threatened to shut off services in your home? No    Primary Care Provided by self    Provides Primary Care For no one    Family Caregiver if Needed parent(s)    Family Caregiver Names Ekaterina Lundberg    Able to Return to Prior Arrangements yes       Resource/Environmental Concerns    Resource/Environmental Concerns none    Transportation Concerns none       Transportation Needs    In the past 12 months, has lack of transportation kept you from medical appointments or from getting medications? no    In the past 12 months, has lack of transportation kept you from meetings, work, or from getting things needed for daily living? No       Food Insecurity    Within the past 12 months, you worried that your food would run out before you got the money to buy more. Never true    Within the past 12 months, the food you bought just didn't last and you didn't have money to get more. Never true       Transition Planning    Patient/Family Anticipates Transition to home    Patient/Family Anticipated Services at Transition none    Transportation Anticipated family or friend will provide       Discharge Needs Assessment    Readmission Within the Last 30 Days no previous admission in last 30 days    Equipment Currently Used at Home none    Concerns to be Addressed denies needs/concerns at this time    Anticipated Changes Related to Illness none    Equipment Needed After Discharge other (see comments)  TBD    Provided Post Acute Provider List? N/A    N/A Provider List Comment Pt's mother denied need at  this time.    Provided Post Acute Provider Quality & Resource List? N/A    N/A Quality & Resource List Comment Pt's mother denied need at this time.                   Discharge Plan       Row Name 11/06/24 1616       Plan    Plan Home with family transporting    Patient/Family in Agreement with Plan yes    Plan Comments CCP met with pt's mother, Ekaterina Lundberg, at bedside RT pt sleeping soundly. CCP introduced self, role, & DC planning discussed. Face sheet information & pharmacy verified. Pt lives in house alone. Ekaterina denies ELISABETH & no steps inside. Pt drives. Pt is independent with ADLs. Ekaterina denies pt using any DME. Ekaterina unsure if pt has a living will or not, but declined need for information. Ekaterina unsure if pt will want to use Meds to Beds or not. Ekaterina denies pt having issues with affording or managing medications, affording food, or affording utilities. Pt has not used home health nor been to a rehab facility. DC plan is return home. Ekaterina stated family will assist as needed & will provide transportation at DC. Denies any needs/equipment. CCP will follow. IRAIDA Slater/MAURY                  Continued Care and Services - Admitted Since 11/5/2024    No active coordination exists for this encounter.       Expected Discharge Date and Time       Expected Discharge Date Expected Discharge Time    Nov 12, 2024            Demographic Summary       Row Name 11/06/24 1616       General Information    Admission Type inpatient    Arrived From operating room    Referral Source admission list    Reason for Consult discharge planning    Preferred Language English       Contact Information    Permission Granted to Share Info With                    Functional Status       Row Name 11/06/24 1616       Functional Status    Usual Activity Tolerance good    Current Activity Tolerance good       Assessment of Health Literacy    How often do you have someone help you read hospital materials? Never    How often do you have  problems learning about your medical condition because of difficulty understanding written information? Never    How often do you have a problem understanding what is told to you about your medical condition? Never    How confident are you filling out medical forms by yourself? Extremely    Health Literacy Excellent       Functional Status, IADL    Medications independent    Meal Preparation independent    Housekeeping independent    Laundry independent    Shopping independent    If for any reason you need help with day-to-day activities such as bathing, preparing meals, shopping, managing finances, etc., do you get the help you need? I don't need any help       Mental Status    General Appearance WDL WDL       Mental Status Summary    Recent Changes in Mental Status/Cognitive Functioning no changes       Employment/    Employment Status employed full-time           Shikha Cameron RN

## 2024-11-06 NOTE — PROGRESS NOTES
[facility] NEUROSURGERY INTRACRANIAL POSTOP note    PATIENT IDENTIFICATION:   Name:  Johnny Duran      MRN:  [id number]     34 y.o.  male               CC:POD #1 s/p transsphenoidal resection of pituitary adenoma with intraoperative CSF leak with repair using harvesting of fascia lotta and fat tissue graft from right leg      Subjective     Interval History: No acute issues overnight.  Did experience significant increase in urine output.  Nephrology notified and started desmopressin which is significantly decreased urine output to more favorable range.  Now putting out between 120 and 200 cc an hour from 5 to 8 AM today as opposed to 360 cc to 820 cc/hr from 8 PM to midnight last night.  Denies headaches, visual change or any upper or lower extremity numbness, tingling or weakness.  He is a bit nervous about going for MRI due to worry that he will not fit into the scanner.    Objective     Vital signs in last 24 hours:  Temp:  [97.4 °F (36.3 °C)-98.3 °F (36.8 °C)] 98.2 °F (36.8 °C)  Heart Rate:  [] 102  Resp:  [15-27] 20  BP: (111-160)/(57-85) 127/65  Arterial Line BP: (-2-157)/(-6-59) -2/-6      Intake/Output this shift:  I/O this shift:  In: -   Out: 720 [Urine:720]    Intake/Output last 3 shifts:  I/O last 3 completed shifts:  In: 3269 [I.V.:3269]  Out: 4855 [Urine:4620; Drains:35; Blood:200]    MARK drain: 35 cc since placement    LABS:  .  Results from last 7 days   Lab Units 11/06/24  0505 11/05/24  1314   WBC 10*3/mm3 15.40* 8.73   HEMOGLOBIN g/dL 9.6* 10.4*   HEMATOCRIT % 30.9* 32.6*   PLATELETS 10*3/mm3 390 382     .  Results from last 7 days   Lab Units 11/06/24  0506   SODIUM mmol/L 142  142   POTASSIUM mmol/L 4.2   CHLORIDE mmol/L 106   CO2 mmol/L 26.6   BUN mg/dL 6   CREATININE mg/dL 0.49*   GLUCOSE mg/dL 137*   CALCIUM mg/dL 8.4*      Latest Reference Range & Units 11/06/24 05:06   Cortisol mcg/dL 11.70      Latest Reference Range & Units 11/05/24 23:00 11/06/24 05:05   Osmolality 275 - 300  mOsm/kg 291 289         Latest Reference Range & Units 11/05/24 22:19   Color, UA Yellow, Straw  Yellow   Appearance, UA Clear  Clear   Specific Gravity, UA 1.005 - 1.030  1.006   pH, UA 5.0 - 8.0  6.5   Glucose Negative  Negative   Ketones, UA Negative  Negative   Blood, UA Negative  Small (1+) !   Nitrite, UA Negative  Negative   Leukocytes, UA Negative  Trace !   Protein, UA Negative  Negative   Bilirubin, UA Negative  Negative   Urobilinogen, UA 0.2 - 1.0 E.U./dL  0.2 E.U./dL   RBC, UA None Seen, 0-2 /HPF 3-5 !   WBC, UA None Seen, 0-2 /HPF 0-2   Bacteria, UA None Seen /HPF None Seen   Squamous Epithelial Cells, UA None Seen, 0-2 /HPF None Seen   Hyaline Casts, UA None Seen /LPF 0-2   Methodology:  Automated Microscopy   !: Data is abnormal    IMAGING STUDIES:  No new neuroimaging.  MRI pituitary pending pituitary    I personally viewed and interpreted the patient's labs, medications and chart.    Meds reviewed/changed: Yes    Current Facility-Administered Medications:     acetaminophen (TYLENOL) tablet 650 mg, 650 mg, Oral, Q4H PRN **OR** acetaminophen (TYLENOL) 160 MG/5ML oral solution 650 mg, 650 mg, Oral, Q4H PRN **OR** acetaminophen (TYLENOL) suppository 650 mg, 650 mg, Rectal, Q4H PRN, Juan Frank MD    acetaminophen (TYLENOL) tablet 650 mg, 650 mg, Oral, Q4H PRN **OR** acetaminophen (TYLENOL) suppository 650 mg, 650 mg, Rectal, Q4H PRN, Meek Iverson MD    aluminum-magnesium hydroxide-simethicone (MAALOX MAX) 400-400-40 MG/5ML suspension 15 mL, 15 mL, Oral, Q6H PRN, Meek Iverson MD    amLODIPine (NORVASC) tablet 10 mg, 10 mg, Oral, Q24H, Delfino Coles MD    sennosides-docusate (PERICOLACE) 8.6-50 MG per tablet 2 tablet, 2 tablet, Oral, BID **AND** polyethylene glycol (MIRALAX) packet 17 g, 17 g, Oral, Daily PRN **AND** bisacodyl (DULCOLAX) EC tablet 5 mg, 5 mg, Oral, Daily PRN **AND** bisacodyl (DULCOLAX) suppository 10 mg, 10 mg, Rectal, Daily PRN, Juan Frank MD    Calcium  Replacement - Follow Nurse / BPA Driven Protocol, , Does not apply, PRN, Meek Iverson MD    ceFAZolin 2000 mg IVPB in 100 mL NS (MBP), 2,000 mg, Intravenous, Q8H, Juan Frank MD, 2,000 mg at 11/06/24 1056    desmopressin (DDAVP) injection 2 mcg, 2 mcg, Subcutaneous, Q8H, Rene Garcia MD    [START ON 11/7/2024] Enoxaparin Sodium (LOVENOX) syringe 60 mg, 60 mg, Subcutaneous, BID, Juan Frank MD    HYDROcodone-acetaminophen (NORCO) 5-325 MG per tablet 1 tablet, 1 tablet, Oral, Q4H PRN, Juan Frank MD    Magnesium Standard Dose Replacement - Follow Nurse / BPA Driven Protocol, , Does not apply, PRNZayra Sandeep K, MD    mupirocin (BACTROBAN) 2 % nasal ointment 1 Application, 1 Application, Each Nare, BID, Meek Iverson MD, 1 Application at 11/06/24 1056    niCARdipine (CARDENE) 25 mg in 250 mL NS infusion kit, 5-15 mg/hr, Intravenous, Titrated, Juan Frank MD, Last Rate: 125 mL/hr at 11/06/24 0921, 12.5 mg/hr at 11/06/24 0921    nitroglycerin (NITROSTAT) SL tablet 0.4 mg, 0.4 mg, Sublingual, Q5 Min PRN, Meek Iverson MD    ondansetron ODT (ZOFRAN-ODT) disintegrating tablet 4 mg, 4 mg, Oral, Q6H PRN **OR** ondansetron (ZOFRAN) injection 4 mg, 4 mg, Intravenous, Q6H PRN, Juan Frank MD    ondansetron ODT (ZOFRAN-ODT) disintegrating tablet 4 mg, 4 mg, Oral, Q6H PRN **OR** ondansetron (ZOFRAN) injection 4 mg, 4 mg, Intravenous, Q6H PRN, Meek Iverson MD    [START ON 11/7/2024] Pharmacy to Dose enoxaparin (LOVENOX), , Does not apply, Nightly, Juan Frank MD    Phosphorus Replacement - Follow Nurse / BPA Driven Protocol, , Does not apply, KAITY, Meek Iverson MD    Potassium Replacement - Follow Nurse / BPA Driven Protocol, , Does not apply, Zayra BRICENO Sandeep K, MD    sodium chloride nasal spray 2 spray, 2 spray, Each Nare, Q2H While Awake, Juan Frank MD, 2 spray at 11/06/24 1000  Desmopressin 20 mcg IV piggyback given at 0122 on 11/6/2024.  Scheduled for 2 mcg of  desmopressin every 8 hours starting at 1145 today  On nicardipine drip  Physical Exam:    General:   Awake, alert, oriented x3. Speech clear with no aphasia  HEENT:    Nasal packing in place.  No drainage from naris.  Neck:    supple  CN II:    Visual fields full to confrontation  CN III IV VI:   Extraocular movements are full , PERRL 4 mm bilaterally and brisk to light  CN VII:    Facial movements are symmetric. No weakness.  Motor:    Normal muscle strength, bulk and tone in upper and lower extremities.  No fasciculations, rigidity, spasticity, or abnormal movements.  Reflexes:   Plantar responses are flexor.   Sensation:   Normal to light touch; no extinction  Station and Gait:  Gait deferred  Coordination:   Finger-to-nose and heel-to-shin test shows no dysmetria.  Extremities:   Wearing SCD.  Right lateral lower extremity incision with sutures in place and MARK drain draining sanguinous fluid.  No erythema, swelling or drainage at the suture site.    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  He experienced an intraoperative CSF leak with repair using harvesting of fascia lotta and fat tissue graft from right leg.  Yesterday afternoon patient exhibited significant increase in urine output and received one-time dose of desmopressin per nephrology.  He will start every 8 hour subcutaneous desmopressin injections today.  Please continue to monitor hourly urine output and notify neurosurgery for signs of neurologic change or CSF leak.  Currently neurologic exam is stable and he is denying headaches.  Currently he is on nicardipine drip with blood pressure managed fairly within desired parameters.  Please continue serial labs as ordered.  Appreciate nephrology input.    Mr. Duran will remain on IV Ancef for 2 full days and then switch to Unasyn for 2 days.  At that point he  "will transition to a 2-week course of oral Augmentin.    Please obtain brain MRI today.  If patient unable to tolerate scanner due to body habitus, we will schedule an outpatient open MRI.    PLAN:     Continue head of bed 30 degrees  Obtain MRI pituitary +/-  Keep MARK drain, likely remove in a.m.  As needed bowel regimen  As needed pain meds  SCD's  Lovenox for VTE prophylaxis  Observe for signs of CSF leak  Serial labs  Maintain SBP < 140      I discussed the patient's findings and my recommendations with patient, family, nursing staff, and Dr. Frank.    During patient visit, I utilized appropriate personal protective equipment including mask and gloves.  Mask used was standard procedure mask. Appropriate PPE was worn during the entire visit.  Hand hygiene was completed before and after.      LOS: 1 day       Trent Acuna, APRN 11/6/2024  11:35 EST    \"Dictated utilizing Dragon dictation\".    "

## 2024-11-06 NOTE — PAYOR COMM NOTE
"Johnny Blum (34 y.o. Male)     PLEASE SEE ATTACHED FOR INPT AUTH     REF # [id number]     PLEASE CALL KO DEVI RN/ DEPT @ [phone]  OR FAX  DEPARTMENT @  [phone]    THANK YOU   KO DEVI RN  [facility]          Date of Birth   1990    Social Security Number       Address   [address] [place] [address]    Home Phone   [phone]    MRN   [id number]       Protestant   None    Marital Status   Single                            Admission Date   11/5/24    Admission Type   Elective    Admitting Provider   Juan Frank MD    Attending Provider   Juan Frank MD    Department, Room/Bed   [facility] INTENSIVE CARE, I378/1       Discharge Date       Discharge Disposition       Discharge Destination                                 Attending Provider: Juan Frank MD    Allergies: Codeine, Bee Venom    Isolation: None   Infection: None   Code Status: CPR    Ht: 186.7 cm (73.5\")   Wt: 242 kg (533 lb)    Admission Cmt: None   Principal Problem: Pituitary macroadenoma with extrasellar extension [D35.2]                   Active Insurance as of 11/5/2024       Primary Coverage       Payor Plan Insurance Group Employer/Plan Group    R [id number]       Payor Plan Address Payor Plan Phone Number Payor Plan Fax Number Effective Dates    [address] [address] [phone]  1/1/2013 - None Entered    [facility] [id number]         Subscriber Name Subscriber Birth Date Member ID       JOHNNY BLUM 1990 [id number]                     Emergency Contacts        (Rel.) Home Phone Work Phone Mobile Phone    moises coelho (Mother) -- -- [phone]    SANTOSH BLUM (Relative) -- -- [phone]              Jber: NPI [id number]  Tax ID [id number]     History & Physical        Meek Iverson MD at 11/05/24 2039              [facility]  HISTORY AND PHYSICAL   [facility]        Patient Identification:  Name: Johnny " JATIN Duran  Age: 34 y.o.  Sex: male  :  1990  MRN: [id number]                     Primary Care Physician: Edouard Vazquez MD    Chief Complaint:    Postoperative pituitary macroadenoma resection    History of Present Illness:   34-year-old male with a history of pituitary macroadenoma who presented for resection with neurosurgery.  Patient underwent transsphenoidal resection of pituitary adenoma with Stealth.  Admitted to the ICU postoperatively for monitoring.    On evaluation of patient, he is accompanied with his family member at bedside.  Only complaint the patient has a some back pain from laying in the bed.  He is awaiting a bariatric bed.  Denies any headache or surgical site pain.  No nausea or vomiting.  Breathing is stable.  No chest pain or palpitations.  Try to get some rest.    Past Medical History:  Past Medical History:   Diagnosis Date    Anemia     Dizziness     Encounter for screening for malignant neoplasm of rectum     Gallstones     Nausea and vomiting in adult     Nondisplaced fracture of fifth left metatarsal bone     Nondisplaced longitudinal fracture of right patella, initial encounter for closed fracture     Impression: recommend ortho eval, appt today.    Obesity     Obesity, morbid, BMI 40.0-49.9     Overweight (BMI 25.0-29.9)     Plantar fasciitis     right    Screening for depression     Screening for hyperlipidemia     Stress fracture     Traumatic arthritis of right knee      Past Surgical History:  Past Surgical History:   Procedure Laterality Date    CHOLECYSTECTOMY      ORIF FOOT FRACTURE Left     PLANTAR FASCIA RELEASE Right 10/03/2022    Procedure: ENDOSCOPIC PLANTAR FASCIOTOMY;  Surgeon: CASEY Berman DPM;  Location: [facility] MAIN OR;  Service: Podiatry;  Laterality: Right;      Home Meds:  No medications prior to admission.       Allergies:  Allergies   Allergen Reactions    Codeine Hives    Bee Venom Swelling     Immunizations:  Immunization History    Administered Date(s) Administered    DTP 1990, 1990, 1991, 10/16/1991    DTaP 1995    Hep B, Adolescent or Pediatric 1995, 10/18/1995, 1996    Hib (HbOC) 1991, 1991, 10/16/1991    MMR 10/16/1991, 2002    OPV 1990, 1990, 10/16/1991, 1995    Td (TDVAX) 2002     Social History:   Social History     Social History Narrative    Not on file     Social History     Tobacco Use    Smoking status: Never     Passive exposure: Never    Smokeless tobacco: Never   Substance Use Topics    Alcohol use: Not Currently     Family History:  Family History   Problem Relation Age of Onset    Diabetes Father     Colon cancer Maternal Grandfather     Diabetes Paternal Grandfather     Malig Hyperthermia Neg Hx         Review of Systems  12 point review of systems negative except as per HPI above    Objective:  tMax 24 hrs: Temp (24hrs), Av °F (36.7 °C), Min:97.8 °F (36.6 °C), Max:98.3 °F (36.8 °C)    Vitals Ranges:   Temp:  [97.8 °F (36.6 °C)-98.3 °F (36.8 °C)] 97.8 °F (36.6 °C)  Heart Rate:  [] 113  Resp:  [15-27] 20  BP: (103-160)/(55-85) 133/70  Arterial Line BP: (-2-184)/(-6-98) -2/-6      Exam:  /70 (BP Location: Right arm, Patient Position: Lying)   Pulse 113   Temp 97.8 °F (36.6 °C) (Oral)   Resp 20   SpO2 91%     General: Alert, nontoxic, NAD, morbidly obese  HEENT: NC/AT, EOMI, MMM  Neck: Supple, trachea midline  Cardiac: RRR, no murmur, gallops, rubs  Pulmonary: Clear to auscultation bilaterally, no adventitious breath sounds, normal respiratory effort  GI: Soft, non-tender, non-distended, normal bowel sounds  Extremities: Warm, well perfused, no LE edema  Skin: no visible rash  Neuro: CN II - XII grossly intact  Psychiatry: Normal mood and affect    Data Review:    Labs:  Results from last 7 days   Lab Units 24  1314   WBC 10*3/mm3 8.73   HEMOGLOBIN g/dL 10.4*   PLATELETS 10*3/mm3 382     Results from last 7 days   Lab Units  11/05/24  1314   SODIUM mmol/L 143   POTASSIUM mmol/L 4.0   CHLORIDE mmol/L 106   CO2 mmol/L 25.7   BUN mg/dL 9   CREATININE mg/dL 0.74*   GLUCOSE mg/dL 177*   CALCIUM mg/dL 8.4*   PHOSPHORUS mg/dL 2.7   Estimated Creatinine Clearance: 284.5 mL/min (A) (by C-G formula based on SCr of 0.74 mg/dL (L)).    Results from last 7 days   Lab Units 11/05/24  1314   AST (SGOT) U/L 14   ALT (SGPT) U/L 14   PLATELETS 10*3/mm3 382             Imaging:  No imaging from this hospitalization    Assessment / Plan:    Pituitary macroadenoma status post transsphenoidal resection (11/5)  Super morbid obesity (BMI 67)  Hyperglycemia  Anemia    -Patient presented today for transsphenoidal resection of pituitary macroadenoma and admitted to the ICU postoperatively  -Nicardipine drip for SBP goal less than 140  -Every 6 hour serum osmolality, sodium, specific gravity  -Cefazolin for surgical prophylaxis  -Pain control as needed  -Awaiting bariatric bed  -Patient needs critical care monitoring for frequent labs and concerns for central DI after pituitary macroadenoma resection.    GI prophylaxis: Not indicated  DVT prophylaxis: Lovenox  Scott catheter: No  Bowel regimen: Ordered  Nutrition: Regular    Disposition: ICU due to critical state    Meek Iverson MD  [facility], [facility]  Pulmonary and Critical Care Medicine, Interventional Pulmonology    11/5/2024  20:40 EST      Electronically signed by Meek Iverson MD at 11/05/24 2102       Juan Frank MD at 11/05/24 0772            H&P reviewed. The patient was examined and there are no changes to the H&P.      Discussed the risk and benefits and alternatives of surgery including CSF leak, injury to the optic nerves, transient diabetes insipidus, injury to the carotid arteries.  He understands and is willing to proceed with surgery.    Electronically signed by Juan Frank MD at 11/05/24 9385   Source Note: H&P (View-Only)          Patient ID: Johnny Duran is a 34 y.o.  male is here today for follow-up to discuss having surgery for his pituitary macroadenoma with extra-sellar extension.    Subjective    The patient is here in regards to   Chief Complaint   Patient presents with    Follow-up    Brain Tumor       History of Present Illness  Martinez is here for his preoperative appointment.  Currently no new neurological symptoms aside from headaches and decreased concentration.      While in the room and during my examination of the patient I wore a mask and eye protection.  I washed my hands before and after this patient encounter.  The patient was also wearing a mask.    The following portions of the patient's history were reviewed and updated as appropriate: allergies, current medications, past family history, past medical history, past social history, past surgical history and problem list.    Review of Systems   Eyes:  Negative for visual disturbance.   Gastrointestinal:  Positive for nausea. Negative for vomiting.   Musculoskeletal:  Negative for neck pain and neck stiffness.   Neurological:  Positive for headaches. Negative for dizziness, weakness, light-headedness and numbness.   Psychiatric/Behavioral:  Positive for decreased concentration. Negative for confusion.         Past Medical History:   Diagnosis Date    Anemia     Encounter for screening for malignant neoplasm of rectum     Gallstones     Nausea and vomiting in adult     Nondisplaced fracture of fifth left metatarsal bone     Nondisplaced longitudinal fracture of right patella, initial encounter for closed fracture     Impression: recommend ortho eval, appt today.    Obesity     Obesity, morbid, BMI 40.0-49.9     Overweight (BMI 25.0-29.9)     Plantar fasciitis     right    Screening for depression     Screening for hyperlipidemia     Stress fracture     Traumatic arthritis of right knee        Allergies   Allergen Reactions    Codeine Hives    Bee Venom Swelling       Family History   Problem Relation Age of Onset     Diabetes Father     Colon cancer Maternal Grandfather     Diabetes Paternal Grandfather        Social History     Socioeconomic History    Marital status: Single   Tobacco Use    Smoking status: Never     Passive exposure: Never    Smokeless tobacco: Never   Vaping Use    Vaping status: Never Used   Substance and Sexual Activity    Alcohol use: Yes     Comment: Drinks beer, Drinks hard liquor. 6 drinks monthly    Drug use: No    Sexual activity: Defer       Past Surgical History:   Procedure Laterality Date    CHOLECYSTECTOMY      ORIF FOOT FRACTURE Left     PLANTAR FASCIA RELEASE Right 10/3/2022    Procedure: ENDOSCOPIC PLANTAR FASCIOTOMY;  Surgeon: CASEY Berman DPM;  Location: [facility] OR;  Service: Podiatry;  Laterality: Right;         Objective    Vitals:    10/25/24 1117   BP: 134/78   Pulse: 76   Resp: 16   Temp: 97.1 °F (36.2 °C)   SpO2: 95%     Body mass index is 67.95 kg/m².    Physical Exam  Constitutional:       General: He is awake.      Appearance: Normal appearance.   HENT:      Head: Normocephalic and atraumatic.   Eyes:      General: Lids are normal.      Extraocular Movements: Extraocular movements intact.      Conjunctiva/sclera: Conjunctivae normal.      Pupils: Pupils are equal, round, and reactive to light.   Cardiovascular:      Rate and Rhythm: Normal rate and regular rhythm.      Pulses: Normal pulses.   Pulmonary:      Breath sounds: Normal breath sounds.   Abdominal:      Palpations: Abdomen is soft.   Musculoskeletal:         General: Normal range of motion.      Cervical back: Normal range of motion and neck supple.   Skin:     General: Skin is warm and dry.   Neurological:      Mental Status: He is alert and oriented to person, place, and time.      Motor: Motor function is intact. No weakness or atrophy.      Coordination: Coordination is intact. Romberg sign negative.      Gait: Gait is intact. Gait normal.      Deep Tendon Reflexes: Reflexes are normal and symmetric.       Reflex Scores:       Tricep reflexes are 2+ on the right side and 2+ on the left side.       Bicep reflexes are 2+ on the right side and 2+ on the left side.       Brachioradialis reflexes are 2+ on the right side and 2+ on the left side.       Patellar reflexes are 2+ on the right side and 2+ on the left side.       Achilles reflexes are 2+ on the right side and 2+ on the left side.        Neurological Exam  Mental Status  Awake and alert. Oriented to person, place and time. Oriented to person, place, and time.    Cranial Nerves  CN II: Visual acuity is normal. Visual fields full to confrontation.  CN III, IV, VI: Extraocular movements intact bilaterally. Normal lids and orbits bilaterally. Pupils equal round and reactive to light bilaterally.  CN V: Facial sensation is normal.  CN VII: Full and symmetric facial movement.  CN IX, X: Palate elevates symmetrically. Normal gag reflex.  CN XI: Shoulder shrug strength is normal.  CN XII: Tongue midline without atrophy or fasciculations.    Motor                                               Right                     Left  Deltoid                                   5                          5   Biceps                                   5                          5   Iliopsoas                               5                          5   Quadriceps                           5                          5   Hamstring                             5                          5   Gastrocnemius                     5                           5   Anterior tibialis                      5                          5    Sensory  Sensation is intact to light touch, pinprick, vibration and proprioception in all four extremities.    Reflexes  Deep tendon reflexes are 2+ and symmetric in all four extremities.                                            Right                      Left  Brachioradialis                    2+                         2+  Biceps                                 2+                          2+  Triceps                                2+                         2+  Patellar                                2+                         2+  Achilles                                2+                         2+    Coordination    Finger-to-nose, rapid alternating movements and heel-to-shin normal bilaterally without dysmetria.    Gait   Normal gait. Normal gait. Romberg is absent.      Assessment & Plan  Independent Review of Radiographic Studies:      I personally reviewed the images from the following studies.        FINDINGS:    CT: CTA of the head and neck was reviewed and shows normal [place] anatomy without aneurysm.  This imaging was performed for preoperative planning.    Assessment/Plan: We discussed the risk and benefits and alternatives of surgery including CSF leak, diabetes insipidus, injury to the optic nerves and carotid arteries.  He understands is willing to proceed with surgery.    I also ensured that we have a OR bed that is capable of taking his weight which is approximately 530 pounds.    Medical Decision Making:      Transnasal transsphenoidal resection of pituitary adenoma         Diagnoses and all orders for this visit:    1. Pituitary macroadenoma with extrasellar extension (Primary)             Patient Instructions/Recommendations:    Call with any questions or concerns      Juan Frank MD  10/25/24  11:32 EDT        Electronically signed by Juan Frank MD at 10/25/24 1137                 Juan Frank MD at 10/25/24 4115          Patient ID: Johnny Duran is a 34 y.o. male is here today for follow-up to discuss having surgery for his pituitary macroadenoma with extra-sellar extension.    Subjective    The patient is here in regards to   Chief Complaint   Patient presents with    Follow-up    Brain Tumor       History of Present Illness  Martinez is here for his preoperative appointment.  Currently no new neurological symptoms aside from headaches and  decreased concentration.      While in the room and during my examination of the patient I wore a mask and eye protection.  I washed my hands before and after this patient encounter.  The patient was also wearing a mask.    The following portions of the patient's history were reviewed and updated as appropriate: allergies, current medications, past family history, past medical history, past social history, past surgical history and problem list.    Review of Systems   Eyes:  Negative for visual disturbance.   Gastrointestinal:  Positive for nausea. Negative for vomiting.   Musculoskeletal:  Negative for neck pain and neck stiffness.   Neurological:  Positive for headaches. Negative for dizziness, weakness, light-headedness and numbness.   Psychiatric/Behavioral:  Positive for decreased concentration. Negative for confusion.         Past Medical History:   Diagnosis Date    Anemia     Encounter for screening for malignant neoplasm of rectum     Gallstones     Nausea and vomiting in adult     Nondisplaced fracture of fifth left metatarsal bone     Nondisplaced longitudinal fracture of right patella, initial encounter for closed fracture     Impression: recommend ortho eval, appt today.    Obesity     Obesity, morbid, BMI 40.0-49.9     Overweight (BMI 25.0-29.9)     Plantar fasciitis     right    Screening for depression     Screening for hyperlipidemia     Stress fracture     Traumatic arthritis of right knee        Allergies   Allergen Reactions    Codeine Hives    Bee Venom Swelling       Family History   Problem Relation Age of Onset    Diabetes Father     Colon cancer Maternal Grandfather     Diabetes Paternal Grandfather        Social History     Socioeconomic History    Marital status: Single   Tobacco Use    Smoking status: Never     Passive exposure: Never    Smokeless tobacco: Never   Vaping Use    Vaping status: Never Used   Substance and Sexual Activity    Alcohol use: Yes     Comment: Drinks beer, Drinks  hard liquor. 6 drinks monthly    Drug use: No    Sexual activity: Defer       Past Surgical History:   Procedure Laterality Date    CHOLECYSTECTOMY      ORIF FOOT FRACTURE Left     PLANTAR FASCIA RELEASE Right 10/3/2022    Procedure: ENDOSCOPIC PLANTAR FASCIOTOMY;  Surgeon: CASEY Berman DPM;  Location: [facility] MAIN OR;  Service: Podiatry;  Laterality: Right;         Objective    Vitals:    10/25/24 1117   BP: 134/78   Pulse: 76   Resp: 16   Temp: 97.1 °F (36.2 °C)   SpO2: 95%     Body mass index is 67.95 kg/m².    Physical Exam  Constitutional:       General: He is awake.      Appearance: Normal appearance.   HENT:      Head: Normocephalic and atraumatic.   Eyes:      General: Lids are normal.      Extraocular Movements: Extraocular movements intact.      Conjunctiva/sclera: Conjunctivae normal.      Pupils: Pupils are equal, round, and reactive to light.   Cardiovascular:      Rate and Rhythm: Normal rate and regular rhythm.      Pulses: Normal pulses.   Pulmonary:      Breath sounds: Normal breath sounds.   Abdominal:      Palpations: Abdomen is soft.   Musculoskeletal:         General: Normal range of motion.      Cervical back: Normal range of motion and neck supple.   Skin:     General: Skin is warm and dry.   Neurological:      Mental Status: He is alert and oriented to person, place, and time.      Motor: Motor function is intact. No weakness or atrophy.      Coordination: Coordination is intact. Romberg sign negative.      Gait: Gait is intact. Gait normal.      Deep Tendon Reflexes: Reflexes are normal and symmetric.      Reflex Scores:       Tricep reflexes are 2+ on the right side and 2+ on the left side.       Bicep reflexes are 2+ on the right side and 2+ on the left side.       Brachioradialis reflexes are 2+ on the right side and 2+ on the left side.       Patellar reflexes are 2+ on the right side and 2+ on the left side.       Achilles reflexes are 2+ on the right side and 2+ on the left  side.        Neurological Exam  Mental Status  Awake and alert. Oriented to person, place and time. Oriented to person, place, and time.    Cranial Nerves  CN II: Visual acuity is normal. Visual fields full to confrontation.  CN III, IV, VI: Extraocular movements intact bilaterally. Normal lids and orbits bilaterally. Pupils equal round and reactive to light bilaterally.  CN V: Facial sensation is normal.  CN VII: Full and symmetric facial movement.  CN IX, X: Palate elevates symmetrically. Normal gag reflex.  CN XI: Shoulder shrug strength is normal.  CN XII: Tongue midline without atrophy or fasciculations.    Motor                                               Right                     Left  Deltoid                                   5                          5   Biceps                                   5                          5   Iliopsoas                               5                          5   Quadriceps                           5                          5   Hamstring                             5                          5   Gastrocnemius                     5                           5   Anterior tibialis                      5                          5    Sensory  Sensation is intact to light touch, pinprick, vibration and proprioception in all four extremities.    Reflexes  Deep tendon reflexes are 2+ and symmetric in all four extremities.                                            Right                      Left  Brachioradialis                    2+                         2+  Biceps                                 2+                         2+  Triceps                                2+                         2+  Patellar                                2+                         2+  Achilles                                2+                         2+    Coordination    Finger-to-nose, rapid alternating movements and heel-to-shin normal bilaterally without dysmetria.    Gait   Normal gait.  Normal gait. Romberg is absent.      Assessment & Plan  Independent Review of Radiographic Studies:      I personally reviewed the images from the following studies.        FINDINGS:    CT: CTA of the head and neck was reviewed and shows normal [place] anatomy without aneurysm.  This imaging was performed for preoperative planning.    Assessment/Plan: We discussed the risk and benefits and alternatives of surgery including CSF leak, diabetes insipidus, injury to the optic nerves and carotid arteries.  He understands is willing to proceed with surgery.    I also ensured that we have a OR bed that is capable of taking his weight which is approximately 530 pounds.    Medical Decision Making:      Transnasal transsphenoidal resection of pituitary adenoma         Diagnoses and all orders for this visit:    1. Pituitary macroadenoma with extrasellar extension (Primary)             Patient Instructions/Recommendations:    Call with any questions or concerns      Juan Frank MD  10/25/24  11:32 EDT        Electronically signed by Juan Frank MD at 10/25/24 1133       Lines, Drains & Airways       Active LDAs       Name Placement date Placement time Site Days    Peripheral IV 11/05/24 0620 Left;Posterior Hand 11/05/24  0620  Hand  1    Peripheral IV 11/05/24 0640 Posterior;Right Hand 11/05/24  0640  Hand  1    Closed/Suction Drain Right;Anterior Thigh Bulb 10 Fr. 11/05/24  1113  Thigh  less than 1    Urethral Catheter Straight-tip;Silicone 16 Fr. 11/05/24  0802  -- 1                  Medication Administration Report for Renee Johnny JATIN as of 11/6/24 through 11/6/24    Given  Hold  Not Given  Due  Canceled Entry  Other Actions  Time  Time  (Time)  Time  Time-Action    Discontinued  Completed  Future  MAR Hold  Linked    Medications  11/06/24        acetaminophen (TYLENOL) tablet 650 mg  Dose: 650 mg  Freq: Every 4 Hours PRN Route: PO  PRN Reason: Mild Pain  Start: 11/05/24 7087    If given for fever, use fever  parameter: fever greater than 100.4 °F  Based on patient request - if ordered for moderate or severe pain, provider allows for administration of a medication prescribed for a lower pain scale.    Do not exceed 4 grams of acetaminophen in a 24 hr period. Max dose of 2gm for AST/ALT greater than 120 units/L.    If given for pain, use the following pain scale:   Mild Pain = Pain Score of 1-3, CPOT 1-2  Moderate Pain = Pain Score of 4-6, CPOT 3-4  Severe Pain = Pain Score of 7-10, CPOT 5-8  Or    acetaminophen (TYLENOL) 160 MG/5ML oral solution 650 mg  Dose: 650 mg  Freq: Every 4 Hours PRN Route: PO  PRN Reason: Mild Pain  Start: 11/05/24 1747      Admin Instructions:  If given for fever, use fever parameter: fever greater than 100.4 °F  Based on patient request - if ordered for moderate or severe pain, provider allows for administration of a medication prescribed for a lower pain scale.    Do not exceed 4 grams of acetaminophen in a 24 hr period. Max dose of 2gm for AST/ALT greater than 120 units/L.    If given for pain, use the following pain scale:   Mild Pain = Pain Score of 1-3, CPOT 1-2  Moderate Pain = Pain Score of 4-6, CPOT 3-4  Severe Pain = Pain Score of 7-10, CPOT 5-8      acetaminophen (TYLENOL) tablet 650 mg  Dose: 650 mg  Freq: Every 4 Hours PRN Route: PO  PRN Reason: Fever  PRN Comment: fever greater than 100.4 °F or headache  Start: 11/05/24 2041    If given for fever, use fever parameter: fever greater than 100.4 °F  Based on patient request - if ordered for moderate or severe pain, provider allows for administration of a medication prescribed for a lower pain scale.    Do not exceed 4 grams of acetaminophen in a 24 hr period. Max dose of 2gm for AST/ALT greater than 120 units/L.    If given for pain, use the following pain scale:   Mild Pain = Pain Score of 1-3, CPOT 1-2  Moderate Pain = Pain Score of 4-6, CPOT 3-4  Severe Pain = Pain Score of 7-10, CPOT 5-8  Or      acetaminophen (TYLENOL)  suppository 650 mg  Dose: 650 mg  Freq: Every 4 Hours PRN Route: RE  PRN Reasons: Mild Pain,Fever  PRN Comment: temperature greater than 100.4 °F  Start: 11/05/24 2041  Admin Instructions:     Operative/Procedure Notes (last 48 hours)        Juan Frank MD at 11/05/24 0837          Transnasal Transsphenoidal Pituitary Adenoma Resection Procedure Note    Johnny Duran  11/5/2024  [id number]    CO-SURGEONS:  MD Kendall Campos MD    ASSISTANT:  Halie Carson CSA was present throughout the entire surgery to provide intraoperative suction, retraction, suturing, and irrigation to promote visualization by the operative surgeon and assist with opening and closure.  The skilled assistance was necessary for the success of this case.  Our practice does not have a relationship with neurosurgical residents.    INDICATION:  Johnny Duran is a 34 y.o. 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  We discussed the risks and benefits and alternatives of surgery including injury to the optic nerves, injury to the carotid arteries, CSF leak and diabetes insipidus.  He understands and is willing to proceed with surgery.    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]    PROCEDURE PERFORMED:  Procedure(s):  TRANSSPHENOIDAL RESECTION OF PITUITARY ADENOMA WITH STEALTH  TRANSSPHENOIDAL RESECTION OF PITUITARY TUMOR WITH NEUROSURGERY, RIGHT DANIEL BULLOSA RESECTION, RIGHT MAXILLARY ANTROSTOMY, POSSIBLE SEPTOPLASTY, POSSIBLE INFERIOR TURBINATE REDUCTION, POSSIBLE NASO-SEPTAL FLAP, AND IMAGE GUIDANCE    Transnasal, transsphenoidal approach to the anterior skull base  [place] of muscular tensor fascia lotta graft and fat graft from the right leg  Resection of pituitary adenoma  Intraoperative Stealth  navigation    Anesthesia: General    Staff:   Circulator: Darcy Oliver, IRAIDA; Slick Beebe RN  Scrub Person: Karen Mike; Arely Haddad; Elle Ashby  Vendor Representative: Juan Grey  Assistant: Halie Carson CSA    Estimated Blood Loss: 200ml    Specimens:    Order Name Source Comment Collection Info Order Time   CBC (NO DIFF)   Collected By: Arely Cabrera RN 11/5/2024 12:51 PM     Release to patient   Routine Release        COMPREHENSIVE METABOLIC PANEL   Collected By: Arely Cabrera RN 11/5/2024 12:51 PM     Release to patient   Routine Release        CORTISOL   Collected By: Arely Cabrera RN 11/5/2024 12:51 PM     Release to patient   Routine Release        OSMOLALITY, SERUM Hand, Right  Collected By: Gricelda Reed 11/5/2024 12:51 PM     Release to patient   Routine Release        SODIUM Hand, Right  Collected By: Gricelda Reed 11/5/2024 12:51 PM     Release to patient   Routine Release        SPECIFIC GRAVITY, URINE Urine, Clean Catch  Collected By: Js Baker RN 11/5/2024 12:51 PM     Release to patient   Routine Release        TISSUE PATHOLOGY EXAM Sinus, maxillary right  Collected By: Juan Frank MD 11/5/2024 11:11 AM     Release to patient   Routine Release              Drains:   Closed/Suction Drain Right;Anterior Thigh Bulb 10 Fr. (Active)   Site Description Unable to view 11/06/24 0400   Dressing Status Clean;Dry;Intact 11/06/24 0400   Drainage Appearance Serosanguineous 11/06/24 0400   Status To bulb suction 11/06/24 0400   Output (mL) 30 mL 11/06/24 0559       Urethral Catheter Straight-tip;Silicone 16 Fr. (Active)   Daily Indications Hourly Output in Critical Unstable Patient requiring Frequent Intervention (hemodynamics, titration or life supportive therapy) 11/06/24 0400   Site Assessment Clean;Skin intact 11/06/24 0400   Collection Container Standard drainage bag 11/06/24 0400   Securement Method Securing device 11/06/24 0400   Catheter care  complete Yes 11/06/24 0800   Output (mL) 120 mL 11/06/24 0800       Findings: Significant intracranial hypertension with copious bleeding from the mucosa as well as the cavernous sinus    Complications: none immediate    Narrative Description:    Positioning:  After operative consent the patient was brought into the operating room and placed under general endotracheal anesthesia using intravenous and inhalational agents.  The patient was then positioned on the operating table in the supine position. Spotwise navigation was set up and the patient was registered using a CTA that was performed preoperatively.    Tissue [place]: The right middle turbinate was resected by Dr. Murillo and the mucosa was stripped off of the aerated bony structure to use as a mucosal graft at the end of the case.      Approach:  Please see Dr. Murillo's operative report regarding the septoplasty and approach to the sphenoid fossa. After identifying the sphenoid ostium the rostrum of the sphenoid was drilled down using a high-speed Gradalis Ever drill and a coarse jabier matchstick drill bit.  Once we felt that exposure of the sphenoid was adequate superiorly and inferiorly and laterally, mucosa was stripped off the floor the sphenoid fossa.  Using landmarks such as the anterior sinus septum and the bony spines of the carotid foramen in conjunction with Stealth navigation, the sella and tumor were localized appropriately.  The drill was used to create a rectangular sphenoidotomy and the thin layer of remaining bone was removed from the surface of the dura using a caudal elevator.  Kerrison rongeurs were then used to widen the sphenoidotomy for adequate exposure.    Resection:  The dura was opened meticulously in an X shaped fashion using retractable Metrix 15 blade and the rest of the durotomy was completed using the Storz retractable sickle shaped knife.  Meticulous care was used to remain extracapsular to the tumor and pituitary gland as  the leaflets of the dura were retracted.  Based on preoperative MRI we expected the pituitary gland to be towards the right side of the sella.  Using a series of curettes, the tumor which presented in a fibrous and adherent consistency was removed with some difficulty and sent off for pathology.  We continue to debulk the tumor and did encounter some bleeding in the left cavernous sinus which we controlled with Floseal and packing of cottonoids.  We continued with dissection of the rest of the tumor removing the tumor superiorly and posteriorly.  We did also identify the pituitary gland itself and dissected the tumor off of the gland and left intact.  During the dissection of the superior aspect of the tumor, the ringed curette penetrated the diaphragma sella and we encountered copious CSF leakage.  At this point, we packed the sella with cottonoids and I proceeded with harvesting a fascia brad graft from the right leg as well as some fat to uses a graft.    Closure: A piece of DuraGen was cut to the size of twice that of a half by half cottonoid and used as an underlay and tucked underneath the bony edge of the sphenoidotomy.  A small amount of Tisseel was used in the resection cavity to stop cavernous sinus bleeding as well as seal the CSF leak.  A small fat graft was placed into the sella and the DuraGen was used as an underlay.  Then fascial autograft was used as an overlay and the nasal septal flap created by Dr. Murillo was placed over the fascia brad graft. The nose was packed per Chidi's operative report.    Juan Frank MD     Date: 2024  Time: 08:54 EST      Electronically signed by Juan Frank MD at 24 0856          Physician Progress Notes (last 24 hours)        Delfino Coles MD at 24 0734              [facility] INPATIENT PROGRESS NOTE         [facility] INTENSIVE CARE    2024      PATIENT IDENTIFICATION:  Name: Johnny Duran ADMIT: 2024   : 1990   PCP: Edouard Vazquez MD    MRN: [id number] LOS: 1 days   AGE/SEX: 34 y.o. male  ROOM: I8                     LOS 1    Reason for visit: Postop pituitary macroadenoma resection      SUBJECTIVE:      Resting comfortably.  Pain is well-controlled.  On nicardipine for blood pressure control.  Denies shortness of breath, cough, nausea or vomiting. I am seeing the patient for the first time today.  All patient problems are new to me.      Objective   OBJECTIVE:    Vital Sign Min/Max for last 24 hours  Temp  Min: 97.4 °F (36.3 °C)  Max: 98.3 °F (36.8 °C)   BP  Min: 111/65  Max: 160/81   Pulse  Min: 96  Max: 118   Resp  Min: 15  Max: 27   SpO2  Min: 87 %  Max: 100 %   No data recorded   Weight  Min: 242 kg (533 lb)  Max: 242 kg (533 lb)    Vitals:    11/06/24 0615 11/06/24 0700 11/06/24 0725 11/06/24 0801   BP: 132/72 136/80  141/84   Pulse: 101 100  101   Resp:       Temp:   98.2 °F (36.8 °C)    TempSrc:   Oral    SpO2: 98% 93%     Weight:                11/05/24  2100 11/06/24  0522   Weight: (!) 242 kg (533 lb) (!) 242 kg (533 lb)       Body mass index is 69.37 kg/m².        S RR:  [8-16] 8                 Body mass index is 69.37 kg/m².    Intake/Output Summary (Last 24 hours) at 11/6/2024 0909  Last data filed at 11/6/2024 0800  Gross per 24 hour   Intake 3269 ml   Output 4975 ml   Net -1706 ml         Exam:  GEN:  No distress, appears stated age  EYES:   PERRL, anicteric sclerae  ENT:    External ears/nose normal, OP clear  NECK:  No adenopathy, midline trachea  LUNGS: Normal chest on inspection, palpation and auscultation  CV:  Normal S1S2, without murmur  ABD:  Nontender, nondistended, no hepatosplenomegaly, +BS  EXT:  No edema.  No cyanosis or clubbing.  No mottling and normal cap refill.    Assessment     Scheduled meds:  ceFAZolin, 2,000 mg, Intravenous, Q8H  [START ON 11/7/2024] enoxaparin, 60 mg, Subcutaneous, BID  mupirocin, 1 Application, Each Nare, BID  [START ON 11/7/2024] Pharmacy to Dose  enoxaparin (LOVENOX), , Does not apply, Nightly  senna-docusate sodium, 2 tablet, Oral, BID  sodium chloride, 2 spray, Each Nare, Q2H While Awake      IV meds:                      niCARdipine, 5-15 mg/hr, Last Rate: 12.5 mg/hr (11/06/24 0801)      Data Review:  Results from last 7 days   Lab Units 11/06/24  0506 11/05/24  2300 11/05/24 2037 11/05/24  1314   SODIUM mmol/L 142  142 141  140 138 143   POTASSIUM mmol/L 4.2 4.1  --  4.0   CHLORIDE mmol/L 106 106  --  106   CO2 mmol/L 26.6 24.8  --  25.7   BUN mg/dL 6 7  --  9   CREATININE mg/dL 0.49* 0.51*  --  0.74*   GLUCOSE mg/dL 137* 153*  --  177*   CALCIUM mg/dL 8.4* 8.7  --  8.4*         Estimated Creatinine Clearance: 435.7 mL/min (A) (by C-G formula based on SCr of 0.49 mg/dL (L)).  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         ASSESSMENT:  Pituitary macroadenoma status post transsphenoidal resection (11/5)  Diabetes insipidus  Super morbid obesity (BMI 67)  Hyperglycemia  Anemia      PLAN:  Neurochecks per protocol.  Received a dose of desmopressin per nephrology orders.  Discussed with Dr. Kaden Mendez for blood pressure control and adding oral antihypertensives.  Control glucose.  DVT prophylaxis.    Discussed with multidisciplinary ICU team on rounds this morning.      CCT: 32 min    Delfino Coles MD  Pulmonary and Critical Care  [facility], [facility]  11/6/2024    09:09 EST       Electronically signed by Delfino Coles MD at 11/06/24 0912          Consult Notes (last 24 hours)        Meek Iverson MD at 11/05/24 2109        Consult Orders    1. Inpatient Pulmonology Consult [[id number]] ordered by Juan Frank MD at 11/05/24 2000                 Please see my note from the same day.    Electronically signed by Meek Iverson MD at 11/05/24 2110

## 2024-11-06 NOTE — PROGRESS NOTES
[facility] INPATIENT PROGRESS NOTE         [facility] INTENSIVE CARE    2024      PATIENT IDENTIFICATION:  Name: Johnny Duran ADMIT: 2024   : 1990  PCP: Edouard Vazquez MD    MRN: [id number] LOS: 1 days   AGE/SEX: 34 y.o. male  ROOM: [facility]                     LOS 1    Reason for visit: Postop pituitary macroadenoma resection      SUBJECTIVE:      Resting comfortably.  Pain is well-controlled.  On nicardipine for blood pressure control.  Denies shortness of breath, cough, nausea or vomiting. I am seeing the patient for the first time today.  All patient problems are new to me.      Objective   OBJECTIVE:    Vital Sign Min/Max for last 24 hours  Temp  Min: 97.4 °F (36.3 °C)  Max: 98.3 °F (36.8 °C)   BP  Min: 111/65  Max: 160/81   Pulse  Min: 96  Max: 118   Resp  Min: 15  Max: 27   SpO2  Min: 87 %  Max: 100 %   No data recorded   Weight  Min: 242 kg (533 lb)  Max: 242 kg (533 lb)    Vitals:    24 0615 24 0700 24 0725 24 0801   BP: 132/72 136/80  141/84   Pulse: 101 100  101   Resp:       Temp:   98.2 °F (36.8 °C)    TempSrc:   Oral    SpO2: 98% 93%     Weight:                24  2100 24  0522   Weight: (!) 242 kg (533 lb) (!) 242 kg (533 lb)       Body mass index is 69.37 kg/m².        S RR:  [8-16] 8                 Body mass index is 69.37 kg/m².    Intake/Output Summary (Last 24 hours) at 2024 0909  Last data filed at 2024 0800  Gross per 24 hour   Intake 3269 ml   Output 4975 ml   Net -1706 ml         Exam:  GEN:  No distress, appears stated age  EYES:   PERRL, anicteric sclerae  ENT:    External ears/nose normal, OP clear  NECK:  No adenopathy, midline trachea  LUNGS: Normal chest on inspection, palpation and auscultation  CV:  Normal S1S2, without murmur  ABD:  Nontender, nondistended, no hepatosplenomegaly, +BS  EXT:  No edema.  No cyanosis or clubbing.  No mottling and normal cap refill.    Assessment     Scheduled meds:   ceFAZolin, 2,000 mg, Intravenous, Q8H  [START ON 11/7/2024] enoxaparin, 60 mg, Subcutaneous, BID  mupirocin, 1 Application, Each Nare, BID  [START ON 11/7/2024] Pharmacy to Dose enoxaparin (LOVENOX), , Does not apply, Nightly  senna-docusate sodium, 2 tablet, Oral, BID  sodium chloride, 2 spray, Each Nare, Q2H While Awake      IV meds:                      niCARdipine, 5-15 mg/hr, Last Rate: 12.5 mg/hr (11/06/24 0801)      Data Review:  Results from last 7 days   Lab Units 11/06/24  0506 11/05/24  2300 11/05/24 2037 11/05/24  1314   SODIUM mmol/L 142  142 141  140 138 143   POTASSIUM mmol/L 4.2 4.1  --  4.0   CHLORIDE mmol/L 106 106  --  106   CO2 mmol/L 26.6 24.8  --  25.7   BUN mg/dL 6 7  --  9   CREATININE mg/dL 0.49* 0.51*  --  0.74*   GLUCOSE mg/dL 137* 153*  --  177*   CALCIUM mg/dL 8.4* 8.7  --  8.4*         Estimated Creatinine Clearance: 435.7 mL/min (A) (by C-G formula based on SCr of 0.49 mg/dL (L)).  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         ASSESSMENT:  Pituitary macroadenoma status post transsphenoidal resection (11/5)  Diabetes insipidus  Super morbid obesity (BMI 67)  Hyperglycemia  Anemia      PLAN:  Neurochecks per protocol.  Received a dose of desmopressin per nephrology orders.  Discussed with Dr. Kaden Mendez for blood pressure control and adding oral  antihypertensives.  Control glucose.  DVT prophylaxis.    Discussed with multidisciplinary ICU team on rounds this morning.      CCT: 32 min    Delfino Coles MD  Pulmonary and Critical Care Medicine  [facility], [facility]  11/6/2024    09:09 EST

## 2024-11-06 NOTE — H&P
[place] [facility]  HISTORY AND PHYSICAL   [facility]        Patient Identification:  Name: Johnny Duran  Age: 34 y.o.  Sex: male  :  1990  MRN: [id number]                     Primary Care Physician: Edouard Vazquez MD    Chief Complaint:    Postoperative pituitary macroadenoma resection    History of Present Illness:   34-year-old male with a history of pituitary macroadenoma who presented for resection with neurosurgery.  Patient underwent transsphenoidal resection of pituitary adenoma with Stealth.  Admitted to the ICU postoperatively for monitoring.    On evaluation of patient, he is accompanied with his family member at bedside.  Only complaint the patient has a some back pain from laying in the bed.  He is awaiting a bariatric bed.  Denies any headache or surgical site pain.  No nausea or vomiting.  Breathing is stable.  No chest pain or palpitations.  Try to get some rest.    Past Medical History:  Past Medical History:   Diagnosis Date    Anemia     Dizziness     Encounter for screening for malignant neoplasm of rectum     Gallstones     Nausea and vomiting in adult     Nondisplaced fracture of fifth left metatarsal bone     Nondisplaced longitudinal fracture of right patella, initial encounter for closed fracture     Impression: recommend ortho eval, appt today.    Obesity     Obesity, morbid, BMI 40.0-49.9     Overweight (BMI 25.0-29.9)     Plantar fasciitis     right    Screening for depression     Screening for hyperlipidemia     Stress fracture     Traumatic arthritis of right knee      Past Surgical History:  Past Surgical History:   Procedure Laterality Date    CHOLECYSTECTOMY      ORIF FOOT FRACTURE Left     PLANTAR FASCIA RELEASE Right 10/03/2022    Procedure: ENDOSCOPIC PLANTAR FASCIOTOMY;  Surgeon: CASEY Berman DPM;  Location: [facility] MAIN OR;  Service: Podiatry;  Laterality: Right;      Home Meds:  No medications prior to admission.        Allergies:  Allergies   Allergen Reactions    Codeine Hives    Bee Venom Swelling     Immunizations:  Immunization History   Administered Date(s) Administered    DTP 1990, 1990, 1991, 10/16/1991    DTaP 1995    Hep B, Adolescent or Pediatric 1995, 10/18/1995, 1996    Hib (HbOC) 1991, 1991, 10/16/1991    MMR 10/16/1991, 2002    OPV 1990, 1990, 10/16/1991, 1995    Td (TDVAX) 2002     Social History:   Social History     Social History Narrative    Not on file     Social History     Tobacco Use    Smoking status: Never     Passive exposure: Never    Smokeless tobacco: Never   Substance Use Topics    Alcohol use: Not Currently     Family History:  Family History   Problem Relation Age of Onset    Diabetes Father     Colon cancer Maternal Grandfather     Diabetes Paternal Grandfather     Malig Hyperthermia Neg Hx         Review of Systems  12 point review of systems negative except as per HPI above    Objective:  tMax 24 hrs: Temp (24hrs), Av °F (36.7 °C), Min:97.8 °F (36.6 °C), Max:98.3 °F (36.8 °C)    Vitals Ranges:   Temp:  [97.8 °F (36.6 °C)-98.3 °F (36.8 °C)] 97.8 °F (36.6 °C)  Heart Rate:  [] 113  Resp:  [15-27] 20  BP: (103-160)/(55-85) 133/70  Arterial Line BP: (-2-184)/(-6-98) -2/-6      Exam:  /70 (BP Location: Right arm, Patient Position: Lying)   Pulse 113   Temp 97.8 °F (36.6 °C) (Oral)   Resp 20   SpO2 91%     General: Alert, nontoxic, NAD, morbidly obese  HEENT: NC/AT, EOMI, MMM  Neck: Supple, trachea midline  Cardiac: RRR, no murmur, gallops, rubs  Pulmonary: Clear to auscultation bilaterally, no adventitious breath sounds, normal respiratory effort  GI: Soft, non-tender, non-distended, normal bowel sounds  Extremities: Warm, well perfused, no LE edema  Skin: no visible rash  Neuro: CN II - XII grossly intact  Psychiatry: Normal mood and affect    Data Review:    Labs:  Results from last 7 days   Lab  Units 11/05/24  1314   WBC 10*3/mm3 8.73   HEMOGLOBIN g/dL 10.4*   PLATELETS 10*3/mm3 382     Results from last 7 days   Lab Units 11/05/24  1314   SODIUM mmol/L 143   POTASSIUM mmol/L 4.0   CHLORIDE mmol/L 106   CO2 mmol/L 25.7   BUN mg/dL 9   CREATININE mg/dL 0.74*   GLUCOSE mg/dL 177*   CALCIUM mg/dL 8.4*   PHOSPHORUS mg/dL 2.7   Estimated Creatinine Clearance: 284.5 mL/min (A) (by C-G formula based on SCr of 0.74 mg/dL (L)).    Results from last 7 days   Lab Units 11/05/24  1314   AST (SGOT) U/L 14   ALT (SGPT) U/L 14   PLATELETS 10*3/mm3 382             Imaging:  No imaging from this hospitalization    Assessment / Plan:    Pituitary macroadenoma status post transsphenoidal resection (11/5)  Super morbid obesity (BMI 67)  Hyperglycemia  Anemia    -Patient presented today for transsphenoidal resection of pituitary macroadenoma and admitted to the ICU postoperatively  -Nicardipine drip for SBP goal less than 140  -Every 6 hour serum osmolality, sodium, specific gravity  -Cefazolin for surgical prophylaxis  -Pain control as needed  -Awaiting bariatric bed  -Patient needs critical care monitoring for frequent labs and concerns for central DI after pituitary macroadenoma resection.    GI prophylaxis: Not indicated  DVT prophylaxis: Lovenox  Scott catheter: No  Bowel regimen: Ordered  Nutrition: Regular    Disposition: ICU due to critical state    Meek Iverson MD  [facility], [facility]  Pulmonary and Critical Care Medicine, Interventional Pulmonology    11/5/2024  20:40 EST

## 2024-11-06 NOTE — NURSING NOTE
Pt had transphenoidal resection yesterday with Dr. Frank. Neuro sx made aware of increased UO from FC >250. Nephrology consulted for DI management. Vasopressin ordered x1 per Dr. Garcia. UO well controlled afterwards, <250 from FC. Pt remains on cardene gtt to keep SBP<140. No neuro changes overnight, pt A&Ox4, moving everything. No new drainage from nose, only crusted bloody drainage from surgery. Sats dropping while asleep, pt placed on face tent 3L. Pt transferred to bariatric bed. Awaiting AM USG. Mother at bedside throughout night. Plan of care continues.

## 2024-11-06 NOTE — CONSULTS
[facility] Consult Note      Patient Name: Johnny Duran  : 1990  MRN: [id number]  Primary Care Physician:  Edouard Vazquez MD  Referring Physician: Edouard Vazquez, *  Date of admission: 2024    Subjective     Reason for Consult: Central DI    HPI:   Johnny Duran is a 34 y.o. male patient was admitted on 2024, underwent transnasal transsphenoidal pituitary adenoma resection, noted to have increased urine output hence nephrology consult was requested he has very dilute urine, he received 20 mcg of desmopressin last night and his urine output remains elevated but slower rate.  He is morbidly obese history of hyperglycemia and dyslipidemia.  The patient is comfortable denies any chest pain or shortness of air, no orthopnea or PND.    Review of Systems:   14 point review of systems is otherwise negative except for mentioned above on HPI    Personal History     Past Medical History:   Diagnosis Date   • Anemia    • Dizziness    • Encounter for screening for malignant neoplasm of rectum    • Gallstones    • Nausea and vomiting in adult    • Nondisplaced fracture of fifth left metatarsal bone    • Nondisplaced longitudinal fracture of right patella, initial encounter for closed fracture     Impression: recommend ortho eval, appt today.   • Obesity    • Obesity, morbid, BMI 40.0-49.9    • Overweight (BMI 25.0-29.9)    • Plantar fasciitis     right   • Screening for depression    • Screening for hyperlipidemia    • Stress fracture    • Traumatic arthritis of right knee        Past Surgical History:   Procedure Laterality Date   • CHOLECYSTECTOMY     • ORIF FOOT FRACTURE Left    • PLANTAR FASCIA RELEASE Right 10/03/2022    Procedure: ENDOSCOPIC PLANTAR FASCIOTOMY;  Surgeon: CASEY Berman DPM;  Location: [facility] OR;  Service: Podiatry;  Laterality: Right;       Family History: family history includes Colon cancer in his maternal grandfather; Diabetes  in his father and paternal grandfather.    Social History:  reports that he has never smoked. He has never been exposed to tobacco smoke. He has never used smokeless tobacco. He reports that he does not currently use alcohol. He reports that he does not use drugs.    Home Medications:  Prior to Admission medications    Not on File       Allergies:  Allergies   Allergen Reactions   • Codeine Hives   • Bee Venom Swelling       Objective     Vitals:   Temp:  [97.4 °F (36.3 °C)-98.3 °F (36.8 °C)] 98.2 °F (36.8 °C)  Heart Rate:  [] 102  Resp:  [15-27] 20  BP: (111-160)/(57-85) 144/82  Arterial Line BP: (-2-157)/(-6-59) -2/-6  Flow (L/min) (Oxygen Therapy):  [3-12] 12    Intake/Output Summary (Last 24 hours) at 11/6/2024 1054  Last data filed at 11/6/2024 0923  Gross per 24 hour   Intake 3269 ml   Output 5225 ml   Net -1956 ml       Physical Exam:   Constitutional: Awake, alert, no acute distress.  Super morbidly obese  HEENT: Sclera anicteric, no conjunctival injection  Neck: No JVD  Respiratory: Clear to auscultation bilaterally, nonlabored respiration  Cardiovascular: RRR, no murmurs, no rubs or gallops, no carotid bruit  Gastrointestinal: Positive bowel sounds, abdomen is soft, nontender and nondistended  : No palpable bladder, Scott catheter anchored  Musculoskeletal: No edema, no clubbing or cyanosis  Psychiatric: Flat affect, cooperative  Neurologic: Moving all extremities, normal speech and mental status  Skin: Warm and dry       Scheduled Meds:     amLODIPine, 10 mg, Oral, Q24H  ceFAZolin, 2,000 mg, Intravenous, Q8H  desmopressin, 2 mcg, Subcutaneous, Q8H  [START ON 11/7/2024] enoxaparin, 60 mg, Subcutaneous, BID  mupirocin, 1 Application, Each Nare, BID  [START ON 11/7/2024] Pharmacy to Dose enoxaparin (LOVENOX), , Does not apply, Nightly  senna-docusate sodium, 2 tablet, Oral, BID  sodium chloride, 2 spray, Each Nare, Q2H While Awake      IV Meds:   niCARdipine, 5-15 mg/hr, Last Rate: 12.5 mg/hr  (11/06/24 0921)        Results Reviewed:   I have personally reviewed the results from the time of this admission to 11/6/2024 10:54 EST     Lab Results   Component Value Date    GLUCOSE 137 (H) 11/06/2024    CALCIUM 8.4 (L) 11/06/2024     11/06/2024     11/06/2024    K 4.2 11/06/2024    CO2 26.6 11/06/2024     11/06/2024    BUN 6 11/06/2024    CREATININE 0.49 (L) 11/06/2024    EGFRIFAFRI 139 10/15/2021    EGFRIFNONA 120 10/15/2021    BCR 12.2 11/06/2024    ANIONGAP 9.4 11/06/2024      Lab Results   Component Value Date    MG 2.2 11/06/2024    PHOS 2.8 11/06/2024    ALBUMIN 3.3 (L) 11/06/2024           Assessment / Plan       Pituitary macroadenoma with extrasellar extension      ASSESSMENT:  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    PLAN:  Will continue to monitor sodium and his urine output closely  Start desmopressin 2 mcg subcu every 8 hours and adjust the dose as needed  Surveillance labs    I reviewed the chart and other providers notes, reviewed labs.  I discussed the case with Dr. Coles    Thank you for involving us in the care of Johnny Duran.  Please feel free to call with any questions.    Rene Garcia MD  11/06/24  10:54 EST    [facility]  [phone]      Please note that portions of this note were completed with a voice recognition program.

## 2024-11-06 NOTE — OP NOTE
Transnasal Transsphenoidal Pituitary Adenoma Resection Procedure Note    Johnny Duran  11/5/2024  [id number]    CO-SURGEONS:  MD Kendall Campos MD    ASSISTANT:  Halie Carson CSA was present throughout the entire surgery to provide intraoperative suction, retraction, suturing, and irrigation to promote visualization by the operative surgeon and assist with opening and closure.  The skilled assistance was necessary for the success of this case.  Our practice does not have a relationship with neurosurgical residents.    INDICATION:  Johnny Duran is a 34 y.o. 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  We discussed the risks and benefits and alternatives of surgery including injury to the optic nerves, injury to the carotid arteries, CSF leak and diabetes insipidus.  He understands and is willing to proceed with surgery.    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]    PROCEDURE PERFORMED:  Procedure(s):  TRANSSPHENOIDAL RESECTION OF PITUITARY ADENOMA WITH STEALTH  TRANSSPHENOIDAL RESECTION OF PITUITARY TUMOR WITH NEUROSURGERY, RIGHT DANIEL BULLOSA RESECTION, RIGHT MAXILLARY ANTROSTOMY, POSSIBLE SEPTOPLASTY, POSSIBLE INFERIOR TURBINATE REDUCTION, POSSIBLE NASO-SEPTAL FLAP, AND IMAGE GUIDANCE    Transnasal, transsphenoidal approach to the anterior skull base  [place] of muscular tensor fascia lotta graft and fat graft from the right leg  Resection of pituitary adenoma  Intraoperative Stealth navigation    Anesthesia: General    Staff:   Circulator: Darcy Oliver RN; Slick Beebe RN  Scrub Person: Karen Mike; Rigoberto Haddad Katherine  Vendor Representative: Juan Grey  Assistant: Halie Carson CSA    Estimated Blood Loss: 200ml    Specimens:     Order Name Source Comment Collection Info Order Time   CBC (NO DIFF)   Collected By: Arely Cabrera RN 11/5/2024 12:51 PM     Release to patient   Routine Release        COMPREHENSIVE METABOLIC PANEL   Collected By: Arely Cabrera RN 11/5/2024 12:51 PM     Release to patient   Routine Release        CORTISOL   Collected By: Arely Cabrera RN 11/5/2024 12:51 PM     Release to patient   Routine Release        OSMOLALITY, SERUM Hand, Right  Collected By: Gricelda Reed 11/5/2024 12:51 PM     Release to patient   Routine Release        SODIUM Hand, Right  Collected By: Gricelda Reed 11/5/2024 12:51 PM     Release to patient   Routine Release        SPECIFIC GRAVITY, URINE Urine, Clean Catch  Collected By: Js Baker RN 11/5/2024 12:51 PM     Release to patient   Routine Release        TISSUE PATHOLOGY EXAM Sinus, maxillary right  Collected By: Juan Frank MD 11/5/2024 11:11 AM     Release to patient   Routine Release              Drains:   Closed/Suction Drain Right;Anterior Thigh Bulb 10 Fr. (Active)   Site Description Unable to view 11/06/24 0400   Dressing Status Clean;Dry;Intact 11/06/24 0400   Drainage Appearance Serosanguineous 11/06/24 0400   Status To bulb suction 11/06/24 0400   Output (mL) 30 mL 11/06/24 0559       Urethral Catheter Straight-tip;Silicone 16 Fr. (Active)   Daily Indications Hourly Output in Critical Unstable Patient requiring Frequent Intervention (hemodynamics, titration or life supportive therapy) 11/06/24 0400   Site Assessment Clean;Skin intact 11/06/24 0400   Collection Container Standard drainage bag 11/06/24 0400   Securement Method Securing device 11/06/24 0400   Catheter care complete Yes 11/06/24 0800   Output (mL) 120 mL 11/06/24 0800       Findings: Significant intracranial hypertension with copious bleeding from the mucosa as well as the cavernous sinus    Complications: none immediate    Narrative Description:    Positioning:  After operative consent the  patient was brought into the operating room and placed under general endotracheal anesthesia using intravenous and inhalational agents.  The patient was then positioned on the operating table in the supine position. [employer] navigation was set up and the patient was registered using a CTA that was performed preoperatively.    Tissue Tracy: The right middle turbinate was resected by Dr. Murillo and the mucosa was stripped off of the aerated bony structure to use as a mucosal graft at the end of the case.      Approach:  Please see Dr. Murillo's operative report regarding the septoplasty and approach to the sphenoid fossa. After identifying the sphenoid ostium the rostrum of the sphenoid was drilled down using a high-speed Earnestas Ever drill and a coarse jabier matchstick drill bit.  Once we felt that exposure of the sphenoid was adequate superiorly and inferiorly and laterally, mucosa was stripped off the floor the sphenoid fossa.  Using landmarks such as the anterior sinus septum and the bony spines of the carotid foramen in conjunction with Stealth navigation, the sella and tumor were localized appropriately.  The drill was used to create a rectangular sphenoidotomy and the thin layer of remaining bone was removed from the surface of the dura using a caudal elevator.  Kerrison rongeurs were then used to widen the sphenoidotomy for adequate exposure.    Resection:  The dura was opened meticulously in an X shaped fashion using retractable Metrix 15 blade and the rest of the durotomy was completed using the Storz retractable sickle shaped knife.  Meticulous care was used to remain extracapsular to the tumor and pituitary gland as the leaflets of the dura were retracted.  Based on preoperative MRI we expected the pituitary gland to be towards the right side of the sella.  Using a series of curettes, the tumor which presented in a fibrous and adherent consistency was removed with some difficulty and sent off for  pathology.  We continue to debulk the tumor and did encounter some bleeding in the left cavernous sinus which we controlled with Floseal and packing of cottonoids.  We continued with dissection of the rest of the tumor removing the tumor superiorly and posteriorly.  We did also identify the pituitary gland itself and dissected the tumor off of the gland and left intact.  During the dissection of the superior aspect of the tumor, the ringed curette penetrated the diaphragma sella and we encountered copious CSF leakage.  At this point, we packed the sella with cottonoids and I proceeded with harvesting a fascia brad graft from the right leg as well as some fat to uses a graft.    Closure: A piece of DuraGen was cut to the size of twice that of a half by half cottonoid and used as an underlay and tucked underneath the bony edge of the sphenoidotomy.  A small amount of Tisseel was used in the resection cavity to stop cavernous sinus bleeding as well as seal the CSF leak.  A small fat graft was placed into the sella and the DuraGen was used as an underlay.  Then fascial autograft was used as an overlay and the nasal septal flap created by Dr. Murillo was placed over the fascia brad graft. The nose was packed per Chdii's operative report.    Juan Frank MD     Date: 11/6/2024  Time: 08:54 EST

## 2024-11-07 LAB
ALBUMIN SERPL-MCNC: 3.5 G/DL (ref 3.5–5.2)
ALBUMIN/GLOB SERPL: 1.2 G/DL
ALP SERPL-CCNC: 94 U/L (ref 39–117)
ALT SERPL W P-5'-P-CCNC: 10 U/L (ref 1–41)
ANION GAP SERPL CALCULATED.3IONS-SCNC: 5.3 MMOL/L (ref 5–15)
AST SERPL-CCNC: 12 U/L (ref 1–40)
BASOPHILS # BLD AUTO: 0.03 10*3/MM3 (ref 0–0.2)
BASOPHILS NFR BLD AUTO: 0.3 % (ref 0–1.5)
BILIRUB SERPL-MCNC: 0.3 MG/DL (ref 0–1.2)
BUN SERPL-MCNC: 9 MG/DL (ref 6–20)
BUN/CREAT SERPL: 23.7 (ref 7–25)
CALCIUM SPEC-SCNC: 8.4 MG/DL (ref 8.6–10.5)
CHLORIDE SERPL-SCNC: 101 MMOL/L (ref 98–107)
CO2 SERPL-SCNC: 29.7 MMOL/L (ref 22–29)
CORTIS SERPL-MCNC: 12.7 MCG/DL
CREAT SERPL-MCNC: 0.38 MG/DL (ref 0.76–1.27)
DEPRECATED RDW RBC AUTO: 40.8 FL (ref 37–54)
EGFRCR SERPLBLD CKD-EPI 2021: 149.1 ML/MIN/1.73
EOSINOPHIL # BLD AUTO: 0.03 10*3/MM3 (ref 0–0.4)
EOSINOPHIL NFR BLD AUTO: 0.3 % (ref 0.3–6.2)
ERYTHROCYTE [DISTWIDTH] IN BLOOD BY AUTOMATED COUNT: 13 % (ref 12.3–15.4)
GLOBULIN UR ELPH-MCNC: 2.9 GM/DL
GLUCOSE BLDC GLUCOMTR-MCNC: 102 MG/DL (ref 70–130)
GLUCOSE BLDC GLUCOMTR-MCNC: 103 MG/DL (ref 70–130)
GLUCOSE BLDC GLUCOMTR-MCNC: 106 MG/DL (ref 70–130)
GLUCOSE BLDC GLUCOMTR-MCNC: 111 MG/DL (ref 70–130)
GLUCOSE BLDC GLUCOMTR-MCNC: 99 MG/DL (ref 70–130)
GLUCOSE SERPL-MCNC: 108 MG/DL (ref 65–99)
HCT VFR BLD AUTO: 29.5 % (ref 37.5–51)
HGB BLD-MCNC: 9.4 G/DL (ref 13–17.7)
IMM GRANULOCYTES # BLD AUTO: 0.04 10*3/MM3 (ref 0–0.05)
IMM GRANULOCYTES NFR BLD AUTO: 0.3 % (ref 0–0.5)
LYMPHOCYTES # BLD AUTO: 1.93 10*3/MM3 (ref 0.7–3.1)
LYMPHOCYTES NFR BLD AUTO: 16.6 % (ref 19.6–45.3)
MAGNESIUM SERPL-MCNC: 2.2 MG/DL (ref 1.6–2.6)
MCH RBC QN AUTO: 27.6 PG (ref 26.6–33)
MCHC RBC AUTO-ENTMCNC: 31.9 G/DL (ref 31.5–35.7)
MCV RBC AUTO: 86.8 FL (ref 79–97)
MONOCYTES # BLD AUTO: 0.79 10*3/MM3 (ref 0.1–0.9)
MONOCYTES NFR BLD AUTO: 6.8 % (ref 5–12)
NEUTROPHILS NFR BLD AUTO: 75.7 % (ref 42.7–76)
NEUTROPHILS NFR BLD AUTO: 8.78 10*3/MM3 (ref 1.7–7)
NRBC BLD AUTO-RTO: 0 /100 WBC (ref 0–0.2)
OSMOLALITY SERPL: 280 MOSM/KG (ref 275–300)
OSMOLALITY SERPL: 281 MOSM/KG (ref 275–300)
OSMOLALITY SERPL: 286 MOSM/KG (ref 275–300)
OSMOLALITY SERPL: 286 MOSM/KG (ref 275–300)
PHOSPHATE SERPL-MCNC: 2.1 MG/DL (ref 2.5–4.5)
PHOSPHATE SERPL-MCNC: 2.2 MG/DL (ref 2.5–4.5)
PLATELET # BLD AUTO: 350 10*3/MM3 (ref 140–450)
PMV BLD AUTO: 9.3 FL (ref 6–12)
POTASSIUM SERPL-SCNC: 3.9 MMOL/L (ref 3.5–5.2)
PROT SERPL-MCNC: 6.4 G/DL (ref 6–8.5)
RBC # BLD AUTO: 3.4 10*6/MM3 (ref 4.14–5.8)
SODIUM SERPL-SCNC: 134 MMOL/L (ref 136–145)
SODIUM SERPL-SCNC: 136 MMOL/L (ref 136–145)
SODIUM SERPL-SCNC: 136 MMOL/L (ref 136–145)
SODIUM SERPL-SCNC: 137 MMOL/L (ref 136–145)
SP GR UR STRIP: 1.02 (ref 1–1.03)
URATE SERPL-MCNC: 3.3 MG/DL (ref 3.4–7)
WBC NRBC COR # BLD AUTO: 11.6 10*3/MM3 (ref 3.4–10.8)

## 2024-11-07 PROCEDURE — 81003 URINALYSIS AUTO W/O SCOPE: CPT | Performed by: NEUROLOGICAL SURGERY

## 2024-11-07 PROCEDURE — 25810000003 SODIUM CHLORIDE 0.9 % SOLUTION: Performed by: NEUROLOGICAL SURGERY

## 2024-11-07 PROCEDURE — 83930 ASSAY OF BLOOD OSMOLALITY: CPT | Performed by: NEUROLOGICAL SURGERY

## 2024-11-07 PROCEDURE — 25010000002 AMPICILLIN-SULBACTAM PER 1.5 G

## 2024-11-07 PROCEDURE — 85025 COMPLETE CBC W/AUTO DIFF WBC: CPT | Performed by: HOSPITALIST

## 2024-11-07 PROCEDURE — 99024 POSTOP FOLLOW-UP VISIT: CPT

## 2024-11-07 PROCEDURE — 82948 REAGENT STRIP/BLOOD GLUCOSE: CPT

## 2024-11-07 PROCEDURE — 25010000002 CEFAZOLIN PER 500 MG: Performed by: NEUROLOGICAL SURGERY

## 2024-11-07 PROCEDURE — 82533 TOTAL CORTISOL: CPT | Performed by: NEUROLOGICAL SURGERY

## 2024-11-07 PROCEDURE — 84295 ASSAY OF SERUM SODIUM: CPT | Performed by: NEUROLOGICAL SURGERY

## 2024-11-07 PROCEDURE — 84550 ASSAY OF BLOOD/URIC ACID: CPT | Performed by: INTERNAL MEDICINE

## 2024-11-07 PROCEDURE — 25010000002 DESMOPRESSIN ACETATE PF 4 MCG/ML SOLUTION: Performed by: INTERNAL MEDICINE

## 2024-11-07 PROCEDURE — 94799 UNLISTED PULMONARY SVC/PX: CPT

## 2024-11-07 PROCEDURE — 94761 N-INVAS EAR/PLS OXIMETRY MLT: CPT

## 2024-11-07 PROCEDURE — 84100 ASSAY OF PHOSPHORUS: CPT | Performed by: INTERNAL MEDICINE

## 2024-11-07 PROCEDURE — 25010000002 NICARDIPINE 2.5 MG/ML SOLUTION: Performed by: NEUROLOGICAL SURGERY

## 2024-11-07 PROCEDURE — 83735 ASSAY OF MAGNESIUM: CPT | Performed by: HOSPITALIST

## 2024-11-07 PROCEDURE — 84100 ASSAY OF PHOSPHORUS: CPT | Performed by: HOSPITALIST

## 2024-11-07 PROCEDURE — 25010000002 ENOXAPARIN PER 10 MG: Performed by: NEUROLOGICAL SURGERY

## 2024-11-07 PROCEDURE — 94760 N-INVAS EAR/PLS OXIMETRY 1: CPT

## 2024-11-07 PROCEDURE — 80053 COMPREHEN METABOLIC PANEL: CPT | Performed by: NEUROLOGICAL SURGERY

## 2024-11-07 RX ORDER — DESMOPRESSIN ACETATE 4 UG/ML
1 INJECTION, SOLUTION INTRAVENOUS; SUBCUTANEOUS EVERY 12 HOURS
Status: DISCONTINUED | OUTPATIENT
Start: 2024-11-07 | End: 2024-11-10

## 2024-11-07 RX ORDER — HYDRALAZINE HYDROCHLORIDE 25 MG/1
25 TABLET, FILM COATED ORAL EVERY 8 HOURS SCHEDULED
Status: DISCONTINUED | OUTPATIENT
Start: 2024-11-07 | End: 2024-11-08

## 2024-11-07 RX ORDER — DESMOPRESSIN ACETATE 4 UG/ML
1 INJECTION, SOLUTION INTRAVENOUS; SUBCUTANEOUS EVERY 8 HOURS
Status: DISCONTINUED | OUTPATIENT
Start: 2024-11-07 | End: 2024-11-07

## 2024-11-07 RX ADMIN — SALINE NASAL SPRAY 2 SPRAY: 1.5 SOLUTION NASAL at 18:03

## 2024-11-07 RX ADMIN — DESMOPRESSIN ACETATE 2 MCG: 4 INJECTION, SOLUTION INTRAVENOUS; SUBCUTANEOUS at 03:03

## 2024-11-07 RX ADMIN — SALINE NASAL SPRAY 2 SPRAY: 1.5 SOLUTION NASAL at 07:50

## 2024-11-07 RX ADMIN — AMPICILLIN SODIUM AND SULBACTAM SODIUM 3 G: 2; 1 INJECTION, POWDER, FOR SOLUTION INTRAMUSCULAR; INTRAVENOUS at 23:52

## 2024-11-07 RX ADMIN — SALINE NASAL SPRAY 2 SPRAY: 1.5 SOLUTION NASAL at 14:15

## 2024-11-07 RX ADMIN — DESMOPRESSIN ACETATE 1 MCG: 4 INJECTION, SOLUTION INTRAVENOUS; SUBCUTANEOUS at 23:52

## 2024-11-07 RX ADMIN — AMPICILLIN SODIUM AND SULBACTAM SODIUM 3 G: 2; 1 INJECTION, POWDER, FOR SOLUTION INTRAMUSCULAR; INTRAVENOUS at 18:02

## 2024-11-07 RX ADMIN — SALINE NASAL SPRAY 2 SPRAY: 1.5 SOLUTION NASAL at 16:23

## 2024-11-07 RX ADMIN — Medication 2 PACKET: at 16:23

## 2024-11-07 RX ADMIN — SALINE NASAL SPRAY 2 SPRAY: 1.5 SOLUTION NASAL at 05:25

## 2024-11-07 RX ADMIN — SALINE NASAL SPRAY 2 SPRAY: 1.5 SOLUTION NASAL at 19:57

## 2024-11-07 RX ADMIN — CEFAZOLIN 2000 MG: 2 INJECTION, POWDER, FOR SOLUTION INTRAMUSCULAR; INTRAVENOUS at 10:40

## 2024-11-07 RX ADMIN — SALINE NASAL SPRAY 2 SPRAY: 1.5 SOLUTION NASAL at 21:34

## 2024-11-07 RX ADMIN — ENOXAPARIN SODIUM 60 MG: 100 INJECTION SUBCUTANEOUS at 21:33

## 2024-11-07 RX ADMIN — MUPIROCIN 1 APPLICATION: 20 OINTMENT TOPICAL at 21:33

## 2024-11-07 RX ADMIN — HYDRALAZINE HYDROCHLORIDE 25 MG: 25 TABLET ORAL at 21:33

## 2024-11-07 RX ADMIN — MUPIROCIN 1 APPLICATION: 20 OINTMENT TOPICAL at 08:00

## 2024-11-07 RX ADMIN — NICARDIPINE HYDROCHLORIDE 7.5 MG/HR: 25 INJECTION, SOLUTION INTRAVENOUS at 14:56

## 2024-11-07 RX ADMIN — AMLODIPINE BESYLATE 10 MG: 10 TABLET ORAL at 08:00

## 2024-11-07 RX ADMIN — CEFAZOLIN 2000 MG: 2 INJECTION, POWDER, FOR SOLUTION INTRAMUSCULAR; INTRAVENOUS at 03:02

## 2024-11-07 RX ADMIN — NICARDIPINE HYDROCHLORIDE 5 MG/HR: 25 INJECTION, SOLUTION INTRAVENOUS at 06:40

## 2024-11-07 RX ADMIN — NICARDIPINE HYDROCHLORIDE 5 MG/HR: 25 INJECTION, SOLUTION INTRAVENOUS at 02:06

## 2024-11-07 RX ADMIN — SALINE NASAL SPRAY 2 SPRAY: 1.5 SOLUTION NASAL at 10:00

## 2024-11-07 RX ADMIN — NICARDIPINE HYDROCHLORIDE 5 MG/HR: 25 INJECTION, SOLUTION INTRAVENOUS at 10:36

## 2024-11-07 RX ADMIN — SALINE NASAL SPRAY 2 SPRAY: 1.5 SOLUTION NASAL at 12:16

## 2024-11-07 RX ADMIN — Medication 2 PACKET: at 07:50

## 2024-11-07 RX ADMIN — DESMOPRESSIN ACETATE 1 MCG: 4 INJECTION, SOLUTION INTRAVENOUS; SUBCUTANEOUS at 12:15

## 2024-11-07 RX ADMIN — HYDRALAZINE HYDROCHLORIDE 25 MG: 25 TABLET ORAL at 13:23

## 2024-11-07 NOTE — PROGRESS NOTES
[facility] Progress Note      Patient Name: Johnny Duran  : 1990  MRN: [id number]  Primary Care Physician:  Edouard Vazquez MD  Date of admission: 2024    Subjective     Interval History:   Follow-up probable central DI    Patient has decreased urine output with the desmopressin sodium remains stable and his urine specific gravity has improved.  No chest pain or shortness of air, no orthopnea or PND.    Review of Systems:   As noted above    Objective     Vitals:   Temp:  [97.8 °F (36.6 °C)-99.3 °F (37.4 °C)] 97.8 °F (36.6 °C)  Heart Rate:  [] 96  BP: (112-153)/(65-85) 153/76  Flow (L/min) (Oxygen Therapy):  [12] 12    Intake/Output Summary (Last 24 hours) at 2024 0850  Last data filed at 2024 0759  Gross per 24 hour   Intake 1971 ml   Output 2560 ml   Net -589 ml       Physical Exam:    General Appearance: alert, awake, morbidly obese, chronic, no acute distress   Skin: warm and dry  HEENT: oral mucosa normal, nonicteric sclera  Neck: No JVD  Lungs: CTA, unlabored breathing effort  Heart: RRR, no rub  Abdomen: soft, nontender, nondistended  : no palpable bladder  Extremities: no edema, cyanosis or clubbing  Neuro: normal speech and mental status     Scheduled Meds:     amLODIPine, 10 mg, Oral, Q24H  ceFAZolin, 2,000 mg, Intravenous, Q8H  desmopressin, 1 mcg, Subcutaneous, Q8H  enoxaparin, 60 mg, Subcutaneous, BID  mupirocin, 1 Application, Each Nare, BID  senna-docusate sodium, 2 tablet, Oral, BID  sodium chloride, 2 spray, Each Nare, Q2H While Awake      IV Meds:   niCARdipine, 5-15 mg/hr, Last Rate: 5 mg/hr (24 0640)        Results Reviewed:   I have personally reviewed the results from the time of this admission to 2024 08:50 EST     Results from last 7 days   Lab Units 24  0559 24  2309 24  1838 24  1158 24  0506 24  2300 24  2037 24  1314   SODIUM mmol/L 136  136 139 140   < > 142   142 141  140   < > 143   POTASSIUM mmol/L 3.9  --   --   --  4.2 4.1  --  4.0   CHLORIDE mmol/L 101  --   --   --  106 106  --  106   CO2 mmol/L 29.7*  --   --   --  26.6 24.8  --  25.7   BUN mg/dL 9  --   --   --  6 7  --  9   CREATININE mg/dL 0.38*  --   --   --  0.49* 0.51*  --  0.74*   CALCIUM mg/dL 8.4*  --   --   --  8.4* 8.7  --  8.4*   BILIRUBIN mg/dL 0.3  --   --   --  <0.2  --   --  <0.2   ALK PHOS U/L 94  --   --   --  92  --   --  107   ALT (SGPT) U/L 10  --   --   --  12  --   --  14   AST (SGOT) U/L 12  --   --   --  14  --   --  14   GLUCOSE mg/dL 108*  --   --   --  137* 153*  --  177*    < > = values in this interval not displayed.       Estimated Creatinine Clearance: 569.5 mL/min (A) (by C-G formula based on SCr of 0.38 mg/dL (L)).    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    PLAN:  Decrease desmopressin to 1 mcg twice daily  Surveillance labs    I reviewed the chart and other providers notes, reviewed labs.  I discussed the case with the patient and his mother at the bedside also with his nurse  Copied text in this note has been reviewed and is accurate as of 11/07/24.       Thank you for involving us in the care of Johnny Duran.  Please feel free to call with any questions.    Rene Garcia MD  11/07/24  08:50  EST    [facility]  [phone]    Please note that portions of this note were completed with a voice recognition program.

## 2024-11-07 NOTE — PROGRESS NOTES
[facility] NEUROSURGERY INTRACRANIAL POSTOP note    PATIENT IDENTIFICATION:   Name:  Johnny Duran      MRN:  [id number]     34 y.o.  male               CC:POD #2 s/p transsphenoidal resection of pituitary adenoma with intraoperative CSF leak with repair using harvesting of fascia lotta and fat tissue graft from right leg      Subjective     Interval History: No acute issues overnight.  Vital signs stable with one episode of low-grade temperature of 99.3.  WBC count trending down.  3 point decrease in sodium this a.m.  Nephrology following and plans to decrease desmopressin dosage.  Hourly urine output ranging from 60 cc an hour to 250 cc an hour but generally remaining well below 200 cc an hour.      Objective     Vital signs in last 24 hours:  Temp:  [97.8 °F (36.6 °C)-99.3 °F (37.4 °C)] 97.8 °F (36.6 °C)  Heart Rate:  [] 96  BP: (112-153)/(65-85) 153/76      Intake/Output this shift:  I/O this shift:  In: -   Out: 375 [Urine:350; Drains:25]    Intake/Output last 3 shifts:  I/O last 3 completed shifts:  In: 3290 [P.O.:300; I.V.:2990]  Out: 6405 [Urine:6375; Drains:30]    MARK drain: 25 cc / 24 hrs    LABS:  .  Results from last 7 days   Lab Units 11/07/24  0559 11/06/24  0505 11/05/24  1314   WBC 10*3/mm3 11.60* 15.40* 8.73   HEMOGLOBIN g/dL 9.4* 9.6* 10.4*   HEMATOCRIT % 29.5* 30.9* 32.6*   PLATELETS 10*3/mm3 350 390 382     .  Results from last 7 days   Lab Units 11/07/24  0559   SODIUM mmol/L 136  136   POTASSIUM mmol/L 3.9   CHLORIDE mmol/L 101   CO2 mmol/L 29.7*   BUN mg/dL 9   CREATININE mg/dL 0.38*   GLUCOSE mg/dL 108*   CALCIUM mg/dL 8.4*      Latest Reference Range & Units 11/07/24 05:58   Osmolality 275 - 300 mOsm/kg 286      Latest Reference Range & Units 11/07/24 05:59   Cortisol mcg/dL 12.70      Latest Reference Range & Units 11/07/24 05:24   Specific Gravity, UA 1.005 - 1.030  1.021       IMAGING STUDIES:  No new neuroimaging.  Unable to obtain MRI    I personally viewed and interpreted the  patient's labs, medications and chart.    Meds reviewed/changed: Yes    Current Facility-Administered Medications:     acetaminophen (TYLENOL) tablet 650 mg, 650 mg, Oral, Q4H PRN **OR** acetaminophen (TYLENOL) 160 MG/5ML oral solution 650 mg, 650 mg, Oral, Q4H PRN **OR** acetaminophen (TYLENOL) suppository 650 mg, 650 mg, Rectal, Q4H PRN, Juan Frank MD    acetaminophen (TYLENOL) tablet 650 mg, 650 mg, Oral, Q4H PRN **OR** acetaminophen (TYLENOL) suppository 650 mg, 650 mg, Rectal, Q4H PRN, Meek Iverson MD    aluminum-magnesium hydroxide-simethicone (MAALOX MAX) 400-400-40 MG/5ML suspension 15 mL, 15 mL, Oral, Q6H PRN, Meek Iverson MD    amLODIPine (NORVASC) tablet 10 mg, 10 mg, Oral, Q24H, Delfino Coles MD, 10 mg at 11/07/24 0800    sennosides-docusate (PERICOLACE) 8.6-50 MG per tablet 2 tablet, 2 tablet, Oral, BID **AND** polyethylene glycol (MIRALAX) packet 17 g, 17 g, Oral, Daily PRN **AND** bisacodyl (DULCOLAX) EC tablet 5 mg, 5 mg, Oral, Daily PRN **AND** bisacodyl (DULCOLAX) suppository 10 mg, 10 mg, Rectal, Daily PRN, Juan Frank MD    Calcium Replacement - Follow Nurse / BPA Driven Protocol, , Does not apply, PRN, Meek Iverson MD    ceFAZolin 2000 mg IVPB in 100 mL NS (MBP), 2,000 mg, Intravenous, Q8H, Juan Frank MD, 2,000 mg at 11/07/24 0302    desmopressin (DDAVP) injection 1 mcg, 1 mcg, Subcutaneous, Q8H, Rene Garcia MD    Enoxaparin Sodium (LOVENOX) syringe 60 mg, 60 mg, Subcutaneous, BID, Juan Frank MD    HYDROcodone-acetaminophen (NORCO) 5-325 MG per tablet 1 tablet, 1 tablet, Oral, Q4H PRN, Juan Frank MD    Magnesium Standard Dose Replacement - Follow Nurse / BPA Driven Protocol, , Does not apply, PRN, Meek Iverson MD    mupirocin (BACTROBAN) 2 % nasal ointment 1 Application, 1 Application, Each Nare, BID, Meek Iverson MD, 1 Application at 11/07/24 0800    niCARdipine (CARDENE) 25 mg in 250 mL NS infusion kit, 5-15 mg/hr, Intravenous,  Titrated, Juan Frank MD, Last Rate: 50 mL/hr at 11/07/24 0640, 5 mg/hr at 11/07/24 0640    nitroglycerin (NITROSTAT) SL tablet 0.4 mg, 0.4 mg, Sublingual, Q5 Min PRN, Meek Iverson MD    ondansetron ODT (ZOFRAN-ODT) disintegrating tablet 4 mg, 4 mg, Oral, Q6H PRN **OR** ondansetron (ZOFRAN) injection 4 mg, 4 mg, Intravenous, Q6H PRN, Juan Frank MD    ondansetron ODT (ZOFRAN-ODT) disintegrating tablet 4 mg, 4 mg, Oral, Q6H PRN **OR** ondansetron (ZOFRAN) injection 4 mg, 4 mg, Intravenous, Q6H PRN, Meek Iverson MD    Phosphorus Replacement - Follow Nurse / BPA Driven Protocol, , Does not apply, PRN, Meek Iverson MD    Potassium Replacement - Follow Nurse / BPA Driven Protocol, , Does not apply, Zayra BRICENO Sandeep K, MD    sodium chloride nasal spray 2 spray, 2 spray, Each Nare, Q2H While Awake, Juan Frank MD, 2 spray at 11/07/24 0750  Desmopressin 20 mcg IV piggyback given at 0122 on 11/6/2024.  Scheduled for 2 mcg of desmopressin every 8 hours starting at 1145 today  On nicardipine drip  Physical Exam:    General:   Awake, alert, oriented x3. Speech clear with no aphasia  HEENT:    Nasal packing in place.  No drainage from nares.  Neck:    supple  CN II:    Visual fields full to confrontation  CN III IV VI:   Extraocular movements are full , PERRL 4 mm bilaterally and brisk to light  CN VII:    Facial movements are symmetric. No weakness.  Motor:    Normal muscle strength, bulk and tone in upper and lower extremities.  No fasciculations, rigidity, spasticity, or abnormal movements.  Reflexes:   Plantar responses are flexor.   Sensation:   Normal to light touch; no extinction  Station and Gait:  Gait deferred  Coordination:   Finger-to-nose and heel-to-shin test shows no dysmetria.  Extremities:   Wearing SCD.  Right lateral lower extremity incision with sutures in place and MARK drain draining sanguinous fluid.    Assessment & Plan     ASSESSMENT:      Pituitary macroadenoma with extrasellar  extension    34-year-old male with prolactinoma day #2 s/p transsphenoidal resection of pituitary adenoma.  He experienced an intraoperative CSF leak with repair using harvesting of fascia lotta and fat tissue graft from right leg.  Currently in expected postop surgical phase of diabetes insipidus being managed with desmopressin.  Neurologic exam is stable today with no clinical signs of CSF leak.  He denies vision change or headaches.  We will keep him strictly head of bed at 30 degrees until tomorrow and then start mobilizing.  Will keep Scott catheter in until tomorrow as well-continue hourly urine output measurements.  We can remove the right leg MARK drain today as there was minimal output in the past 24 hours.   Urine output ranging between 60 cc to 215 cc an hour overnight.  Nephrology has decreased desmopressin dosing from 2 mcg to 1 mcg subcutaneous every 8 hours.  We appreciate nephrology's input.     Unfortunately he was unable to obtain MRI due to body habitus.  We will schedule an outpatient pituitary MRI with and without contrast in the outpatient setting using an open MRI.    He will receive last dose of IV Ancef today and start IV Unasyn for a 2-day course.  Once completed he will start a 2-week course of p.o. Augmentin.    Please notify neurosurgery for 6 point increase or decrease from baseline serum sodium, urine specific gravity less than 1.005 and urine output greater than 250 cc/hour for 3 consecutive hours.    PLAN:     Continue head of bed 30 degrees  Will obtain MRI pituitary in outpatient setting  Remove MARK drain  As needed bowel regimen  As needed pain meds  SCD's  Lovenox for VTE prophylaxis  Observe for signs of CSF leak, vision change, headaches, other neuro change  Serial labs  Maintain SBP < 140  Nephrology following  Start IV Unasyn today    I discussed the patient's findings and my recommendations with patient, family, nursing staff, and Dr. Frank.    During patient visit, I utilized  "appropriate personal protective equipment including mask and gloves.  Mask used was standard procedure mask. Appropriate PPE was worn during the entire visit.  Hand hygiene was completed before and after.      LOS: 2 days       Trent Acuna, APRN 11/7/2024  08:48 EST    \"Dictated utilizing Dragon dictation\".    "

## 2024-11-07 NOTE — PROGRESS NOTES
[facility] INPATIENT PROGRESS NOTE         [facility] INTENSIVE CARE    2024      PATIENT IDENTIFICATION:  Name: Johnny Duran ADMIT: 2024   : 1990  PCP: Edouard Vazquez MD    MRN: [id number] LOS: 2 days   AGE/SEX: 34 y.o. male  ROOM: [facility]                     LOS 2    Reason for visit: Postop pituitary macroadenoma resection      SUBJECTIVE:      Resting comfortably.  Pain well-controlled.  Hemodynamically stable.      Objective   OBJECTIVE:    Vital Sign Min/Max for last 24 hours  Temp  Min: 97.8 °F (36.6 °C)  Max: 99.3 °F (37.4 °C)   BP  Min: 112/69  Max: 153/76   Pulse  Min: 89  Max: 110   No data recorded   SpO2  Min: 93 %  Max: 100 %   No data recorded   Weight  Min: 245 kg (540 lb)  Max: 245 kg (540 lb)    Vitals:    24 0815 24 0830 24 0845 24 0900   BP: 134/79 153/76 143/82 129/73   Pulse: 92 96 95 92   Resp:       Temp:       TempSrc:       SpO2: 95% 95% 96% 98%   Weight:                24  2100 24  0522 24  0525   Weight: (!) 242 kg (533 lb) (!) 242 kg (533 lb) (!) 245 kg (540 lb)       Body mass index is 70.28 kg/m².                          Body mass index is 70.28 kg/m².    Intake/Output Summary (Last 24 hours) at 2024 09  Last data filed at 2024 0900  Gross per 24 hour   Intake 2211 ml   Output 2635 ml   Net -424 ml         Exam:  GEN:  No distress, appears stated age  EYES:   PERRL, anicteric sclerae  ENT:    External ears/nose normal, OP clear  NECK:  No adenopathy, midline trachea  LUNGS: Normal chest on inspection, palpation and auscultation  CV:  Normal S1S2, without murmur  ABD:  Nontender, nondistended, no hepatosplenomegaly, +BS  EXT:  No edema.  No cyanosis or clubbing.  No mottling and normal cap refill.    Assessment     Scheduled meds:  amLODIPine, 10 mg, Oral, Q24H  ceFAZolin, 2,000 mg, Intravenous, Q8H  desmopressin, 1 mcg, Subcutaneous, Q8H  enoxaparin, 60 mg, Subcutaneous, BID  mupirocin, 1  Application, Each Nare, BID  senna-docusate sodium, 2 tablet, Oral, BID  sodium chloride, 2 spray, Each Nare, Q2H While Awake      IV meds:                      niCARdipine, 5-15 mg/hr, Last Rate: 5 mg/hr (11/07/24 0640)      Data Review:  Results from last 7 days   Lab Units 11/07/24 0559 11/06/24  2309 11/06/24  1838 11/06/24  1158 11/06/24  0506 11/05/24  2300 11/05/24  2037 11/05/24  1314   SODIUM mmol/L 136  136 139 140 141 142  142 141  140   < > 143   POTASSIUM mmol/L 3.9  --   --   --  4.2 4.1  --  4.0   CHLORIDE mmol/L 101  --   --   --  106 106  --  106   CO2 mmol/L 29.7*  --   --   --  26.6 24.8  --  25.7   BUN mg/dL 9  --   --   --  6 7  --  9   CREATININE mg/dL 0.38*  --   --   --  0.49* 0.51*  --  0.74*   GLUCOSE mg/dL 108*  --   --   --  137* 153*  --  177*   CALCIUM mg/dL 8.4*  --   --   --  8.4* 8.7  --  8.4*    < > = values in this interval not displayed.         Estimated Creatinine Clearance: 569.5 mL/min (A) (by C-G formula based on SCr of 0.38 mg/dL (L)).  Results from last 7 days   Lab Units 11/07/24 0559 11/06/24  0505 11/05/24  1314   WBC 10*3/mm3 11.60* 15.40* 8.73   HEMOGLOBIN g/dL 9.4* 9.6* 10.4*   PLATELETS 10*3/mm3 350 390 382         Results from last 7 days   Lab Units 11/07/24 0559 11/06/24  0506 11/05/24  1314   ALT (SGPT) U/L 10 12 14   AST (SGOT) U/L 12 14 14                 Glucose   Date/Time Value Ref Range Status   11/07/2024 0701 106 70 - 130 mg/dL Final   11/07/2024 0417 102 70 - 130 mg/dL Final   11/06/2024 2014 115 70 - 130 mg/dL Final   11/06/2024 0504 140 (H) 70 - 130 mg/dL Final   11/06/2024 0055 137 (H) 70 - 130 mg/dL Final   11/05/2024 1744 164 (H) 70 - 130 mg/dL Final                 Active Hospital Problems    Diagnosis  POA    **Pituitary macroadenoma with extrasellar extension [D35.2]  Unknown      Resolved Hospital Problems   No resolved problems to display.         ASSESSMENT:  Pituitary macroadenoma status post transsphenoidal resection (11/5)  Diabetes  insipidus  Super morbid obesity (BMI 67)  Hyperglycemia  Anemia      PLAN:  Neurochecks per protocol.  Desmopressin per nephrology orders Q8.    Cardene for blood pressure control and increasing oral antihypertensives.  Control glucose.  DVT prophylaxis.    Discussed with multidisciplinary ICU team on rounds this morning.      CCT: 32 min    Delfino Coles MD  Pulmonary and Critical Care Medicine  [facility], [facility]  11/7/2024    09:23 EST

## 2024-11-07 NOTE — PLAN OF CARE
Goal Outcome Evaluation:           Progress: improving   Pt VSS. Pt neuro status intact and unchanged. Pt remains on Cardene to maintain sys<140. Pt has no complaints of pain throughout shift. Urine output at goal <200ml/hr.

## 2024-11-08 LAB
ALBUMIN SERPL-MCNC: 3.5 G/DL (ref 3.5–5.2)
ALBUMIN/GLOB SERPL: 1.3 G/DL
ALP SERPL-CCNC: 102 U/L (ref 39–117)
ALT SERPL W P-5'-P-CCNC: 11 U/L (ref 1–41)
ANION GAP SERPL CALCULATED.3IONS-SCNC: 11 MMOL/L (ref 5–15)
AST SERPL-CCNC: 9 U/L (ref 1–40)
BASOPHILS # BLD AUTO: 0.03 10*3/MM3 (ref 0–0.2)
BASOPHILS NFR BLD AUTO: 0.3 % (ref 0–1.5)
BILIRUB SERPL-MCNC: 0.4 MG/DL (ref 0–1.2)
BUN SERPL-MCNC: 11 MG/DL (ref 6–20)
BUN/CREAT SERPL: 27.5 (ref 7–25)
CALCIUM SPEC-SCNC: 8.3 MG/DL (ref 8.6–10.5)
CHLORIDE SERPL-SCNC: 99 MMOL/L (ref 98–107)
CO2 SERPL-SCNC: 28 MMOL/L (ref 22–29)
CORTIS SERPL-MCNC: 7.33 MCG/DL
CREAT SERPL-MCNC: 0.4 MG/DL (ref 0.76–1.27)
DEPRECATED RDW RBC AUTO: 40.4 FL (ref 37–54)
EGFRCR SERPLBLD CKD-EPI 2021: 146.8 ML/MIN/1.73
EOSINOPHIL # BLD AUTO: 0.21 10*3/MM3 (ref 0–0.4)
EOSINOPHIL NFR BLD AUTO: 1.9 % (ref 0.3–6.2)
ERYTHROCYTE [DISTWIDTH] IN BLOOD BY AUTOMATED COUNT: 13.1 % (ref 12.3–15.4)
GLOBULIN UR ELPH-MCNC: 2.7 GM/DL
GLUCOSE BLDC GLUCOMTR-MCNC: 100 MG/DL (ref 70–130)
GLUCOSE BLDC GLUCOMTR-MCNC: 102 MG/DL (ref 70–130)
GLUCOSE BLDC GLUCOMTR-MCNC: 109 MG/DL (ref 70–130)
GLUCOSE BLDC GLUCOMTR-MCNC: 120 MG/DL (ref 70–130)
GLUCOSE BLDC GLUCOMTR-MCNC: 92 MG/DL (ref 70–130)
GLUCOSE BLDC GLUCOMTR-MCNC: 97 MG/DL (ref 70–130)
GLUCOSE SERPL-MCNC: 96 MG/DL (ref 65–99)
HCT VFR BLD AUTO: 28.5 % (ref 37.5–51)
HGB BLD-MCNC: 9.3 G/DL (ref 13–17.7)
IMM GRANULOCYTES # BLD AUTO: 0.07 10*3/MM3 (ref 0–0.05)
IMM GRANULOCYTES NFR BLD AUTO: 0.6 % (ref 0–0.5)
LYMPHOCYTES # BLD AUTO: 2 10*3/MM3 (ref 0.7–3.1)
LYMPHOCYTES NFR BLD AUTO: 17.9 % (ref 19.6–45.3)
MAGNESIUM SERPL-MCNC: 2.1 MG/DL (ref 1.6–2.6)
MCH RBC QN AUTO: 27.8 PG (ref 26.6–33)
MCHC RBC AUTO-ENTMCNC: 32.6 G/DL (ref 31.5–35.7)
MCV RBC AUTO: 85.1 FL (ref 79–97)
MONOCYTES # BLD AUTO: 0.77 10*3/MM3 (ref 0.1–0.9)
MONOCYTES NFR BLD AUTO: 6.9 % (ref 5–12)
NEUTROPHILS NFR BLD AUTO: 72.4 % (ref 42.7–76)
NEUTROPHILS NFR BLD AUTO: 8.09 10*3/MM3 (ref 1.7–7)
NRBC BLD AUTO-RTO: 0 /100 WBC (ref 0–0.2)
OSMOLALITY SERPL: 282 MOSM/KG (ref 275–300)
OSMOLALITY SERPL: 283 MOSM/KG (ref 275–300)
OSMOLALITY SERPL: 284 MOSM/KG (ref 275–300)
OSMOLALITY SERPL: 286 MOSM/KG (ref 275–300)
PHOSPHATE SERPL-MCNC: 2.6 MG/DL (ref 2.5–4.5)
PLATELET # BLD AUTO: 351 10*3/MM3 (ref 140–450)
PMV BLD AUTO: 9.6 FL (ref 6–12)
POTASSIUM SERPL-SCNC: 3.7 MMOL/L (ref 3.5–5.2)
PROT SERPL-MCNC: 6.2 G/DL (ref 6–8.5)
RBC # BLD AUTO: 3.35 10*6/MM3 (ref 4.14–5.8)
SODIUM SERPL-SCNC: 136 MMOL/L (ref 136–145)
SODIUM SERPL-SCNC: 137 MMOL/L (ref 136–145)
SODIUM SERPL-SCNC: 138 MMOL/L (ref 136–145)
SODIUM SERPL-SCNC: 138 MMOL/L (ref 136–145)
SODIUM SERPL-SCNC: 139 MMOL/L (ref 136–145)
SP GR UR STRIP: 1.01 (ref 1–1.03)
SP GR UR STRIP: 1.01 (ref 1–1.03)
SP GR UR STRIP: 1.02 (ref 1–1.03)
SP GR UR STRIP: 1.02 (ref 1–1.03)
URATE SERPL-MCNC: 3.7 MG/DL (ref 3.4–7)
WBC NRBC COR # BLD AUTO: 11.17 10*3/MM3 (ref 3.4–10.8)

## 2024-11-08 PROCEDURE — 83735 ASSAY OF MAGNESIUM: CPT | Performed by: HOSPITALIST

## 2024-11-08 PROCEDURE — 99024 POSTOP FOLLOW-UP VISIT: CPT | Performed by: NURSE PRACTITIONER

## 2024-11-08 PROCEDURE — 84295 ASSAY OF SERUM SODIUM: CPT | Performed by: NEUROLOGICAL SURGERY

## 2024-11-08 PROCEDURE — 83930 ASSAY OF BLOOD OSMOLALITY: CPT | Performed by: NEUROLOGICAL SURGERY

## 2024-11-08 PROCEDURE — 82948 REAGENT STRIP/BLOOD GLUCOSE: CPT

## 2024-11-08 PROCEDURE — 25810000003 SODIUM CHLORIDE 0.9 % SOLUTION: Performed by: NEUROLOGICAL SURGERY

## 2024-11-08 PROCEDURE — 25010000002 AMPICILLIN-SULBACTAM PER 1.5 G

## 2024-11-08 PROCEDURE — 25010000002 DESMOPRESSIN ACETATE PF 4 MCG/ML SOLUTION: Performed by: INTERNAL MEDICINE

## 2024-11-08 PROCEDURE — 25010000002 NICARDIPINE 2.5 MG/ML SOLUTION: Performed by: NEUROLOGICAL SURGERY

## 2024-11-08 PROCEDURE — 94799 UNLISTED PULMONARY SVC/PX: CPT

## 2024-11-08 PROCEDURE — 25010000002 ENOXAPARIN PER 10 MG: Performed by: NEUROLOGICAL SURGERY

## 2024-11-08 PROCEDURE — 94761 N-INVAS EAR/PLS OXIMETRY MLT: CPT

## 2024-11-08 PROCEDURE — 81003 URINALYSIS AUTO W/O SCOPE: CPT | Performed by: NEUROLOGICAL SURGERY

## 2024-11-08 PROCEDURE — 84100 ASSAY OF PHOSPHORUS: CPT | Performed by: INTERNAL MEDICINE

## 2024-11-08 PROCEDURE — 80053 COMPREHEN METABOLIC PANEL: CPT | Performed by: NEUROLOGICAL SURGERY

## 2024-11-08 PROCEDURE — 82533 TOTAL CORTISOL: CPT | Performed by: NEUROLOGICAL SURGERY

## 2024-11-08 PROCEDURE — 84550 ASSAY OF BLOOD/URIC ACID: CPT | Performed by: INTERNAL MEDICINE

## 2024-11-08 PROCEDURE — 85025 COMPLETE CBC W/AUTO DIFF WBC: CPT | Performed by: HOSPITALIST

## 2024-11-08 RX ORDER — METOPROLOL TARTRATE 50 MG
50 TABLET ORAL EVERY 12 HOURS SCHEDULED
Status: DISCONTINUED | OUTPATIENT
Start: 2024-11-08 | End: 2024-11-12 | Stop reason: HOSPADM

## 2024-11-08 RX ORDER — HYDRALAZINE HYDROCHLORIDE 50 MG/1
50 TABLET, FILM COATED ORAL EVERY 8 HOURS SCHEDULED
Status: DISCONTINUED | OUTPATIENT
Start: 2024-11-08 | End: 2024-11-12 | Stop reason: HOSPADM

## 2024-11-08 RX ADMIN — SALINE NASAL SPRAY 2 SPRAY: 1.5 SOLUTION NASAL at 20:13

## 2024-11-08 RX ADMIN — SENNOSIDES AND DOCUSATE SODIUM 2 TABLET: 50; 8.6 TABLET ORAL at 08:33

## 2024-11-08 RX ADMIN — SALINE NASAL SPRAY 2 SPRAY: 1.5 SOLUTION NASAL at 17:55

## 2024-11-08 RX ADMIN — DESMOPRESSIN ACETATE 1 MCG: 4 INJECTION, SOLUTION INTRAVENOUS; SUBCUTANEOUS at 12:14

## 2024-11-08 RX ADMIN — SALINE NASAL SPRAY 2 SPRAY: 1.5 SOLUTION NASAL at 12:14

## 2024-11-08 RX ADMIN — AMPICILLIN SODIUM AND SULBACTAM SODIUM 3 G: 2; 1 INJECTION, POWDER, FOR SOLUTION INTRAMUSCULAR; INTRAVENOUS at 23:46

## 2024-11-08 RX ADMIN — SENNOSIDES AND DOCUSATE SODIUM 2 TABLET: 50; 8.6 TABLET ORAL at 20:13

## 2024-11-08 RX ADMIN — AMPICILLIN SODIUM AND SULBACTAM SODIUM 3 G: 2; 1 INJECTION, POWDER, FOR SOLUTION INTRAMUSCULAR; INTRAVENOUS at 18:49

## 2024-11-08 RX ADMIN — NICARDIPINE HYDROCHLORIDE 7.5 MG/HR: 25 INJECTION, SOLUTION INTRAVENOUS at 00:53

## 2024-11-08 RX ADMIN — HYDRALAZINE HYDROCHLORIDE 50 MG: 50 TABLET ORAL at 14:03

## 2024-11-08 RX ADMIN — ENOXAPARIN SODIUM 60 MG: 100 INJECTION SUBCUTANEOUS at 20:13

## 2024-11-08 RX ADMIN — METOPROLOL TARTRATE 50 MG: 50 TABLET, FILM COATED ORAL at 08:57

## 2024-11-08 RX ADMIN — SALINE NASAL SPRAY 2 SPRAY: 1.5 SOLUTION NASAL at 14:03

## 2024-11-08 RX ADMIN — MUPIROCIN 1 APPLICATION: 20 OINTMENT TOPICAL at 08:34

## 2024-11-08 RX ADMIN — METOPROLOL TARTRATE 50 MG: 50 TABLET, FILM COATED ORAL at 20:13

## 2024-11-08 RX ADMIN — NICARDIPINE HYDROCHLORIDE 10 MG/HR: 25 INJECTION, SOLUTION INTRAVENOUS at 09:17

## 2024-11-08 RX ADMIN — NICARDIPINE HYDROCHLORIDE 12.5 MG/HR: 25 INJECTION, SOLUTION INTRAVENOUS at 05:09

## 2024-11-08 RX ADMIN — DESMOPRESSIN ACETATE 1 MCG: 4 INJECTION, SOLUTION INTRAVENOUS; SUBCUTANEOUS at 23:47

## 2024-11-08 RX ADMIN — SALINE NASAL SPRAY 2 SPRAY: 1.5 SOLUTION NASAL at 11:06

## 2024-11-08 RX ADMIN — HYDRALAZINE HYDROCHLORIDE 25 MG: 25 TABLET ORAL at 05:54

## 2024-11-08 RX ADMIN — SALINE NASAL SPRAY 2 SPRAY: 1.5 SOLUTION NASAL at 06:02

## 2024-11-08 RX ADMIN — AMLODIPINE BESYLATE 10 MG: 10 TABLET ORAL at 08:33

## 2024-11-08 RX ADMIN — SALINE NASAL SPRAY 2 SPRAY: 1.5 SOLUTION NASAL at 22:12

## 2024-11-08 RX ADMIN — NICARDIPINE HYDROCHLORIDE 12.5 MG/HR: 25 INJECTION, SOLUTION INTRAVENOUS at 07:02

## 2024-11-08 RX ADMIN — HYDRALAZINE HYDROCHLORIDE 50 MG: 50 TABLET ORAL at 23:18

## 2024-11-08 RX ADMIN — NICARDIPINE HYDROCHLORIDE 12.5 MG/HR: 25 INJECTION, SOLUTION INTRAVENOUS at 03:22

## 2024-11-08 RX ADMIN — MUPIROCIN 1 APPLICATION: 20 OINTMENT TOPICAL at 21:03

## 2024-11-08 RX ADMIN — ENOXAPARIN SODIUM 60 MG: 100 INJECTION SUBCUTANEOUS at 08:34

## 2024-11-08 RX ADMIN — AMPICILLIN SODIUM AND SULBACTAM SODIUM 3 G: 2; 1 INJECTION, POWDER, FOR SOLUTION INTRAMUSCULAR; INTRAVENOUS at 05:54

## 2024-11-08 RX ADMIN — SALINE NASAL SPRAY 2 SPRAY: 1.5 SOLUTION NASAL at 08:34

## 2024-11-08 RX ADMIN — AMPICILLIN SODIUM AND SULBACTAM SODIUM 3 G: 2; 1 INJECTION, POWDER, FOR SOLUTION INTRAMUSCULAR; INTRAVENOUS at 12:13

## 2024-11-08 NOTE — CASE MANAGEMENT/SOCIAL WORK
Continued Stay Note  [facility]     Patient Name: Johnny Duran  MRN: [id number]  Today's Date: 11/8/2024    Admit Date: 11/5/2024    Plan: Return home alone, family to transport   Discharge Plan       Row Name 11/08/24 1445       Plan    Plan Return home alone, family to transport    Patient/Family in Agreement with Plan yes    Plan Comments The patient's discharge plan remains the same; return home alone, family to transport. CCP to follow. CD, CSW.                   Discharge Codes    No documentation.                 Expected Discharge Date and Time       Expected Discharge Date Expected Discharge Time    Nov 12, 2024

## 2024-11-08 NOTE — PROGRESS NOTES
[facility] NEUROSURGERY INTRACRANIAL POSTOP note    PATIENT IDENTIFICATION:   Name:  Johnny Duran      MRN:  [id number]     34 y.o.  male               CC:POD 3 transsphenoidal resection pituitary adenoma with muscle tensor fascia graft and right leg fat graft      Subjective     Interval History: No headache, has some nasal drainage but using nasal saline.  No visual changes.  Remains on Cardene.  Nephrology following-on DDAVP, Scott remains.    Objective     Vital signs in last 24 hours:  Temp:  [98.1 °F (36.7 °C)-98.5 °F (36.9 °C)] 98.3 °F (36.8 °C)  Heart Rate:  [] 104  Resp:  [20] 20  BP: (111-155)/(63-86) 130/67      Intake/Output this shift:  I/O this shift:  In: 605 [I.V.:505; IV Piggyback:100]  Out: 1825 [Urine:1825]    Intake/Output last 3 shifts:  I/O last 3 completed shifts:  In: 2663 [P.O.:540; I.V.:2123]  Out: 4570 [Urine:4545; Drains:25]    UOP-120-330 every 2 hours (except 1 episode 720 cc between 11 PM-1 AM)    LABS:  Results from last 7 days   Lab Units 11/08/24  0602 11/07/24  0559 11/06/24  0505   WBC 10*3/mm3 11.17* 11.60* 15.40*   HEMOGLOBIN g/dL 9.3* 9.4* 9.6*   HEMATOCRIT % 28.5* 29.5* 30.9*   PLATELETS 10*3/mm3 351 350 390     Results from last 7 days   Lab Units 11/08/24  1153 11/08/24  0602 11/08/24  0006 11/07/24  1244 11/07/24  0559 11/06/24  1158 11/06/24  0506   SODIUM mmol/L 139 138  138 137   < > 136  136   < > 142  142   POTASSIUM mmol/L  --  3.7  --   --  3.9  --  4.2   CHLORIDE mmol/L  --  99  --   --  101  --  106   CO2 mmol/L  --  28.0  --   --  29.7*  --  26.6   BUN mg/dL  --  11  --   --  9  --  6   CREATININE mg/dL  --  0.40*  --   --  0.38*  --  0.49*   CALCIUM mg/dL  --  8.3*  --   --  8.4*  --  8.4*   BILIRUBIN mg/dL  --  0.4  --   --  0.3  --  <0.2   ALK PHOS U/L  --  102  --   --  94  --  92   ALT (SGPT) U/L  --  11  --   --  10  --  12   AST (SGOT) U/L  --  9  --   --  12  --  14   GLUCOSE mg/dL  --  96  --   --  108*  --  137*    < > = values in this interval  not displayed.     Serum osmolarity 283 (284, 282, 280)  Serum sodium 139 (138, 138, 137)  Urine specific gravity 1.009 (1.017, 1.006, 1.017)   Cortisol level 7.33    Pathology-pituitary adenoma    IMAGING STUDIES:  No new imaging    I personally viewed and interpreted the patient's chart.    Meds reviewed/changed: Yes    Current Facility-Administered Medications:     acetaminophen (TYLENOL) tablet 650 mg, 650 mg, Oral, Q4H PRN **OR** acetaminophen (TYLENOL) 160 MG/5ML oral solution 650 mg, 650 mg, Oral, Q4H PRN **OR** acetaminophen (TYLENOL) suppository 650 mg, 650 mg, Rectal, Q4H PRN, Juan Frank MD    acetaminophen (TYLENOL) tablet 650 mg, 650 mg, Oral, Q4H PRN **OR** acetaminophen (TYLENOL) suppository 650 mg, 650 mg, Rectal, Q4H PRN, Meek Iverson MD    aluminum-magnesium hydroxide-simethicone (MAALOX MAX) 400-400-40 MG/5ML suspension 15 mL, 15 mL, Oral, Q6H PRN, Meek Iverson MD    amLODIPine (NORVASC) tablet 10 mg, 10 mg, Oral, Q24H, Delfino Coles MD, 10 mg at 11/08/24 0833    [START ON 11/10/2024] amoxicillin-clavulanate (AUGMENTIN) 875-125 MG per tablet 1 tablet, 1 tablet, Oral, Q12H, Justina Mcbride APRN    ampicillin-sulbactam (UNASYN) 3 g in sodium chloride 0.9 % 100 mL MBP, 3 g, Intravenous, Q6H, Trent Acuna APRN, 3 g at 11/08/24 1213    sennosides-docusate (PERICOLACE) 8.6-50 MG per tablet 2 tablet, 2 tablet, Oral, BID, 2 tablet at 11/08/24 0833 **AND** polyethylene glycol (MIRALAX) packet 17 g, 17 g, Oral, Daily PRN **AND** bisacodyl (DULCOLAX) EC tablet 5 mg, 5 mg, Oral, Daily PRN **AND** bisacodyl (DULCOLAX) suppository 10 mg, 10 mg, Rectal, Daily PRN, Juan Frank MD    Calcium Replacement - Follow Nurse / BPA Driven Protocol, , Does not apply, PRN, Meek Iverson MD    desmopressin (DDAVP) injection 1 mcg, 1 mcg, Subcutaneous, Q12H, Rene Garcia MD, 1 mcg at 11/08/24 1214    Enoxaparin Sodium (LOVENOX) syringe 60 mg, 60 mg, Subcutaneous, BID, Monika  Juan RIVERA MD, 60 mg at 11/08/24 0834    hydrALAZINE (APRESOLINE) tablet 50 mg, 50 mg, Oral, Q8H, Delfino Coles MD, 50 mg at 11/08/24 1403    HYDROcodone-acetaminophen (NORCO) 5-325 MG per tablet 1 tablet, 1 tablet, Oral, Q4H PRN, Juan Frank MD    Magnesium Standard Dose Replacement - Follow Nurse / BPA Driven Protocol, , Does not apply, PRN, Meek Iverson MD    metoprolol tartrate (LOPRESSOR) tablet 50 mg, 50 mg, Oral, Q12H, Delfino Coles MD, 50 mg at 11/08/24 0857    mupirocin (BACTROBAN) 2 % nasal ointment 1 Application, 1 Application, Each Nare, BID, Meek Iverson MD, 1 Application at 11/08/24 0834    niCARdipine (CARDENE) 25 mg in 250 mL NS infusion kit, 5-15 mg/hr, Intravenous, Titrated, Justina Mcbride, APRN, Stopped at 11/08/24 1149    nitroglycerin (NITROSTAT) SL tablet 0.4 mg, 0.4 mg, Sublingual, Q5 Min PRN, Meek Iverson MD    ondansetron ODT (ZOFRAN-ODT) disintegrating tablet 4 mg, 4 mg, Oral, Q6H PRN **OR** ondansetron (ZOFRAN) injection 4 mg, 4 mg, Intravenous, Q6H PRN, Juan Frank MD    ondansetron ODT (ZOFRAN-ODT) disintegrating tablet 4 mg, 4 mg, Oral, Q6H PRN **OR** ondansetron (ZOFRAN) injection 4 mg, 4 mg, Intravenous, Q6H PRN, Meek Iverson MD    Phosphorus Replacement - Follow Nurse / BPA Driven Protocol, , Does not apply, Zayra BRICENO Sandeep K, MD    Potassium Replacement - Follow Nurse / BPA Driven Protocol, , Does not apply, PRCASEY, Meek Iverson MD    sodium chloride nasal spray 2 spray, 2 spray, Each Nare, Q2H While Awake, Juan Frank MD, 2 spray at 11/08/24 1403      Physical Exam:    General:   Awake, alert, oriented x3. Speech clear with no aphasia  HEENT:    Blood-tinged nasal drainage from right naris.  Just received nasal wash  Neck:    supple  CN II:    Visual fields full to confrontation  CN III IV VI:   Extraocular movements are full , PERRL   C VII:    Facial movements are symmetric. No weakness.  Motor:    Moving all extremities.  No  drift  Station and Gait:  Bed rest  Extremities:   Wearing SCD.  Right lateral thigh incision well-approximated with no redness drainage or swelling.    Assessment & Plan     ASSESSMENT:      Pituitary macroadenoma with extrasellar extension    Patient with pituitary macroadenoma status post transsphenoidal resection with CSF leak repair by fat graft and muscle tensor fascia graft.  He is overall doing fairly well.  Denies any headache.  He has been on bedrest since surgery.  He does have some rhinorrhea but he is using the saline nasal spray which is likely what is resulting in the fluid leak from his back.  His visual fields are full and he denies visual complaints.  He is tolerating diet.  He is on Cardene for SBP less than 140, will relax these parameters to wean him off.  Nephrology service has been following closely for treatment of DI.  He is currently on desmopressin IV.  Urine outputs have overall remained within reasonable amount, but has had some off numbers.  Serum sodium, urine specific gravity WNL.  Will defer to nephrology regarding adjustment of desmopressin and when to DC the Scott.  He is on IV antibiotics and will transition to Augmentin on Sunday at the request of Dr. Murillo, ENT for infection prevention.  Okay for transfer to floor once he is off of Cardene.  Advised nursing to put him back to bed if headache presents itself with activity.    PLAN:   Okay for out of bed activity  Okay for transfer to floor  DC Scott if okay with nephrology  Continue strict I's/O especially UOP every 4 hour  Return to bed rest if begins with upright headache or rhinorrhea  Keep nasal packing x 3 weeks per ENT  Continue Unasyn x 2 days  Augmentin twice daily to begin 11/10 AM  Cardene, SBP<160  Will TTF once off Cardene      I discussed the patient's findings and my recommendations with patient, family, nursing staff, and Dr. Frank    During patient visit, I utilized appropriate personal protective equipment  "including  gloves.  Appropriate PPE was worn during the entire visit.  Hand hygiene was completed before and after.      LOS: 3 days       Justina Mcbride, APRN  11/8/2024  15:15 EST    \"Dictated utilizing Dragon dictation\".      "

## 2024-11-08 NOTE — PROGRESS NOTES
[facility] INPATIENT PROGRESS NOTE         [facility] INTENSIVE CARE    2024      PATIENT IDENTIFICATION:  Name: Johnny Duran ADMIT: 2024   : 1990  PCP: Edouard Vazquez MD    MRN: [id number] LOS: 3 days   AGE/SEX: 34 y.o. male  ROOM: [facility]                     LOS 3    Reason for visit: Postop pituitary macroadenoma resection      SUBJECTIVE:      Still on Cardene drip.  Increased oral blood pressure medicines yesterday.  Will add additional today.  No new issues overnight.      Objective   OBJECTIVE:    Vital Sign Min/Max for last 24 hours  Temp  Min: 97.9 °F (36.6 °C)  Max: 98.3 °F (36.8 °C)   BP  Min: 111/66  Max: 152/79   Pulse  Min: 84  Max: 109   Resp  Min: 20  Max: 20   SpO2  Min: 90 %  Max: 99 %   No data recorded   Weight  Min: 242 kg (534 lb 9.6 oz)  Max: 242 kg (534 lb 9.6 oz)    Vitals:    24 0530 24 0545 24 0600 24 0615   BP: 128/68 142/71 127/66 125/63   Pulse: 99 100 107 105   Resp:       Temp:       TempSrc:       SpO2: 93% 95% 96% 95%   Weight:   (!) 242 kg (534 lb 9.6 oz)             24  0522 24  0525 24  0600   Weight: (!) 242 kg (533 lb) (!) 245 kg (540 lb) (!) 242 kg (534 lb 9.6 oz)       Body mass index is 69.58 kg/m².                          Body mass index is 69.58 kg/m².    Intake/Output Summary (Last 24 hours) at 2024 0838  Last data filed at 2024 0600  Gross per 24 hour   Intake 1592 ml   Output 3235 ml   Net -1643 ml         Exam:  GEN:  No distress, appears stated age  EYES:   PERRL, anicteric sclerae  ENT:    External ears/nose normal, OP clear  NECK:  No adenopathy, midline trachea  LUNGS: Normal chest on inspection, palpation and auscultation  CV:  Normal S1S2, without murmur  ABD:  Nontender, nondistended, no hepatosplenomegaly, +BS  EXT:  No edema.  No cyanosis or clubbing.  No mottling and normal cap refill.    Assessment     Scheduled meds:  amLODIPine, 10 mg, Oral,  Q24H  ampicillin-sulbactam, 3 g, Intravenous, Q6H  desmopressin, 1 mcg, Subcutaneous, Q12H  enoxaparin, 60 mg, Subcutaneous, BID  hydrALAZINE, 25 mg, Oral, Q8H  mupirocin, 1 Application, Each Nare, BID  senna-docusate sodium, 2 tablet, Oral, BID  sodium chloride, 2 spray, Each Nare, Q2H While Awake      IV meds:                      niCARdipine, 5-15 mg/hr, Last Rate: 12.5 mg/hr (11/08/24 0702)      Data Review:  Results from last 7 days   Lab Units 11/08/24  0602 11/08/24  0006 11/07/24  1801 11/07/24  1244 11/07/24  0559 11/06/24  1158 11/06/24  0506 11/05/24  2300 11/05/24  2037 11/05/24  1314   SODIUM mmol/L 138  138 137 134* 137 136  136   < > 142  142 141  140   < > 143   POTASSIUM mmol/L 3.7  --   --   --  3.9  --  4.2 4.1  --  4.0   CHLORIDE mmol/L 99  --   --   --  101  --  106 106  --  106   CO2 mmol/L 28.0  --   --   --  29.7*  --  26.6 24.8  --  25.7   BUN mg/dL 11  --   --   --  9  --  6 7  --  9   CREATININE mg/dL 0.40*  --   --   --  0.38*  --  0.49* 0.51*  --  0.74*   GLUCOSE mg/dL 96  --   --   --  108*  --  137* 153*  --  177*   CALCIUM mg/dL 8.3*  --   --   --  8.4*  --  8.4* 8.7  --  8.4*    < > = values in this interval not displayed.         Estimated Creatinine Clearance: 533.7 mL/min (A) (by C-G formula based on SCr of 0.4 mg/dL (L)).  Results from last 7 days   Lab Units 11/08/24  0602 11/07/24  0559 11/06/24  0505 11/05/24  1314   WBC 10*3/mm3 11.17* 11.60* 15.40* 8.73   HEMOGLOBIN g/dL 9.3* 9.4* 9.6* 10.4*   PLATELETS 10*3/mm3 351 350 390 382         Results from last 7 days   Lab Units 11/08/24  0602 11/07/24  0559 11/06/24  0506 11/05/24  1314   ALT (SGPT) U/L 11 10 12 14   AST (SGOT) U/L 9 12 14 14                 Glucose   Date/Time Value Ref Range Status   11/08/2024 0757 102 70 - 130 mg/dL Final   11/08/2024 0013 97 70 - 130 mg/dL Final   11/07/2024 1955 111 70 - 130 mg/dL Final   11/07/2024 1614 99 70 - 130 mg/dL Final   11/07/2024 1121 103 70 - 130 mg/dL Final   11/07/2024 0701  106 70 - 130 mg/dL Final   11/07/2024 0417 102 70 - 130 mg/dL Final                 Active Hospital Problems    Diagnosis  POA    **Pituitary macroadenoma with extrasellar extension [D35.2]  Unknown      Resolved Hospital Problems   No resolved problems to display.         ASSESSMENT:  Pituitary macroadenoma status post transsphenoidal resection (11/5)  Diabetes insipidus  Super morbid obesity (BMI 67)  Hyperglycemia  Anemia      PLAN:  Neurochecks per protocol.  Desmopressin per nephrology orders Q8.    Cardene for blood pressure control and increasing oral antihypertensives.  Control glucose.  DVT prophylaxis.    Discussed with multidisciplinary ICU team on rounds this morning.      CCT: 33 min    Delfino Coles MD  Pulmonary and Critical Care Medicine  [facility], [facility]  11/8/2024    08:38 EST

## 2024-11-08 NOTE — PAYOR COMM NOTE
"Johnny Blum (34 y.o. Male)     PLEASE SEE ATTACHED FOR CONTINUED INPT STAY DAYS    REF # [id number]     PLEASE CALL KO DEVI RN/ DEPT @ [phone]   OR [phone]    THANK YOU  KO DEVI RN  [facility]        Date of Birth   1990    Social Security Number       Address   [address] [place] [address]    Home Phone   [phone]    MRN   [id number]       Jewish   None    Marital Status   Single                            Admission Date   11/5/24    Admission Type   Elective    Admitting Provider   Juan Frank MD    Attending Provider   Juan Frank MD    Department, Room/Bed   [facility] INTENSIVE CARE, I378/1       Discharge Date       Discharge Disposition       Discharge Destination                                 Attending Provider: Juan Frank MD    Allergies: Codeine, Bee Venom    Isolation: None   Infection: None   Code Status: CPR    Ht: 186.7 cm (73.5\")   Wt: 242 kg (534 lb 9.6 oz)    Admission Cmt: None   Principal Problem: Pituitary macroadenoma with extrasellar extension [D35.2]                   Active Insurance as of 11/5/2024       Primary Coverage       Payor Plan Insurance Group Employer/Plan Group    R [id number]       Payor Plan Address Payor Plan Phone Number Payor Plan Fax Number Effective Dates    [address] [address] [phone]  1/1/2013 - None Entered    [facility] [id number]         Subscriber Name Subscriber Birth Date Member ID       JOHNNY BLUM 1990 [id number]                     Emergency Contacts        (Rel.) Home Phone Work Phone Mobile Phone    moises coelho (Mother) -- -- [phone]    SANTOSH BLUM (Relative) -- -- [phone]              [place]: NPI [id number]  Tax ID [id number]  Medication Administration Report for Johnny Blum as of 11/8/24 through 11/8/24    Given  Hold  Not Given  Due  Canceled Entry  Other " Actions  Time  Time  (Time)  Time  Time-Action    Discontinued  Completed  Future  MAR Hold  Linked    Medications  11/08/24        acetaminophen (TYLENOL) tablet 650 mg  Dose: 650 mg  Freq: Every 4 Hours PRN Route: PO  PRN Reason: Mild Pain  Start: 11/05/24 1747    If given for fever, use fever parameter: fever greater than 100.4 °F  Based on patient request - if ordered for moderate or severe pain, provider allows for administration of a medication prescribed for a lower pain scale.    Do not exceed 4 grams of acetaminophen in a 24 hr period. Max dose of 2gm for AST/ALT greater than 120 units/L.    If given for pain, use the following pain scale:   Mild Pain = Pain Score of 1-3, CPOT 1-2  Moderate Pain = Pain Score of 4-6, CPOT 3-4  Severe Pain = Pain Score of 7-10, CPOT 5-8  Or    acetaminophen (TYLENOL) 160 MG/5ML oral solution 650 mg  Dose: 650 mg  Freq: Every 4 Hours PRN Route: PO  PRN Reason: Mild Pain  Start: 11/05/24 1747      Admin Instructions:  If given for fever, use fever parameter: fever greater than 100.4 °F  Based on patient request - if ordered for moderate or severe pain, provider allows for administration of a medication prescribed for a lower pain scale.    Do not exceed 4 grams of acetaminophen in a 24 hr period. Max dose of 2gm for AST/ALT greater than 120 units/L.    If given for pain, use the following pain scale:   Mild Pain = Pain Score of 1-3, CPOT 1-2  Moderate Pain = Pain Score of 4-6, CPOT 3-4  Severe Pain = Pain Score of 7-10, CPOT 5-8      acetaminophen (TYLENOL) tablet 650 mg  Dose: 650 mg  Freq: Every 4 Hours PRN Route: PO  PRN Reason: Fever  PRN Comment: fever greater than 100.4 °F or headache  Start: 11/05/24 2041    If given for fever, use fever parameter: fever greater than 100.4 °F  Based on patient request - if ordered for moderate or severe pain, provider allows for administration of a medication prescribed for a lower pain scale.    Do not exceed 4 grams of acetaminophen in  a 24 hr period. Max dose of 2gm for AST/ALT greater than 120 units/L.    If given for pain, use the following pain scale:   Mild Pain = Pain Score of 1-3, CPOT 1-2  Moderate Pain = Pain Score of 4-6, CPOT 3-4  Severe Pain = Pain Score of 7-10, CPOT 5-8  Or      acetaminophen (TYLENOL) suppository 650 mg  Dose: 650 mg  Freq: Every 4 Hours PRN Route: RE  PRN Reasons: Mild Pain,Fever  PRN Comment: temperature greater than 100.4 °F  Start: 24  Admin Instructions:     Physician Progress Notes (last 72 hours)        Delfino Coles MD at 24 0745              [facility] INPATIENT PROGRESS NOTE         [facility] INT[facility]    2024      PATIENT IDENTIFICATION:  Name: Johnny Duran ADMIT: 2024   : 1990  PCP: Edouard Vazquez MD    MRN: [id number] LOS: 3 days   AGE/SEX: 34 y.o. male  ROOM: [facility]                     LOS 3    Reason for visit: Postop pituitary macroadenoma resection      SUBJECTIVE:      Still on Cardene drip.  Increased oral blood pressure medicines yesterday.  Will add additional today.  No new issues overnight.      Objective   OBJECTIVE:    Vital Sign Min/Max for last 24 hours  Temp  Min: 97.9 °F (36.6 °C)  Max: 98.3 °F (36.8 °C)   BP  Min: 111/66  Max: 152/79   Pulse  Min: 84  Max: 109   Resp  Min: 20  Max: 20   SpO2  Min: 90 %  Max: 99 %   No data recorded   Weight  Min: 242 kg (534 lb 9.6 oz)  Max: 242 kg (534 lb 9.6 oz)    Vitals:    24 0530 24 0545 24 0600 24 0615   BP: 128/68 142/71 127/66 125/63   Pulse: 99 100 107 105   Resp:       Temp:       TempSrc:       SpO2: 93% 95% 96% 95%   Weight:   (!) 242 kg (534 lb 9.6 oz)             24  0522 24  0525 24   Weight: (!) 242 kg (533 lb) (!) 245 kg (540 lb) (!) 242 kg (534 lb 9.6 oz)       Body mass index is 69.58 kg/m².                          Body mass index is 69.58 kg/m².    Intake/Output Summary (Last 24 hours) at 2024 0838  Last data  filed at 11/8/2024 0600  Gross per 24 hour   Intake 1592 ml   Output 3235 ml   Net -1643 ml         Exam:  GEN:  No distress, appears stated age  EYES:   PERRL, anicteric sclerae  ENT:    External ears/nose normal, OP clear  NECK:  No adenopathy, midline trachea  LUNGS: Normal chest on inspection, palpation and auscultation  CV:  Normal S1S2, without murmur  ABD:  Nontender, nondistended, no hepatosplenomegaly, +BS  EXT:  No edema.  No cyanosis or clubbing.  No mottling and normal cap refill.    Assessment     Scheduled meds:  amLODIPine, 10 mg, Oral, Q24H  ampicillin-sulbactam, 3 g, Intravenous, Q6H  desmopressin, 1 mcg, Subcutaneous, Q12H  enoxaparin, 60 mg, Subcutaneous, BID  hydrALAZINE, 25 mg, Oral, Q8H  mupirocin, 1 Application, Each Nare, BID  senna-docusate sodium, 2 tablet, Oral, BID  sodium chloride, 2 spray, Each Nare, Q2H While Awake      IV meds:                      niCARdipine, 5-15 mg/hr, Last Rate: 12.5 mg/hr (11/08/24 0702)      Data Review:  Results from last 7 days   Lab Units 11/08/24  0602 11/08/24  0006 11/07/24  1801 11/07/24  1244 11/07/24  0559 11/06/24  1158 11/06/24  0506 11/05/24  2300 11/05/24  2037 11/05/24  1314   SODIUM mmol/L 138  138 137 134* 137 136  136   < > 142  142 141  140   < > 143   POTASSIUM mmol/L 3.7  --   --   --  3.9  --  4.2 4.1  --  4.0   CHLORIDE mmol/L 99  --   --   --  101  --  106 106  --  106   CO2 mmol/L 28.0  --   --   --  29.7*  --  26.6 24.8  --  25.7   BUN mg/dL 11  --   --   --  9  --  6 7  --  9   CREATININE mg/dL 0.40*  --   --   --  0.38*  --  0.49* 0.51*  --  0.74*   GLUCOSE mg/dL 96  --   --   --  108*  --  137* 153*  --  177*   CALCIUM mg/dL 8.3*  --   --   --  8.4*  --  8.4* 8.7  --  8.4*    < > = values in this interval not displayed.         Estimated Creatinine Clearance: 533.7 mL/min (A) (by C-G formula based on SCr of 0.4 mg/dL (L)).  Results from last 7 days   Lab Units 11/08/24  0602 11/07/24  0559 11/06/24  0505 11/05/24  1314   WBC  10*3/mm3 11.17* 11.60* 15.40* 8.73   HEMOGLOBIN g/dL 9.3* 9.4* 9.6* 10.4*   PLATELETS 10*3/mm3 351 350 390 382         Results from last 7 days   Lab Units 11/08/24  0602 11/07/24  0559 11/06/24  0506 11/05/24  1314   ALT (SGPT) U/L 11 10 12 14   AST (SGOT) U/L 9 12 14 14                 Glucose   Date/Time Value Ref Range Status   11/08/2024 0757 102 70 - 130 mg/dL Final   11/08/2024 0013 97 70 - 130 mg/dL Final   11/07/2024 1955 111 70 - 130 mg/dL Final   11/07/2024 1614 99 70 - 130 mg/dL Final   11/07/2024 1121 103 70 - 130 mg/dL Final   11/07/2024 0701 106 70 - 130 mg/dL Final   11/07/2024 0417 102 70 - 130 mg/dL Final                Active Hospital Problems    Diagnosis  POA    **Pituitary macroadenoma with extrasellar extension [D35.2]  Unknown      Resolved Hospital Problems   No resolved problems to display.         ASSESSMENT:  Pituitary macroadenoma status post transsphenoidal resection (11/5)  Diabetes insipidus  Super morbid obesity (BMI 67)  Hyperglycemia  Anemia      PLAN:  Neurochecks per protocol.  Desmopressin per nephrology orders Q8.    Cardene for blood pressure control and increasing oral antihypertensives.  Control glucose.  DVT prophylaxis.    Discussed with multidisciplinary ICU team on rounds this morning.      CCT: 33 min    Delfino Coles MD  Pulmonary and Critical Care Medicine  [facility]  11/8/2024    08:38 EST       Electronically signed by Delfino Coles MD at 11/08/24 0840       Trent Acuna APRN at 11/07/24 0930       Attestation signed by Juan Frank MD at 11/08/24 1039    I have reviewed this documentation and agree.    Will try to get him up and moving today.  Watch for CSF leak.  I appreciate nephrology assistance with his diabetes insipidus.    Plan to remove Scott catheter but continue every 4 hours strict I's and O's to evaluate for overall urine output                  [facility] note    PATIENT  IDENTIFICATION:   Name:  Johnny Duran      MRN:  [id number]     34 y.o.  male               CC:POD #2 s/p transsphenoidal resection of pituitary adenoma with intraoperative CSF leak with repair using harvesting of fascia lotta and fat tissue graft from right leg      Subjective     Interval History: No acute issues overnight.  Vital signs stable with one episode of low-grade temperature of 99.3.  WBC count trending down.  3 point decrease in sodium this a.m.  Nephrology following and plans to decrease desmopressin dosage.  Hourly urine output ranging from 60 cc an hour to 250 cc an hour but generally remaining well below 200 cc an hour.      Objective     Vital signs in last 24 hours:  Temp:  [97.8 °F (36.6 °C)-99.3 °F (37.4 °C)] 97.8 °F (36.6 °C)  Heart Rate:  [] 96  BP: (112-153)/(65-85) 153/76      Intake/Output this shift:  I/O this shift:  In: -   Out: 375 [Urine:350; Drains:25]    Intake/Output last 3 shifts:  I/O last 3 completed shifts:  In: 3290 [P.O.:300; I.V.:2990]  Out: 6405 [Urine:6375; Drains:30]    MARK drain: 25 cc / 24 hrs    LABS:  .  Results from last 7 days   Lab Units 11/07/24  0559 11/06/24  0505 11/05/24  1314   WBC 10*3/mm3 11.60* 15.40* 8.73   HEMOGLOBIN g/dL 9.4* 9.6* 10.4*   HEMATOCRIT % 29.5* 30.9* 32.6*   PLATELETS 10*3/mm3 350 390 382     .  Results from last 7 days   Lab Units 11/07/24  0559   SODIUM mmol/L 136  136   POTASSIUM mmol/L 3.9   CHLORIDE mmol/L 101   CO2 mmol/L 29.7*   BUN mg/dL 9   CREATININE mg/dL 0.38*   GLUCOSE mg/dL 108*   CALCIUM mg/dL 8.4*      Latest Reference Range & Units 11/07/24 05:58   Osmolality 275 - 300 mOsm/kg 286      Latest Reference Range & Units 11/07/24 05:59   Cortisol mcg/dL 12.70      Latest Reference Range & Units 11/07/24 05:24   Specific Gravity, UA 1.005 - 1.030  1.021       IMAGING STUDIES:  No new neuroimaging.  Unable to obtain MRI    I personally viewed and interpreted the patient's labs, medications and chart.    Meds  reviewed/changed: Yes    Current Facility-Administered Medications:     acetaminophen (TYLENOL) tablet 650 mg, 650 mg, Oral, Q4H PRN **OR** acetaminophen (TYLENOL) 160 MG/5ML oral solution 650 mg, 650 mg, Oral, Q4H PRN **OR** acetaminophen (TYLENOL) suppository 650 mg, 650 mg, Rectal, Q4H PRN, Juan Frank MD    acetaminophen (TYLENOL) tablet 650 mg, 650 mg, Oral, Q4H PRN **OR** acetaminophen (TYLENOL) suppository 650 mg, 650 mg, Rectal, Q4H PRN, Meek Iverson MD    aluminum-magnesium hydroxide-simethicone (MAALOX MAX) 400-400-40 MG/5ML suspension 15 mL, 15 mL, Oral, Q6H PRN, Meek Iverson MD    amLODIPine (NORVASC) tablet 10 mg, 10 mg, Oral, Q24H, Delfino Coles MD, 10 mg at 11/07/24 0800    sennosides-docusate (PERICOLACE) 8.6-50 MG per tablet 2 tablet, 2 tablet, Oral, BID **AND** polyethylene glycol (MIRALAX) packet 17 g, 17 g, Oral, Daily PRN **AND** bisacodyl (DULCOLAX) EC tablet 5 mg, 5 mg, Oral, Daily PRN **AND** bisacodyl (DULCOLAX) suppository 10 mg, 10 mg, Rectal, Daily PRN, Juan Frank MD    Calcium Replacement - Follow Nurse / BPA Driven Protocol, , Does not apply, PRN, Meek Iverson MD    ceFAZolin 2000 mg IVPB in 100 mL NS (MBP), 2,000 mg, Intravenous, Q8H, Juan Frank MD, 2,000 mg at 11/07/24 0302    desmopressin (DDAVP) injection 1 mcg, 1 mcg, Subcutaneous, Q8H, Rene Garcia MD    Enoxaparin Sodium (LOVENOX) syringe 60 mg, 60 mg, Subcutaneous, BID, Juan Frakn MD    HYDROcodone-acetaminophen (NORCO) 5-325 MG per tablet 1 tablet, 1 tablet, Oral, Q4H PRN, Juan Frank MD    Magnesium Standard Dose Replacement - Follow Nurse / BPA Driven Protocol, , Does not apply, PRN, Meek Iverson MD    mupirocin (BACTROBAN) 2 % nasal ointment 1 Application, 1 Application, Each Nare, BID, Meek Iverson MD, 1 Application at 11/07/24 0800    niCARdipine (CARDENE) 25 mg in 250 mL NS infusion kit, 5-15 mg/hr, Intravenous, Titrated, Juan Frank MD, Last Rate: 50 mL/hr at  11/07/24 0640, 5 mg/hr at 11/07/24 0640    nitroglycerin (NITROSTAT) SL tablet 0.4 mg, 0.4 mg, Sublingual, Q5 Min PRN, Meek Iverson MD    ondansetron ODT (ZOFRAN-ODT) disintegrating tablet 4 mg, 4 mg, Oral, Q6H PRN **OR** ondansetron (ZOFRAN) injection 4 mg, 4 mg, Intravenous, Q6H PRN, Juan Frank MD    ondansetron ODT (ZOFRAN-ODT) disintegrating tablet 4 mg, 4 mg, Oral, Q6H PRN **OR** ondansetron (ZOFRAN) injection 4 mg, 4 mg, Intravenous, Q6H PRN, Meek Iverson MD    Phosphorus Replacement - Follow Nurse / BPA Driven Protocol, , Does not apply, Zayra BRICENO Sandeep K, MD    Potassium Replacement - Follow Nurse / BPA Driven Protocol, , Does not apply, Zayra BRICENO Sandeep K, MD    sodium chloride nasal spray 2 spray, 2 spray, Each Nare, Q2H While Awake, Juan Frank MD, 2 spray at 11/07/24 0750  Desmopressin 20 mcg IV piggyback given at 0122 on 11/6/2024.  Scheduled for 2 mcg of desmopressin every 8 hours starting at 1145 today  On nicardipine drip  Physical Exam:    General:   Awake, alert, oriented x3. Speech clear with no aphasia  HEENT:    Nasal packing in place.  No drainage from nares.  Neck:    supple  CN II:    Visual fields full to confrontation  CN III IV VI:   Extraocular movements are full , PERRL 4 mm bilaterally and brisk to light  CN VII:    Facial movements are symmetric. No weakness.  Motor:    Normal muscle strength, bulk and tone in upper and lower extremities.  No fasciculations, rigidity, spasticity, or abnormal movements.  Reflexes:   Plantar responses are flexor.   Sensation:   Normal to light touch; no extinction  Station and Gait:  Gait deferred  Coordination:   Finger-to-nose and heel-to-shin test shows no dysmetria.  Extremities:   Wearing SCD.  Right lateral lower extremity incision with sutures in place and MARK drain draining sanguinous fluid.    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  He experienced an intraoperative CSF leak with repair using harvesting of fascia lotta and fat tissue graft from right leg.  Currently in expected postop surgical phase of diabetes insipidus being managed with desmopressin.  Neurologic exam is stable today with no clinical signs of CSF leak.  He denies vision change or headaches.  We will keep him strictly head of bed at 30 degrees until tomorrow and then start mobilizing.  Will keep Scott catheter in until tomorrow as well-continue hourly urine output measurements.  We can remove the right leg MARK drain today as there was minimal output in the past 24 hours.   Urine output ranging between 60 cc to 215 cc an hour overnight.  Nephrology has decreased desmopressin dosing from 2 mcg to 1 mcg subcutaneous every 8 hours.  We appreciate nephrology's input.     Unfortunately he was unable to obtain MRI due to body habitus.  We will schedule an outpatient pituitary MRI with and without contrast in the outpatient setting using an open MRI.    He will receive last dose of IV Ancef today and start IV Unasyn for a 2-day course.  Once completed he will start a 2-week course of p.o. Augmentin.    Please notify neurosurgery for 6 point increase or decrease from baseline serum sodium, urine specific gravity less than 1.005 and urine output greater than 250 cc/hour for 3 consecutive hours.    PLAN:     Continue head of bed 30 degrees  Will obtain MRI pituitary in outpatient setting  Remove MARK drain  As needed bowel regimen  As needed pain meds  SCD's  Lovenox for VTE prophylaxis  Observe for signs of CSF leak, vision change, headaches, other neuro change  Serial labs  Maintain SBP < 140  Nephrology following  Start IV Unasyn today    I discussed the patient's findings and my recommendations with patient, family, nursing staff, and Dr. Frank.    During patient visit, I utilized appropriate personal protective equipment  "including mask and gloves.  Mask used was standard procedure mask. Appropriate PPE was worn during the entire visit.  Hand hygiene was completed before and after.      LOS: 2 days       Trent Acuna, APRN  2024  08:48 EST    \"Dictated utilizing Dragon dictation\".      Electronically signed by Juan Frank MD at 24 1039       Rene Garcia MD at 24 0850              [facility] Progress Note      Patient Name: Johnny Duran  : 1990  MRN: [id number]  Primary Care Physician:  Edouard Vazquez MD  Date of admission: 2024    Subjective     Interval History:   Follow-up probable central DI    Patient has decreased urine output with the desmopressin sodium remains stable and his urine specific gravity has improved.  No chest pain or shortness of air, no orthopnea or PND.    Review of Systems:   As noted above    Objective     Vitals:   Temp:  [97.8 °F (36.6 °C)-99.3 °F (37.4 °C)] 97.8 °F (36.6 °C)  Heart Rate:  [] 96  BP: (112-153)/(65-85) 153/76  Flow (L/min) (Oxygen Therapy):  [12] 12    Intake/Output Summary (Last 24 hours) at 2024 0850  Last data filed at 2024 0759  Gross per 24 hour   Intake 1971 ml   Output 2560 ml   Net -589 ml       Physical Exam:    General Appearance: alert, awake, morbidly obese, chronic, no acute distress   Skin: warm and dry  HEENT: oral mucosa normal, nonicteric sclera  Neck: No JVD  Lungs: CTA, unlabored breathing effort  Heart: RRR, no rub  Abdomen: soft, nontender, nondistended  : no palpable bladder  Extremities: no edema, cyanosis or clubbing  Neuro: normal speech and mental status     Scheduled Meds:     amLODIPine, 10 mg, Oral, Q24H  ceFAZolin, 2,000 mg, Intravenous, Q8H  desmopressin, 1 mcg, Subcutaneous, Q8H  enoxaparin, 60 mg, Subcutaneous, BID  mupirocin, 1 Application, Each Nare, BID  senna-docusate sodium, 2 tablet, Oral, BID  sodium chloride, 2 spray, Each Nare, Q2H While " Awake      IV Meds:   niCARdipine, 5-15 mg/hr, Last Rate: 5 mg/hr (11/07/24 0640)        Results Reviewed:   I have personally reviewed the results from the time of this admission to 11/7/2024 08:50 EST     Results from last 7 days   Lab Units 11/07/24 0559 11/06/24  2309 11/06/24  1838 11/06/24  1158 11/06/24  0506 11/05/24  2300 11/05/24 2037 11/05/24  1314   SODIUM mmol/L 136  136 139 140   < > 142  142 141  140   < > 143   POTASSIUM mmol/L 3.9  --   --   --  4.2 4.1  --  4.0   CHLORIDE mmol/L 101  --   --   --  106 106  --  106   CO2 mmol/L 29.7*  --   --   --  26.6 24.8  --  25.7   BUN mg/dL 9  --   --   --  6 7  --  9   CREATININE mg/dL 0.38*  --   --   --  0.49* 0.51*  --  0.74*   CALCIUM mg/dL 8.4*  --   --   --  8.4* 8.7  --  8.4*   BILIRUBIN mg/dL 0.3  --   --   --  <0.2  --   --  <0.2   ALK PHOS U/L 94  --   --   --  92  --   --  107   ALT (SGPT) U/L 10  --   --   --  12  --   --  14   AST (SGOT) U/L 12  --   --   --  14  --   --  14   GLUCOSE mg/dL 108*  --   --   --  137* 153*  --  177*    < > = values in this interval not displayed.       Estimated Creatinine Clearance: 569.5 mL/min (A) (by C-G formula based on SCr of 0.38 mg/dL (L)).    Results from last 7 days   Lab Units 11/07/24 0559 11/06/24  0506 11/05/24  1314   MAGNESIUM mg/dL 2.2 2.2  --    PHOSPHORUS mg/dL 2.1* 2.8 2.7       Results from last 7 days   Lab Units 11/07/24 0559   URIC ACID mg/dL 3.3*       Results from last 7 days   Lab Units 11/07/24  0559 11/06/24  0505 11/05/24  1314   WBC 10*3/mm3 11.60* 15.40* 8.73   HEMOGLOBIN g/dL 9.4* 9.6* 10.4*   PLATELETS 10*3/mm3 350 390 382             Assessment / Plan     ASSESSMENT:  Diabetes insipidus secondary to pituitary adenoma resection, urine output decreased with desmopressin  Super morbid obese  Hypertension controlled with nicardipine drip  Pituitary adenoma status post transnasal transsphenoidal resection on 11/5/2024.    PLAN:  Decrease desmopressin to 1 mcg twice  daily  Surveillance labs    I reviewed the chart and other providers notes, reviewed labs.  I discussed the case with the patient and his mother at the bedside also with his nurse  Copied text in this note has been reviewed and is accurate as of 24.       Thank you for involving us in the care of Johnny Duran.  Please feel free to call with any questions.    Rene Garcia MD  24  08:50 [facility]    [facility]  [phone]    Please note that portions of this note were completed with a voice recognition program.    Electronically signed by Rene Garcia MD at 24 0853       Delfino Coles MD at 24 0704              [facility] INPATIENT PROGRESS NOTE         [facility] INTENSIVE CARE    2024      PATIENT IDENTIFICATION:  Name: Johnny Duran ADMIT: 2024   : 1990  PCP: Edouard Vazquez MD    MRN: [id number] LOS: 2 days   AGE/SEX: 34 y.o. male  ROOM: [facility]                     LOS 2    Reason for visit: Postop pituitary macroadenoma resection      SUBJECTIVE:      Resting comfortably.  Pain well-controlled.  Hemodynamically stable.      Objective   OBJECTIVE:    Vital Sign Min/Max for last 24 hours  Temp  Min: 97.8 °F (36.6 °C)  Max: 99.3 °F (37.4 °C)   BP  Min: 112/69  Max: 153/76   Pulse  Min: 89  Max: 110   No data recorded   SpO2  Min: 93 %  Max: 100 %   No data recorded   Weight  Min: 245 kg (540 lb)  Max: 245 kg (540 lb)    Vitals:    24 0815 24 0830 24 0845 24 0900   BP: 134/79 153/76 143/82 129/73   Pulse: 92 96 95 92   Resp:       Temp:       TempSrc:       SpO2: 95% 95% 96% 98%   Weight:                24  2100 24  0522 24  0525   Weight: (!) 242 kg (533 lb) (!) 242 kg (533 lb) (!) 245 kg (540 lb)       Body mass index is 70.28 kg/m².                          Body mass index is 70.28 kg/m².    Intake/Output Summary (Last 24 hours) at 2024 0923  Last data filed at  11/7/2024 0900  Gross per 24 hour   Intake 2211 ml   Output 2635 ml   Net -424 ml         Exam:  GEN:  No distress, appears stated age  EYES:   PERRL, anicteric sclerae  ENT:    External ears/nose normal, OP clear  NECK:  No adenopathy, midline trachea  LUNGS: Normal chest on inspection, palpation and auscultation  CV:  Normal S1S2, without murmur  ABD:  Nontender, nondistended, no hepatosplenomegaly, +BS  EXT:  No edema.  No cyanosis or clubbing.  No mottling and normal cap refill.    Assessment     Scheduled meds:  amLODIPine, 10 mg, Oral, Q24H  ceFAZolin, 2,000 mg, Intravenous, Q8H  desmopressin, 1 mcg, Subcutaneous, Q8H  enoxaparin, 60 mg, Subcutaneous, BID  mupirocin, 1 Application, Each Nare, BID  senna-docusate sodium, 2 tablet, Oral, BID  sodium chloride, 2 spray, Each Nare, Q2H While Awake      IV meds:                      niCARdipine, 5-15 mg/hr, Last Rate: 5 mg/hr (11/07/24 0640)      Data Review:  Results from last 7 days   Lab Units 11/07/24  0559 11/06/24  2309 11/06/24  1838 11/06/24  1158 11/06/24  0506 11/05/24  2300 11/05/24  2037 11/05/24  1314   SODIUM mmol/L 136  136 139 140 141 142  142 141  140   < > 143   POTASSIUM mmol/L 3.9  --   --   --  4.2 4.1  --  4.0   CHLORIDE mmol/L 101  --   --   --  106 106  --  106   CO2 mmol/L 29.7*  --   --   --  26.6 24.8  --  25.7   BUN mg/dL 9  --   --   --  6 7  --  9   CREATININE mg/dL 0.38*  --   --   --  0.49* 0.51*  --  0.74*   GLUCOSE mg/dL 108*  --   --   --  137* 153*  --  177*   CALCIUM mg/dL 8.4*  --   --   --  8.4* 8.7  --  8.4*    < > = values in this interval not displayed.         Estimated Creatinine Clearance: 569.5 mL/min (A) (by C-G formula based on SCr of 0.38 mg/dL (L)).  Results from last 7 days   Lab Units 11/07/24  0559 11/06/24  0505 11/05/24  1314   WBC 10*3/mm3 11.60* 15.40* 8.73   HEMOGLOBIN g/dL 9.4* 9.6* 10.4*   PLATELETS 10*3/mm3 350 390 382         Results from last 7 days   Lab Units 11/07/24  0559 11/06/24  0506  11/05/24  1314   ALT (SGPT) U/L 10 12 14   AST (SGOT) U/L 12 14 14                 Glucose   Date/Time Value Ref Range Status   11/07/2024 0701 106 70 - 130 mg/dL Final   11/07/2024 0417 102 70 - 130 mg/dL Final   11/06/2024 2014 115 70 - 130 mg/dL Final   11/06/2024 0504 140 (H) 70 - 130 mg/dL Final   11/06/2024 0055 137 (H) 70 - 130 mg/dL Final   11/05/2024 1744 164 (H) 70 - 130 mg/dL Final                Active Hospital Problems    Diagnosis  POA    **Pituitary macroadenoma with extrasellar extension [D35.2]  Unknown      Resolved Hospital Problems   No resolved problems to display.         ASSESSMENT:  Pituitary macroadenoma status post transsphenoidal resection (11/5)  Diabetes insipidus  Super morbid obesity (BMI 67)  Hyperglycemia  Anemia      PLAN:  Neurochecks per protocol.  Desmopressin per nephrology orders Q8.    Cardene for blood pressure control and increasing oral antihypertensives.  Control glucose.  DVT prophylaxis.    Discussed with multidisciplinary ICU team on rounds this morning.      CCT: 32 min    Delfino Coles MD  Pulmonary and Critical Care Medicine  [facility], [facility]  11/7/2024    09:23 EST       Electronically signed by Delfino Coles MD at 11/07/24 0925       Trent Acuna APRN at 11/06/24 1134       Attestation signed by Juan Frank MD at 11/06/24 1216    I have reviewed this documentation and agree.    Parents and some diabetes insipidus after surgery likely due to manipulation of the pituitary gland during surgery.  I appreciate nephrology's assistance very much.  He did require some desmopressin yesterday.  We had a CSF leak during surgery and given his body habitus, he did have expected elevated intracranial pressure.  Due to this potential high flow leak, we did a fascia brad graft repair with a nasal septal flap.  Typically, we would also also attempt a lumbar drain but with his body habitus it is very difficult to achieve and may need to place him  prone and attempts under fluoroscopy with a cutdown of his incision.  If possible, I would like to treat conservatively with some bed rest to allow for the fascial autograft and nasal septal flap to adhere to the nasal floor.                  [facility] NEUROSURGERY INTRACRANIAL POSTOP note    PATIENT IDENTIFICATION:   Name:  Johnny Duran      MRN:  [id number]     34 y.o.  male               CC:POD #1 s/p transsphenoidal resection of pituitary adenoma with intraoperative CSF leak with repair using harvesting of fascia lotta and fat tissue graft from right leg      Subjective     Interval History: No acute issues overnight.  Did experience significant increase in urine output.  Nephrology notified and started desmopressin which is significantly decreased urine output to more favorable range.  Now putting out between 120 and 200 cc an hour from 5 to 8 AM today as opposed to 360 cc to 820 cc/hr from 8 PM to midnight last night.  Denies headaches, visual change or any upper or lower extremity numbness, tingling or weakness.  He is a bit nervous about going for MRI due to worry that he will not fit into the scanner.    Objective     Vital signs in last 24 hours:  Temp:  [97.4 °F (36.3 °C)-98.3 °F (36.8 °C)] 98.2 °F (36.8 °C)  Heart Rate:  [] 102  Resp:  [15-27] 20  BP: (111-160)/(57-85) 127/65  Arterial Line BP: (-2-157)/(-6-59) -2/-6      Intake/Output this shift:  I/O this shift:  In: -   Out: 720 [Urine:720]    Intake/Output last 3 shifts:  I/O last 3 completed shifts:  In: 3269 [I.V.:3269]  Out: 4855 [Urine:4620; Drains:35; Blood:200]    MARK drain: 35 cc since placement    LABS:  .  Results from last 7 days   Lab Units 11/06/24  0505 11/05/24  1314   WBC 10*3/mm3 15.40* 8.73   HEMOGLOBIN g/dL 9.6* 10.4*   HEMATOCRIT % 30.9* 32.6*   PLATELETS 10*3/mm3 390 382     .  Results from last 7 days   Lab Units 11/06/24  0506   SODIUM mmol/L 142  142   POTASSIUM mmol/L 4.2   CHLORIDE mmol/L 106   CO2 mmol/L 26.6   BUN  mg/dL 6   CREATININE mg/dL 0.49*   GLUCOSE mg/dL 137*   CALCIUM mg/dL 8.4*      Latest Reference Range & Units 11/06/24 05:06   Cortisol mcg/dL 11.70      Latest Reference Range & Units 11/05/24 23:00 11/06/24 05:05   Osmolality 275 - 300 mOsm/kg 291 289         Latest Reference Range & Units 11/05/24 22:19   Color, UA Yellow, Straw  Yellow   Appearance, UA Clear  Clear   Specific Gravity, UA 1.005 - 1.030  1.006   pH, UA 5.0 - 8.0  6.5   Glucose Negative  Negative   Ketones, UA Negative  Negative   Blood, UA Negative  Small (1+) !   Nitrite, UA Negative  Negative   Leukocytes, UA Negative  Trace !   Protein, UA Negative  Negative   Bilirubin, UA Negative  Negative   Urobilinogen, UA 0.2 - 1.0 E.U./dL  0.2 E.U./dL   RBC, UA None Seen, 0-2 /HPF 3-5 !   WBC, UA None Seen, 0-2 /HPF 0-2   Bacteria, UA None Seen /HPF None Seen   Squamous Epithelial Cells, UA None Seen, 0-2 /HPF None Seen   Hyaline Casts, UA None Seen /LPF 0-2   Methodology:  Automated Microscopy   !: Data is abnormal    IMAGING STUDIES:  No new neuroimaging.  MRI pituitary pending pituitary    I personally viewed and interpreted the patient's labs, medications and chart.    Meds reviewed/changed: Yes    Current Facility-Administered Medications:     acetaminophen (TYLENOL) tablet 650 mg, 650 mg, Oral, Q4H PRN **OR** acetaminophen (TYLENOL) 160 MG/5ML oral solution 650 mg, 650 mg, Oral, Q4H PRN **OR** acetaminophen (TYLENOL) suppository 650 mg, 650 mg, Rectal, Q4H PRN, Juan Frank MD    acetaminophen (TYLENOL) tablet 650 mg, 650 mg, Oral, Q4H PRN **OR** acetaminophen (TYLENOL) suppository 650 mg, 650 mg, Rectal, Q4H PRN, Meek Iverson MD    aluminum-magnesium hydroxide-simethicone (MAALOX MAX) 400-400-40 MG/5ML suspension 15 mL, 15 mL, Oral, Q6H PRN, Meek Iverson MD    amLODIPine (NORVASC) tablet 10 mg, 10 mg, Oral, Q24H, Delfino Coles MD    sennosides-docusate (PERICOLACE) 8.6-50 MG per tablet 2 tablet, 2 tablet, Oral, BID **AND**  polyethylene glycol (MIRALAX) packet 17 g, 17 g, Oral, Daily PRN **AND** bisacodyl (DULCOLAX) EC tablet 5 mg, 5 mg, Oral, Daily PRN **AND** bisacodyl (DULCOLAX) suppository 10 mg, 10 mg, Rectal, Daily PRN, Juan Frank MD    Calcium Replacement - Follow Nurse / BPA Driven Protocol, , Does not apply, PRN, Meek Iverson MD    ceFAZolin 2000 mg IVPB in 100 mL NS (MBP), 2,000 mg, Intravenous, Q8H, Juan Frank MD, 2,000 mg at 11/06/24 1056    desmopressin (DDAVP) injection 2 mcg, 2 mcg, Subcutaneous, Q8H, Rene Garcia MD    [START ON 11/7/2024] Enoxaparin Sodium (LOVENOX) syringe 60 mg, 60 mg, Subcutaneous, BID, Juan Frank MD    HYDROcodone-acetaminophen (NORCO) 5-325 MG per tablet 1 tablet, 1 tablet, Oral, Q4H PRN, Juan Frank MD    Magnesium Standard Dose Replacement - Follow Nurse / BPA Driven Protocol, , Does not apply, PRN, Meek Iverson MD    mupirocin (BACTROBAN) 2 % nasal ointment 1 Application, 1 Application, Each Nare, BID, Meek Iverson MD, 1 Application at 11/06/24 1056    niCARdipine (CARDENE) 25 mg in 250 mL NS infusion kit, 5-15 mg/hr, Intravenous, Titrated, Juan Frank MD, Last Rate: 125 mL/hr at 11/06/24 0921, 12.5 mg/hr at 11/06/24 0921    nitroglycerin (NITROSTAT) SL tablet 0.4 mg, 0.4 mg, Sublingual, Q5 Min PRN, Meek Iverson MD    ondansetron ODT (ZOFRAN-ODT) disintegrating tablet 4 mg, 4 mg, Oral, Q6H PRN **OR** ondansetron (ZOFRAN) injection 4 mg, 4 mg, Intravenous, Q6H PRN, Juan Frank MD    ondansetron ODT (ZOFRAN-ODT) disintegrating tablet 4 mg, 4 mg, Oral, Q6H PRN **OR** ondansetron (ZOFRAN) injection 4 mg, 4 mg, Intravenous, Q6H PRN, Meek Iverson MD    [START ON 11/7/2024] Pharmacy to Dose enoxaparin (LOVENOX), , Does not apply, Nightly, Juan Frank MD    Phosphorus Replacement - Follow Nurse / BPA Driven Protocol, , Does not apply, PRN, Meek Iverson MD    Potassium Replacement - Follow Nurse / BPA Driven Protocol, , Does not apply, PRN,  Meek Iverson MD    sodium chloride nasal spray 2 spray, 2 spray, Each Nare, Q2H While Awake, uJan Frank MD, 2 spray at 11/06/24 1000  Desmopressin 20 mcg IV piggyback given at 0122 on 11/6/2024.  Scheduled for 2 mcg of desmopressin every 8 hours starting at 1145 today  On nicardipine drip  Physical Exam:    General:   Awake, alert, oriented x3. Speech clear with no aphasia  HEENT:    Nasal packing in place.  No drainage from naris.  Neck:    supple  CN II:    Visual fields full to confrontation  CN III IV VI:   Extraocular movements are full , PERRL 4 mm bilaterally and brisk to light  CN VII:    Facial movements are symmetric. No weakness.  Motor:    Normal muscle strength, bulk and tone in upper and lower extremities.  No fasciculations, rigidity, spasticity, or abnormal movements.  Reflexes:   Plantar responses are flexor.   Sensation:   Normal to light touch; no extinction  Station and Gait:  Gait deferred  Coordination:   Finger-to-nose and heel-to-shin test shows no dysmetria.  Extremities:   Wearing SCD.  Right lateral lower extremity incision with sutures in place and MARK drain draining sanguinous fluid.  No erythema, swelling or drainage at the suture site.    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  He experienced an intraoperative CSF leak with repair using harvesting of fascia lotta and fat tissue graft from right leg.  Yesterday afternoon patient exhibited significant increase in urine output and received one-time dose of desmopressin per nephrology.  He will start every 8 hour subcutaneous desmopressin injections today.  Please continue to monitor hourly urine output and notify neurosurgery for signs of neurologic change or CSF leak.  Currently neurologic exam is stable and he is denying headaches.  Currently he is on nicardipine drip with blood  "pressure managed fairly within desired parameters.  Please continue serial labs as ordered.  Appreciate nephrology input.    Mr. Duran will remain on IV Ancef for 2 full days and then switch to Unasyn for 2 days.  At that point he will transition to a 2-week course of oral Augmentin.    Please obtain brain MRI today.  If patient unable to tolerate scanner due to body habitus, we will schedule an outpatient open MRI.    PLAN:     Continue head of bed 30 degrees  Obtain MRI pituitary +/-  Keep MARK drain, likely remove in a.m.  As needed bowel regimen  As needed pain meds  SCD's  Lovenox for VTE prophylaxis  Observe for signs of CSF leak  Serial labs  Maintain SBP < 140      I discussed the patient's findings and my recommendations with patient, family, nursing staff, and Dr. Frank.    During patient visit, I utilized appropriate personal protective equipment including mask and gloves.  Mask used was standard procedure mask. Appropriate PPE was worn during the entire visit.  Hand hygiene was completed before and after.      LOS: 1 day       Trent Gage Acuna, APRN  2024  11:35 EST    \"Dictated utilizing Dragon dictation\".      Electronically signed by Juan Frank MD at 24 1216       Delfino Cloes MD at 24 0734              [facility] INPATIENT PROGRESS NOTE         [facility] INTENSIVE CARE    2024      PATIENT IDENTIFICATION:  Name: Johnny Duran ADMIT: 2024   : 1990  PCP: Edouard Vazquez MD    MRN: [id number] LOS: 1 days   AGE/SEX: 34 y.o. male  ROOM: [facility]                     LOS 1    Reason for visit: Postop pituitary macroadenoma resection      SUBJECTIVE:      Resting comfortably.  Pain is well-controlled.  On nicardipine for blood pressure control.  Denies shortness of breath, cough, nausea or vomiting. I am seeing the patient for the first time today.  All patient problems are new to me.      Objective   OBJECTIVE:    Vital Sign Min/Max for last 24 " hours  Temp  Min: 97.4 °F (36.3 °C)  Max: 98.3 °F (36.8 °C)   BP  Min: 111/65  Max: 160/81   Pulse  Min: 96  Max: 118   Resp  Min: 15  Max: 27   SpO2  Min: 87 %  Max: 100 %   No data recorded   Weight  Min: 242 kg (533 lb)  Max: 242 kg (533 lb)    Vitals:    11/06/24 0615 11/06/24 0700 11/06/24 0725 11/06/24 0801   BP: 132/72 136/80  141/84   Pulse: 101 100  101   Resp:       Temp:   98.2 °F (36.8 °C)    TempSrc:   Oral    SpO2: 98% 93%     Weight:                11/05/24  2100 11/06/24  0522   Weight: (!) 242 kg (533 lb) (!) 242 kg (533 lb)       Body mass index is 69.37 kg/m².        S RR:  [8-16] 8                 Body mass index is 69.37 kg/m².    Intake/Output Summary (Last 24 hours) at 11/6/2024 0909  Last data filed at 11/6/2024 0800  Gross per 24 hour   Intake 3269 ml   Output 4975 ml   Net -1706 ml         Exam:  GEN:  No distress, appears stated age  EYES:   PERRL, anicteric sclerae  ENT:    External ears/nose normal, OP clear  NECK:  No adenopathy, midline trachea  LUNGS: Normal chest on inspection, palpation and auscultation  CV:  Normal S1S2, without murmur  ABD:  Nontender, nondistended, no hepatosplenomegaly, +BS  EXT:  No edema.  No cyanosis or clubbing.  No mottling and normal cap refill.    Assessment     Scheduled meds:  ceFAZolin, 2,000 mg, Intravenous, Q8H  [START ON 11/7/2024] enoxaparin, 60 mg, Subcutaneous, BID  mupirocin, 1 Application, Each Nare, BID  [START ON 11/7/2024] Pharmacy to Dose enoxaparin (LOVENOX), , Does not apply, Nightly  senna-docusate sodium, 2 tablet, Oral, BID  sodium chloride, 2 spray, Each Nare, Q2H While Awake      IV meds:                      niCARdipine, 5-15 mg/hr, Last Rate: 12.5 mg/hr (11/06/24 0801)      Data Review:  Results from last 7 days   Lab Units 11/06/24  0506 11/05/24  2300 11/05/24 2037 11/05/24  1314   SODIUM mmol/L 142  142 141  140 138 143   POTASSIUM mmol/L 4.2 4.1  --  4.0   CHLORIDE mmol/L 106 106  --  106   CO2 mmol/L 26.6 24.8  --  25.7    BUN mg/dL 6 7  --  9   CREATININE mg/dL 0.49* 0.51*  --  0.74*   GLUCOSE mg/dL 137* 153*  --  177*   CALCIUM mg/dL 8.4* 8.7  --  8.4*         Estimated Creatinine Clearance: 435.7 mL/min (A) (by C-G formula based on SCr of 0.49 mg/dL (L)).  Results from last 7 days   Lab Units 24  0505 24  1314   WBC 10*3/mm3 15.40* 8.73   HEMOGLOBIN g/dL 9.6* 10.4*   PLATELETS 10*3/mm3 390 382         Results from last 7 days   Lab Units 24  0506 24  1314   ALT (SGPT) U/L 12 14   AST (SGOT) U/L 14 14                 Glucose   Date/Time Value Ref Range Status   2024 0504 140 (H) 70 - 130 mg/dL Final   2024 0055 137 (H) 70 - 130 mg/dL Final   2024 1744 164 (H) 70 - 130 mg/dL Final                Active Hospital Problems    Diagnosis  POA    **Pituitary macroadenoma with extrasellar extension [D35.2]  Unknown      Resolved Hospital Problems   No resolved problems to display.         ASSESSMENT:  Pituitary macroadenoma status post transsphenoidal resection ()  Diabetes insipidus  Super morbid obesity (BMI 67)  Hyperglycemia  Anemia      PLAN:  Neurochecks per protocol.  Received a dose of desmopressin per nephrology orders.  Discussed with Dr. Kaden Mendez for blood pressure control and adding oral antihypertensives.  Control glucose.  DVT prophylaxis.    Discussed with multidisciplinary ICU team on rounds this morning.      CCT: 32 min    Delfino Coles MD  Pulmonary and Critical Care Medicine  [facility], [facility]  2024    09:09 EST       Electronically signed by Delfino Coles MD at 24 0912          Consult Notes (last 72 hours)        Rene Garcia MD at 24 1054            [facility] Consult Note      Patient Name: Johnny Duran  : 1990  MRN: [id number]  Primary Care Physician:  Edouard Vazquez MD  Referring Physician: Edouard Vazquez, *  Date of admission: 2024    Subjective     Reason  for Consult: [facility]    HPI:   Johnny Duran is a 34 y.o. male patient was admitted on 11/5/2024, underwent transnasal transsphenoidal pituitary adenoma resection, noted to have increased urine output hence nephrology consult was requested he has very dilute urine, he received 20 mcg of desmopressin last night and his urine output remains elevated but slower rate.  He is morbidly obese history of hyperglycemia and dyslipidemia.  The patient is comfortable denies any chest pain or shortness of air, no orthopnea or PND.    Review of Systems:   14 point review of systems is otherwise negative except for mentioned above on HPI    Personal History     Past Medical History:   Diagnosis Date    Anemia     Dizziness     Encounter for screening for malignant neoplasm of rectum     Gallstones     Nausea and vomiting in adult     Nondisplaced fracture of fifth left metatarsal bone     Nondisplaced longitudinal fracture of right patella, initial encounter for closed fracture     Impression: recommend ortho eval, appt today.    Obesity     Obesity, morbid, BMI 40.0-49.9     Overweight (BMI 25.0-29.9)     Plantar fasciitis     right    Screening for depression     Screening for hyperlipidemia     Stress fracture     Traumatic arthritis of right knee        Past Surgical History:   Procedure Laterality Date    CHOLECYSTECTOMY      ORIF FOOT FRACTURE Left 2016    PLANTAR FASCIA RELEASE Right 10/03/2022    Procedure: ENDOSCOPIC PLANTAR FASCIOTOMY;  Surgeon: CASEY Berman DPM;  Location: [facility] MAIN OR;  Service: Podiatry;  Laterality: Right;       Family History: family history includes Colon cancer in his maternal grandfather; Diabetes in his father and paternal grandfather.    Social History:  reports that he has never smoked. He has never been exposed to tobacco smoke. He has never used smokeless tobacco. He reports that he does not currently use alcohol. He reports that he does not use drugs.    Home Medications:  Prior  to Admission medications    Not on File       Allergies:  Allergies   Allergen Reactions    Codeine Hives    Bee Venom Swelling       Objective     Vitals:   Temp:  [97.4 °F (36.3 °C)-98.3 °F (36.8 °C)] 98.2 °F (36.8 °C)  Heart Rate:  [] 102  Resp:  [15-27] 20  BP: (111-160)/(57-85) 144/82  Arterial Line BP: (-2-157)/(-6-59) -2/-6  Flow (L/min) (Oxygen Therapy):  [3-12] 12    Intake/Output Summary (Last 24 hours) at 11/6/2024 1054  Last data filed at 11/6/2024 0923  Gross per 24 hour   Intake 3269 ml   Output 5225 ml   Net -1956 ml       Physical Exam:   Constitutional: Awake, alert, no acute distress.  Super morbidly obese  HEENT: Sclera anicteric, no conjunctival injection  Neck: No JVD  Respiratory: Clear to auscultation bilaterally, nonlabored respiration  Cardiovascular: RRR, no murmurs, no rubs or gallops, no carotid bruit  Gastrointestinal: Positive bowel sounds, abdomen is soft, nontender and nondistended  : No palpable bladder, Scott catheter anchored  Musculoskeletal: No edema, no clubbing or cyanosis  Psychiatric: Flat affect, cooperative  Neurologic: Moving all extremities, normal speech and mental status  Skin: Warm and dry       Scheduled Meds:     amLODIPine, 10 mg, Oral, Q24H  ceFAZolin, 2,000 mg, Intravenous, Q8H  desmopressin, 2 mcg, Subcutaneous, Q8H  [START ON 11/7/2024] enoxaparin, 60 mg, Subcutaneous, BID  mupirocin, 1 Application, Each Nare, BID  [START ON 11/7/2024] Pharmacy to Dose enoxaparin (LOVENOX), , Does not apply, Nightly  senna-docusate sodium, 2 tablet, Oral, BID  sodium chloride, 2 spray, Each Nare, Q2H While Awake      IV Meds:   niCARdipine, 5-15 mg/hr, Last Rate: 12.5 mg/hr (11/06/24 0921)        Results Reviewed:   I have personally reviewed the results from the time of this admission to 11/6/2024 10:54 EST     Lab Results   Component Value Date    GLUCOSE 137 (H) 11/06/2024    CALCIUM 8.4 (L) 11/06/2024     11/06/2024     11/06/2024    K 4.2 11/06/2024     CO2 26.6 11/06/2024     11/06/2024    BUN 6 11/06/2024    CREATININE 0.49 (L) 11/06/2024    EGFRIFAFRI 139 10/15/2021    EGFRIFNONA 120 10/15/2021    BCR 12.2 11/06/2024    ANIONGAP 9.4 11/06/2024      Lab Results   Component Value Date    MG 2.2 11/06/2024    PHOS 2.8 11/06/2024    ALBUMIN 3.3 (L) 11/06/2024           Assessment / Plan       Pituitary macroadenoma with extrasellar extension      ASSESSMENT:  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    PLAN:  Will continue to monitor sodium and his urine output closely  Start desmopressin 2 mcg subcu every 8 hours and adjust the dose as needed  Surveillance labs    I reviewed the chart and other providers notes, reviewed labs.  I discussed the case with Dr. Coles    Thank you for involving us in the care of Johnny Duran.  Please feel free to call with any questions.    Rene Garcia MD  11/06/24  10:54 [facility]    [facility]  [phone]      Please note that portions of this note were completed with a voice recognition program.    Electronically signed by Rene Garcia MD at 11/06/24 1059       Meek Iverson MD at 11/05/24 2109        Consult Orders    1. Inpatient Pulmonology Consult [[id number]] ordered by Juan Frank MD at 11/05/24 2000                 Please see my note from the same day.    Electronically signed by Meek Iverson MD at 11/05/24 2110

## 2024-11-08 NOTE — PLAN OF CARE
Goal Outcome Evaluation:           Progress: improving   Pt VSS. Pt restarted on cardene gtt to maintain systolic<140 overnight. Urine output continues to be below 250ml/hr. Pt has no complaints of pain or changes in vision.

## 2024-11-08 NOTE — PROGRESS NOTES
[facility] Progress Note      Patient Name: Johnny Duran  : 1990  MRN: [id number]  Primary Care Physician:  Edouard Vazquez MD  Date of admission: 2024    Subjective     Interval History:   Follow-up probable central DI    Patient has good urine output with the desmopressin sodium remains stable and his urine specific gravity has improved.  No chest pain or shortness of air, no orthopnea or PND.    Review of Systems:   As noted above    Objective     Vitals:   Temp:  [98.1 °F (36.7 °C)-98.5 °F (36.9 °C)] 98.5 °F (36.9 °C)  Heart Rate:  [] 105  Resp:  [20] 20  BP: (111-152)/(63-88) 125/63  Flow (L/min) (Oxygen Therapy):  [8] 8    Intake/Output Summary (Last 24 hours) at 2024 1213  Last data filed at 2024 1100  Gross per 24 hour   Intake 1592 ml   Output 3675 ml   Net -2083 ml       Physical Exam:    General Appearance: alert, awake, morbidly obese, chronic, no acute distress   Skin: warm and dry  HEENT: oral mucosa normal, nonicteric sclera  Neck: No JVD  Lungs: CTA, unlabored breathing effort  Heart: RRR, no rub  Abdomen: soft, nontender, nondistended  : no palpable bladder  Extremities: no edema, cyanosis or clubbing  Neuro: normal speech and mental status     Scheduled Meds:     amLODIPine, 10 mg, Oral, Q24H  [START ON 11/10/2024] amoxicillin-clavulanate, 1 tablet, Oral, Q12H  ampicillin-sulbactam, 3 g, Intravenous, Q6H  desmopressin, 1 mcg, Subcutaneous, Q12H  enoxaparin, 60 mg, Subcutaneous, BID  hydrALAZINE, 50 mg, Oral, Q8H  metoprolol tartrate, 50 mg, Oral, Q12H  mupirocin, 1 Application, Each Nare, BID  senna-docusate sodium, 2 tablet, Oral, BID  sodium chloride, 2 spray, Each Nare, Q2H While Awake      IV Meds:   niCARdipine, 5-15 mg/hr, Last Rate: Stopped (24 1149)        Results Reviewed:   I have personally reviewed the results from the time of this admission to 2024 12:13 EST     Results from last 7 days   Lab Units  11/08/24  0602 11/08/24  0006 11/07/24  1801 11/07/24  1244 11/07/24  0559 11/06/24  1158 11/06/24  0506   SODIUM mmol/L 138  138 137 134*   < > 136  136   < > 142  142   POTASSIUM mmol/L 3.7  --   --   --  3.9  --  4.2   CHLORIDE mmol/L 99  --   --   --  101  --  106   CO2 mmol/L 28.0  --   --   --  29.7*  --  26.6   BUN mg/dL 11  --   --   --  9  --  6   CREATININE mg/dL 0.40*  --   --   --  0.38*  --  0.49*   CALCIUM mg/dL 8.3*  --   --   --  8.4*  --  8.4*   BILIRUBIN mg/dL 0.4  --   --   --  0.3  --  <0.2   ALK PHOS U/L 102  --   --   --  94  --  92   ALT (SGPT) U/L 11  --   --   --  10  --  12   AST (SGOT) U/L 9  --   --   --  12  --  14   GLUCOSE mg/dL 96  --   --   --  108*  --  137*    < > = values in this interval not displayed.       Estimated Creatinine Clearance: 533.7 mL/min (A) (by C-G formula based on SCr of 0.4 mg/dL (L)).    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    PLAN:  Decrease desmopressin 1 mcg twice daily  Surveillance labs    I reviewed the chart and other providers notes, reviewed labs.  I discussed the case with the patient and his mother at the bedside   Copied text in this note has been reviewed and  is accurate as of 11/08/24.       Thank you for involving us in the care of Johnny Duran.  Please feel free to call with any questions.    Rene Garcia MD  11/08/24  12:13 [facility]    [facility]  [phone]    Please note that portions of this note were completed with a voice recognition program.

## 2024-11-09 LAB
ALBUMIN SERPL-MCNC: 3.4 G/DL (ref 3.5–5.2)
ANION GAP SERPL CALCULATED.3IONS-SCNC: 7.7 MMOL/L (ref 5–15)
BASOPHILS # BLD AUTO: 0.04 10*3/MM3 (ref 0–0.2)
BASOPHILS NFR BLD AUTO: 0.4 % (ref 0–1.5)
BUN SERPL-MCNC: 12 MG/DL (ref 6–20)
BUN/CREAT SERPL: 30.8 (ref 7–25)
CALCIUM SPEC-SCNC: 8.9 MG/DL (ref 8.6–10.5)
CHLORIDE SERPL-SCNC: 102 MMOL/L (ref 98–107)
CO2 SERPL-SCNC: 29.3 MMOL/L (ref 22–29)
CORTIS SERPL-MCNC: 5.8 MCG/DL
CREAT SERPL-MCNC: 0.39 MG/DL (ref 0.76–1.27)
DEPRECATED RDW RBC AUTO: 40 FL (ref 37–54)
EGFRCR SERPLBLD CKD-EPI 2021: 148 ML/MIN/1.73
EOSINOPHIL # BLD AUTO: 0.36 10*3/MM3 (ref 0–0.4)
EOSINOPHIL NFR BLD AUTO: 3.4 % (ref 0.3–6.2)
ERYTHROCYTE [DISTWIDTH] IN BLOOD BY AUTOMATED COUNT: 13.1 % (ref 12.3–15.4)
GLUCOSE BLDC GLUCOMTR-MCNC: 101 MG/DL (ref 70–130)
GLUCOSE BLDC GLUCOMTR-MCNC: 112 MG/DL (ref 70–130)
GLUCOSE BLDC GLUCOMTR-MCNC: 138 MG/DL (ref 70–130)
GLUCOSE SERPL-MCNC: 102 MG/DL (ref 65–99)
HCT VFR BLD AUTO: 28.9 % (ref 37.5–51)
HGB BLD-MCNC: 9.3 G/DL (ref 13–17.7)
IMM GRANULOCYTES # BLD AUTO: 0.05 10*3/MM3 (ref 0–0.05)
IMM GRANULOCYTES NFR BLD AUTO: 0.5 % (ref 0–0.5)
LYMPHOCYTES # BLD AUTO: 2.26 10*3/MM3 (ref 0.7–3.1)
LYMPHOCYTES NFR BLD AUTO: 21.1 % (ref 19.6–45.3)
MAGNESIUM SERPL-MCNC: 2.2 MG/DL (ref 1.6–2.6)
MCH RBC QN AUTO: 27.4 PG (ref 26.6–33)
MCHC RBC AUTO-ENTMCNC: 32.2 G/DL (ref 31.5–35.7)
MCV RBC AUTO: 85.3 FL (ref 79–97)
MONOCYTES # BLD AUTO: 0.7 10*3/MM3 (ref 0.1–0.9)
MONOCYTES NFR BLD AUTO: 6.5 % (ref 5–12)
NEUTROPHILS NFR BLD AUTO: 68.1 % (ref 42.7–76)
NEUTROPHILS NFR BLD AUTO: 7.31 10*3/MM3 (ref 1.7–7)
NRBC BLD AUTO-RTO: 0 /100 WBC (ref 0–0.2)
OSMOLALITY SERPL: 286 MOSM/KG (ref 275–300)
OSMOLALITY SERPL: 286 MOSM/KG (ref 275–300)
OSMOLALITY SERPL: 291 MOSM/KG (ref 275–300)
OSMOLALITY SERPL: 295 MOSM/KG (ref 275–300)
PHOSPHATE SERPL-MCNC: 3.3 MG/DL (ref 2.5–4.5)
PLATELET # BLD AUTO: 386 10*3/MM3 (ref 140–450)
PMV BLD AUTO: 9.5 FL (ref 6–12)
POTASSIUM SERPL-SCNC: 3.9 MMOL/L (ref 3.5–5.2)
RBC # BLD AUTO: 3.39 10*6/MM3 (ref 4.14–5.8)
SODIUM SERPL-SCNC: 134 MMOL/L (ref 136–145)
SODIUM SERPL-SCNC: 137 MMOL/L (ref 136–145)
SODIUM SERPL-SCNC: 137 MMOL/L (ref 136–145)
SODIUM SERPL-SCNC: 139 MMOL/L (ref 136–145)
SP GR UR STRIP: 1.01 (ref 1–1.03)
SP GR UR STRIP: 1.02 (ref 1–1.03)
SP GR UR STRIP: 1.03 (ref 1–1.03)
URATE SERPL-MCNC: 3.6 MG/DL (ref 3.4–7)
WBC NRBC COR # BLD AUTO: 10.72 10*3/MM3 (ref 3.4–10.8)

## 2024-11-09 PROCEDURE — 83930 ASSAY OF BLOOD OSMOLALITY: CPT | Performed by: NURSE PRACTITIONER

## 2024-11-09 PROCEDURE — 97161 PT EVAL LOW COMPLEX 20 MIN: CPT

## 2024-11-09 PROCEDURE — 85025 COMPLETE CBC W/AUTO DIFF WBC: CPT | Performed by: HOSPITALIST

## 2024-11-09 PROCEDURE — 81003 URINALYSIS AUTO W/O SCOPE: CPT | Performed by: NURSE PRACTITIONER

## 2024-11-09 PROCEDURE — 25010000002 DESMOPRESSIN ACETATE PF 4 MCG/ML SOLUTION: Performed by: NURSE PRACTITIONER

## 2024-11-09 PROCEDURE — 82533 TOTAL CORTISOL: CPT | Performed by: NURSE PRACTITIONER

## 2024-11-09 PROCEDURE — 84550 ASSAY OF BLOOD/URIC ACID: CPT | Performed by: NURSE PRACTITIONER

## 2024-11-09 PROCEDURE — 25010000002 ENOXAPARIN PER 10 MG: Performed by: NURSE PRACTITIONER

## 2024-11-09 PROCEDURE — 82948 REAGENT STRIP/BLOOD GLUCOSE: CPT

## 2024-11-09 PROCEDURE — 99024 POSTOP FOLLOW-UP VISIT: CPT

## 2024-11-09 PROCEDURE — 94760 N-INVAS EAR/PLS OXIMETRY 1: CPT

## 2024-11-09 PROCEDURE — 25010000002 AMPICILLIN-SULBACTAM PER 1.5 G: Performed by: NURSE PRACTITIONER

## 2024-11-09 PROCEDURE — 83735 ASSAY OF MAGNESIUM: CPT | Performed by: NURSE PRACTITIONER

## 2024-11-09 PROCEDURE — 80069 RENAL FUNCTION PANEL: CPT | Performed by: NURSE PRACTITIONER

## 2024-11-09 PROCEDURE — 84295 ASSAY OF SERUM SODIUM: CPT | Performed by: NURSE PRACTITIONER

## 2024-11-09 PROCEDURE — 94799 UNLISTED PULMONARY SVC/PX: CPT

## 2024-11-09 RX ADMIN — MUPIROCIN 1 APPLICATION: 20 OINTMENT TOPICAL at 09:15

## 2024-11-09 RX ADMIN — SENNOSIDES AND DOCUSATE SODIUM 2 TABLET: 50; 8.6 TABLET ORAL at 21:26

## 2024-11-09 RX ADMIN — SALINE NASAL SPRAY 2 SPRAY: 1.5 SOLUTION NASAL at 16:09

## 2024-11-09 RX ADMIN — METOPROLOL TARTRATE 50 MG: 50 TABLET, FILM COATED ORAL at 09:15

## 2024-11-09 RX ADMIN — SENNOSIDES AND DOCUSATE SODIUM 2 TABLET: 50; 8.6 TABLET ORAL at 09:15

## 2024-11-09 RX ADMIN — SALINE NASAL SPRAY 2 SPRAY: 1.5 SOLUTION NASAL at 17:26

## 2024-11-09 RX ADMIN — AMLODIPINE BESYLATE 10 MG: 10 TABLET ORAL at 09:15

## 2024-11-09 RX ADMIN — SALINE NASAL SPRAY 2 SPRAY: 1.5 SOLUTION NASAL at 21:28

## 2024-11-09 RX ADMIN — HYDRALAZINE HYDROCHLORIDE 50 MG: 50 TABLET ORAL at 13:46

## 2024-11-09 RX ADMIN — METOPROLOL TARTRATE 50 MG: 50 TABLET, FILM COATED ORAL at 21:26

## 2024-11-09 RX ADMIN — SALINE NASAL SPRAY 2 SPRAY: 1.5 SOLUTION NASAL at 07:51

## 2024-11-09 RX ADMIN — HYDRALAZINE HYDROCHLORIDE 50 MG: 50 TABLET ORAL at 21:26

## 2024-11-09 RX ADMIN — AMPICILLIN SODIUM AND SULBACTAM SODIUM 3 G: 2; 1 INJECTION, POWDER, FOR SOLUTION INTRAMUSCULAR; INTRAVENOUS at 13:46

## 2024-11-09 RX ADMIN — SALINE NASAL SPRAY 2 SPRAY: 1.5 SOLUTION NASAL at 22:30

## 2024-11-09 RX ADMIN — HYDRALAZINE HYDROCHLORIDE 50 MG: 50 TABLET ORAL at 06:29

## 2024-11-09 RX ADMIN — AMPICILLIN SODIUM AND SULBACTAM SODIUM 3 G: 2; 1 INJECTION, POWDER, FOR SOLUTION INTRAMUSCULAR; INTRAVENOUS at 06:29

## 2024-11-09 RX ADMIN — SALINE NASAL SPRAY 2 SPRAY: 1.5 SOLUTION NASAL at 09:17

## 2024-11-09 RX ADMIN — DESMOPRESSIN ACETATE 1 MCG: 4 INJECTION, SOLUTION INTRAVENOUS; SUBCUTANEOUS at 12:29

## 2024-11-09 RX ADMIN — ENOXAPARIN SODIUM 60 MG: 100 INJECTION SUBCUTANEOUS at 09:15

## 2024-11-09 RX ADMIN — SALINE NASAL SPRAY 2 SPRAY: 1.5 SOLUTION NASAL at 06:29

## 2024-11-09 RX ADMIN — SALINE NASAL SPRAY 2 SPRAY: 1.5 SOLUTION NASAL at 13:46

## 2024-11-09 RX ADMIN — SALINE NASAL SPRAY 2 SPRAY: 1.5 SOLUTION NASAL at 12:30

## 2024-11-09 NOTE — PROGRESS NOTES
[facility] Progress Note      Patient Name: Johnny Duran  : 1990  MRN: [id number]  Primary Care Physician:  Edouard Vazquez MD  Date of admission: 2024    Subjective     Interval History:   Follow-up probable central diabetes insipidus    Desmopressin dose lowered yesterday. Urine output recorded 4.08 Liters yesterday. Urine specific gravity remains relatively stable.  No chest pain or shortness of air, no orthopnea or PND.      Review of Systems:   As noted above    Objective     Vitals:   Temp:  [97.6 °F (36.4 °C)-98.7 °F (37.1 °C)] 97.6 °F (36.4 °C)  Heart Rate:  [81-93] 89  Resp:  [10-18] 12  BP: (118-154)/(63-82) 130/65  Flow (L/min) (Oxygen Therapy):  [6] 6    Intake/Output Summary (Last 24 hours) at 2024 1116  Last data filed at 2024 1100  Gross per 24 hour   Intake 1085 ml   Output 3805 ml   Net -2720 ml       Physical Exam:    General Appearance: alert, awake, morbidly obese, chronic, no acute distress   Skin: warm and dry  HEENT: oral mucosa normal, nonicteric sclera  Neck: No JVD  Lungs: CTA, unlabored breathing effort  Heart: RRR, no rub  Abdomen: soft, nontender, nondistended  : no palpable bladder  Extremities: no edema, cyanosis or clubbing  Neuro: normal speech and mental status     Scheduled Meds:     amLODIPine, 10 mg, Oral, Q24H  [START ON 11/10/2024] amoxicillin-clavulanate, 1 tablet, Oral, Q12H  ampicillin-sulbactam, 3 g, Intravenous, Q6H  desmopressin, 1 mcg, Subcutaneous, Q12H  enoxaparin, 60 mg, Subcutaneous, BID  hydrALAZINE, 50 mg, Oral, Q8H  metoprolol tartrate, 50 mg, Oral, Q12H  mupirocin, 1 Application, Each Nare, BID  senna-docusate sodium, 2 tablet, Oral, BID  sodium chloride, 2 spray, Each Nare, Q2H While Awake      IV Meds:          Results Reviewed:   I have personally reviewed the results from the time of this admission to 2024 11:16 EST     Results from last 7 days   Lab Units 24  0443 24  3394  11/08/24  1758 11/08/24  1153 11/08/24  0602 11/07/24  1244 11/07/24  0559 11/06/24  1158 11/06/24  0506   SODIUM mmol/L 139 134* 136   < > 138  138   < > 136  136   < > 142  142   POTASSIUM mmol/L 3.9  --   --   --  3.7  --  3.9  --  4.2   CHLORIDE mmol/L 102  --   --   --  99  --  101  --  106   CO2 mmol/L 29.3*  --   --   --  28.0  --  29.7*  --  26.6   BUN mg/dL 12  --   --   --  11  --  9  --  6   CREATININE mg/dL 0.39*  --   --   --  0.40*  --  0.38*  --  0.49*   CALCIUM mg/dL 8.9  --   --   --  8.3*  --  8.4*  --  8.4*   BILIRUBIN mg/dL  --   --   --   --  0.4  --  0.3  --  <0.2   ALK PHOS U/L  --   --   --   --  102  --  94  --  92   ALT (SGPT) U/L  --   --   --   --  11  --  10  --  12   AST (SGOT) U/L  --   --   --   --  9  --  12  --  14   GLUCOSE mg/dL 102*  --   --   --  96  --  108*  --  137*    < > = values in this interval not displayed.       Estimated Creatinine Clearance: 554.9 mL/min (A) (by C-G formula based on SCr of 0.39 mg/dL (L)).    Results from last 7 days   Lab Units 11/09/24  0443 11/08/24  0602 11/08/24  0006 11/07/24  1514 11/07/24  0559   MAGNESIUM mg/dL 2.2 2.1  --   --  2.2   PHOSPHORUS mg/dL 3.3  --  2.6 2.2* 2.1*       Results from last 7 days   Lab Units 11/09/24  0443 11/08/24  0602 11/07/24  0559   URIC ACID mg/dL 3.6 3.7 3.3*       Results from last 7 days   Lab Units 11/09/24  0443 11/08/24  0602 11/07/24  0559 11/06/24  0505 11/05/24  1314   WBC 10*3/mm3 10.72 11.17* 11.60* 15.40* 8.73   HEMOGLOBIN g/dL 9.3* 9.3* 9.4* 9.6* 10.4*   PLATELETS 10*3/mm3 386 351 350 390 382             Assessment / Plan     ASSESSMENT:  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   Super morbid obese  Hypertension controlled with nicardipine drip  Pituitary adenoma status post transnasal transsphenoidal resection on 11/5/2024.    PLAN:  Continue desmopressin 1 mcg twice daily since the  urine output remains elevated.  Surveillance labs    I reviewed the chart and other providers notes, reviewed labs.  I discussed the case with the patient and his mother at the bedside   Copied text in this note has been reviewed and is accurate as of 11/09/24.       Thank you for involving us in the care of Johnny Duran.  Please feel free to call with any questions.    Rene Garcia MD  11/09/24  11:16 [facility]    [facility]  [phone]    Please note that portions of this note were completed with a voice recognition program.

## 2024-11-09 NOTE — PLAN OF CARE
Goal Outcome Evaluation:  Plan of Care Reviewed With: patient        Progress: improving          VSS stable overnight and bloos pressure maintained within defined goal. No acute complications. Minimal sanguinous nasal drainage overnight. Nasal care completed q4hrs. Unable to wean oxygen off overnight due to sleep apnea. Mother remained at bedside overnight. Hourly urine output charted. No complaints of headache or vision changes reported. No signs of CSF drainage assessed. Patient able to reposition self overnight.       Problem: Adult Inpatient Plan of Care  Goal: Plan of Care Review  Outcome: Progressing  Flowsheets (Taken 11/9/2024 0657)  Progress: improving  Plan of Care Reviewed With: patient  Goal: Patient-Specific Goal (Individualized)  Outcome: Progressing  Goal: Absence of Hospital-Acquired Illness or Injury  Outcome: Progressing  Intervention: Identify and Manage Fall Risk  Recent Flowsheet Documentation  Taken 11/9/2024 0600 by Haris Law II RN  Safety Promotion/Fall Prevention:   activity supervised   safety round/check completed   assistive device/personal items within reach   clutter free environment maintained   fall prevention program maintained   muscle strengthening facilitated   nonskid shoes/slippers when out of bed   room organization consistent  Taken 11/9/2024 0500 by Haris Law II RN  Safety Promotion/Fall Prevention:   activity supervised   safety round/check completed   assistive device/personal items within reach   clutter free environment maintained   fall prevention program maintained   muscle strengthening facilitated   nonskid shoes/slippers when out of bed   room organization consistent  Taken 11/9/2024 0400 by Haris Law II RN  Safety Promotion/Fall Prevention:   activity supervised   safety round/check completed   assistive device/personal items within reach   clutter free environment maintained   fall prevention program maintained   muscle strengthening facilitated    nonskid shoes/slippers when out of bed   room organization consistent  Taken 11/9/2024 0300 by Haris Law II RN  Safety Promotion/Fall Prevention:   activity supervised   safety round/check completed   assistive device/personal items within reach   clutter free environment maintained   fall prevention program maintained   muscle strengthening facilitated   nonskid shoes/slippers when out of bed   room organization consistent  Taken 11/9/2024 0200 by Haris Law II, RN  Safety Promotion/Fall Prevention:   activity supervised   safety round/check completed   assistive device/personal items within reach   clutter free environment maintained   fall prevention program maintained   muscle strengthening facilitated   nonskid shoes/slippers when out of bed   room organization consistent  Taken 11/9/2024 0100 by Haris Law II RN  Safety Promotion/Fall Prevention:   activity supervised   safety round/check completed   assistive device/personal items within reach   clutter free environment maintained   fall prevention program maintained   muscle strengthening facilitated   nonskid shoes/slippers when out of bed   room organization consistent  Taken 11/9/2024 0000 by Haris Law II RN  Safety Promotion/Fall Prevention:   activity supervised   safety round/check completed   assistive device/personal items within reach   clutter free environment maintained   fall prevention program maintained   muscle strengthening facilitated   nonskid shoes/slippers when out of bed   room organization consistent  Taken 11/8/2024 2300 by Haris Law II RN  Safety Promotion/Fall Prevention:   activity supervised   safety round/check completed   assistive device/personal items within reach   clutter free environment maintained   fall prevention program maintained   muscle strengthening facilitated   nonskid shoes/slippers when out of bed   room organization consistent  Taken 11/8/2024 2200 by Haris Law II, RN  Safety  Promotion/Fall Prevention:   activity supervised   safety round/check completed   assistive device/personal items within reach   clutter free environment maintained   fall prevention program maintained   muscle strengthening facilitated   nonskid shoes/slippers when out of bed   room organization consistent  Taken 11/8/2024 2100 by Haris Law II, RN  Safety Promotion/Fall Prevention:   activity supervised   safety round/check completed   assistive device/personal items within reach   clutter free environment maintained   fall prevention program maintained   muscle strengthening facilitated   nonskid shoes/slippers when out of bed   room organization consistent  Taken 11/8/2024 2000 by Haris Law II, RN  Safety Promotion/Fall Prevention:   activity supervised   safety round/check completed   assistive device/personal items within reach   clutter free environment maintained   fall prevention program maintained   muscle strengthening facilitated   nonskid shoes/slippers when out of bed   room organization consistent  Intervention: Prevent Skin Injury  Recent Flowsheet Documentation  Taken 11/9/2024 0600 by Haris Law II, RN  Body Position: position changed independently  Taken 11/9/2024 0500 by Haris Law II, RN  Body Position: position changed independently  Taken 11/9/2024 0400 by Haris Law II, RN  Body Position:   position changed independently   turned   weight shifting  Taken 11/9/2024 0300 by Haris Law II, RN  Body Position:   position changed independently   position maintained  Taken 11/9/2024 0200 by Haris Law II, RN  Body Position:   position changed independently   turned   weight shifting  Taken 11/9/2024 0100 by Haris Law II, RN  Body Position:   position changed independently   weight shifting   turned   left  Taken 11/9/2024 0000 by Haris Law II, RN  Body Position: position maintained  Taken 11/8/2024 2300 by Haris Law II, RN  Body Position:   position changed  independently   weight shifting  Taken 11/8/2024 2200 by Haris Law II, RN  Body Position:   position changed independently   weight shifting   turned  Taken 11/8/2024 2100 by Haris Law II, RN  Body Position:   position changed independently   turned   weight shifting  Taken 11/8/2024 2000 by Haris Law II, RN  Body Position:   position changed independently   turned   heels elevated  Skin Protection:   transparent dressing maintained   protective footwear used   incontinence pads utilized  Intervention: Prevent and Manage VTE (Venous Thromboembolism) Risk  Recent Flowsheet Documentation  Taken 11/9/2024 0000 by Haris Law II, RN  VTE Prevention/Management: (lovenox subQ) patient refused intervention  Taken 11/8/2024 2000 by Haris Law II, RN  VTE Prevention/Management: patient refused intervention  Intervention: Prevent Infection  Recent Flowsheet Documentation  Taken 11/9/2024 0600 by Haris Law II, RN  Infection Prevention:   personal protective equipment utilized   hand hygiene promoted   rest/sleep promoted   single patient room provided  Taken 11/9/2024 0500 by Haris Law II, RN  Infection Prevention:   personal protective equipment utilized   hand hygiene promoted   rest/sleep promoted   single patient room provided  Taken 11/9/2024 0400 by Haris Law II, RN  Infection Prevention:   personal protective equipment utilized   hand hygiene promoted   rest/sleep promoted   single patient room provided  Taken 11/9/2024 0300 by Haris Law II, RN  Infection Prevention:   personal protective equipment utilized   hand hygiene promoted   rest/sleep promoted   single patient room provided  Taken 11/9/2024 0200 by Haris Law II, RN  Infection Prevention:   personal protective equipment utilized   hand hygiene promoted   rest/sleep promoted   single patient room provided  Taken 11/9/2024 0100 by Haris Law II, RN  Infection Prevention:   personal protective equipment utilized   hand  hygiene promoted   rest/sleep promoted   single patient room provided  Taken 11/9/2024 0000 by Haris Law II, RN  Infection Prevention:   personal protective equipment utilized   hand hygiene promoted   rest/sleep promoted   single patient room provided  Taken 11/8/2024 2300 by Haris Law II, RN  Infection Prevention:   personal protective equipment utilized   hand hygiene promoted   rest/sleep promoted   single patient room provided  Taken 11/8/2024 2200 by Haris Law II, RN  Infection Prevention:   personal protective equipment utilized   hand hygiene promoted   rest/sleep promoted   single patient room provided  Taken 11/8/2024 2100 by Haris Law II, RN  Infection Prevention:   personal protective equipment utilized   hand hygiene promoted   rest/sleep promoted   single patient room provided  Taken 11/8/2024 2000 by Haris Law II, RN  Infection Prevention:   personal protective equipment utilized   hand hygiene promoted   rest/sleep promoted   single patient room provided  Goal: Optimal Comfort and Wellbeing  Outcome: Progressing  Intervention: Monitor Pain and Promote Comfort  Recent Flowsheet Documentation  Taken 11/8/2024 2000 by Haris Law II, RN  Pain Management Interventions:   care clustered   pain management plan reviewed with patient/caregiver   medication offered but refused  Intervention: Provide Person-Centered Care  Recent Flowsheet Documentation  Taken 11/9/2024 0400 by Haris Law II, RN  Trust Relationship/Rapport:   care explained   choices provided   emotional support provided   empathic listening provided   questions answered   questions encouraged   reassurance provided   thoughts/feelings acknowledged  Taken 11/8/2024 2000 by Haris Law II, RN  Trust Relationship/Rapport:   care explained   choices provided   emotional support provided   empathic listening provided   questions answered   questions encouraged   reassurance provided   thoughts/feelings acknowledged  Goal:  Readiness for Transition of Care  Outcome: Progressing     Problem: Skin Injury Risk Increased  Goal: Skin Health and Integrity  Outcome: Progressing  Intervention: Optimize Skin Protection  Recent Flowsheet Documentation  Taken 11/9/2024 0600 by Haris Law II, RN  Head of Bed (HOB) Positioning: HOB at 30-45 degrees  Taken 11/9/2024 0500 by Haris Law II, RN  Head of Bed (HOB) Positioning: HOB at 30-45 degrees  Taken 11/9/2024 0400 by Haris Law II, RN  Pressure Reduction Techniques:   frequent weight shift encouraged   weight shift assistance provided  Head of Bed (HOB) Positioning: HOB at 30-45 degrees  Pressure Reduction Devices:   specialty bed utilized   pressure-redistributing mattress utilized  Taken 11/9/2024 0300 by Haris Law II, RN  Head of Bed (HOB) Positioning: HOB at 30-45 degrees  Taken 11/9/2024 0200 by Haris Law II, RN  Head of Bed (HOB) Positioning: HOB at 30-45 degrees  Taken 11/9/2024 0100 by Haris Law II, RN  Head of Bed (HOB) Positioning: HOB at 30-45 degrees  Taken 11/9/2024 0000 by Haris Law II, RN  Pressure Reduction Techniques:   frequent weight shift encouraged   weight shift assistance provided  Head of Bed (HOB) Positioning: HOB at 30-45 degrees  Pressure Reduction Devices:   pressure-redistributing mattress utilized   specialty bed utilized  Taken 11/8/2024 2300 by Haris Law II, RN  Head of Bed (HOB) Positioning: HOB at 30-45 degrees  Taken 11/8/2024 2200 by Haris Law II, RN  Head of Bed (HOB) Positioning: HOB at 30-45 degrees  Taken 11/8/2024 2100 by Haris Law II, RN  Head of Bed (HOB) Positioning: HOB at 30-45 degrees  Taken 11/8/2024 2000 by Haris Law II, RN  Activity Management: activity encouraged  Pressure Reduction Techniques:   frequent weight shift encouraged   heels elevated off bed   weight shift assistance provided  Head of Bed (HOB) Positioning: HOB at 30-45 degrees  Pressure Reduction Devices:   specialty bed utilized    pressure-redistributing mattress utilized  Skin Protection:   transparent dressing maintained   protective footwear used   incontinence pads utilized

## 2024-11-09 NOTE — PLAN OF CARE
Problem: Adult Inpatient Plan of Care  Goal: Plan of Care Review  Outcome: Progressing  Flowsheets (Taken 11/9/2024 1724)  Outcome Evaluation: monitoring close I/Os, pt complains of no pain, nasal saline to keep packing moist, a/0x4, no neurologic changes after transfer from ICU  Goal: Patient-Specific Goal (Individualized)  Outcome: Progressing  Goal: Absence of Hospital-Acquired Illness or Injury  Outcome: Progressing  Intervention: Identify and Manage Fall Risk  Description: Perform standard risk assessment on admission using a validated tool or comprehensive approach appropriate to the patient; reassess fall risk frequently, with change in status or transfer to another level of care.  Communicate risk to interprofessional healthcare team; ensure fall risk visible cue.  Determine need for increased observation, equipment and environmental modification, as well as use of supportive, nonskid footwear.  Adjust safety measures to individual needs and identified risk factors.  Reinforce the importance of active participation with fall risk prevention, safety, and physical activity with the patient and family.  Perform regular intentional rounding to assess need for position change, pain assessment and personal needs, including assistance with toileting.  Recent Flowsheet Documentation  Taken 11/9/2024 1436 by Mirella Flower, RN  Safety Promotion/Fall Prevention:   activity supervised   safety round/check completed  Taken 11/9/2024 1330 by Mirella Flower, IRAIDA  Safety Promotion/Fall Prevention:   activity supervised   safety round/check completed  Intervention: Prevent and Manage VTE (Venous Thromboembolism) Risk  Description: Assess for VTE (venous thromboembolism) risk.  Promote early mobilization; encourage both active and passive leg exercises, if unable to ambulate.  Initiate and maintain compression or other therapy, as indicated, based on identified risk in accordance with organizational protocol and provider  order.  Recognize the patient's individual risk for bleeding before initiating pharmacologic thromboprophylaxis.  Recent Flowsheet Documentation  Taken 11/9/2024 1330 by Mirella Flower RN  VTE Prevention/Management:   bilateral   SCDs (sequential compression devices) on  Intervention: Prevent Infection  Description: Maintain skin and mucous membrane integrity; promote hand, oral and pulmonary hygiene.  Optimize fluid balance, nutrition, sleep and glycemic control to maximize infection resistance.  Identify potential sources of infection early to prevent or mitigate progression of infection (e.g., wound, lines, devices).  Evaluate ongoing need for invasive devices; remove promptly when no longer indicated.  Review vaccination status.  Recent Flowsheet Documentation  Taken 11/9/2024 1436 by Mirella Flower RN  Infection Prevention:   cohorting utilized   environmental surveillance performed  Taken 11/9/2024 1330 by Mirella Flower RN  Infection Prevention:   cohorting utilized   environmental surveillance performed  Goal: Optimal Comfort and Wellbeing  Outcome: Progressing  Intervention: Monitor Pain and Promote Comfort  Description: Assess pain level, treatment efficacy and patient response at regular intervals using a consistent pain scale.  Consider the presence and impact of preexisting chronic pain.  Encourage patient and caregiver involvement in pain assessment, interventions and safety measures.  Promote activity; balance with sleep and rest to enhance healing.  Recent Flowsheet Documentation  Taken 11/9/2024 1330 by Mirella Flower RN  Pain Management Interventions: care clustered  Intervention: Provide Person-Centered Care  Description: Use a family-focused approach to care; encourage support system presence and participation.  Develop trust and rapport by proactively providing information, encouraging questions, addressing concerns and offering reassurance.  Acknowledge emotional response to hospitalization.  Recognize  and utilize personal coping strategies and strengths; develop goals via shared decision-making.  Honor spiritual and cultural preferences.  Recent Flowsheet Documentation  Taken 11/9/2024 1330 by Mirella Flower RN  Trust Relationship/Rapport:   care explained   choices provided   emotional support provided  Goal: Readiness for Transition of Care  Outcome: Progressing   Goal Outcome Evaluation:              Outcome Evaluation: monitoring close I/Os, pt complains of no pain, nasal saline to keep packing moist, a/0x4, no neurologic changes after transfer from ICU

## 2024-11-09 NOTE — PLAN OF CARE
Goal Outcome Evaluation:  Plan of Care Reviewed With: patient, parent           Outcome Evaluation: Pt is a 35 yo male adm for transphenoidal resection of pituitary adenoma, he is seen in ICU, sittin gup in a chair, anxious to walk, pt was able to stand and walk 200 ft with stand by assist, normal gait pattern and balance, no further PT needs, pt is safe to walk with nursing staff until discharge    Anticipated Discharge Disposition (PT): home

## 2024-11-09 NOTE — THERAPY EVALUATION
Patient Name: Johnny Duran  : 1990    MRN: [id number]                              Today's Date: 2024       Admit Date: 2024    Visit Dx:     ICD-10-CM ICD-9-CM   1. Pituitary macroadenoma with extrasellar extension  D35.2 227.3     Patient Active Problem List   Diagnosis    Class 3 severe obesity due to excess calories with serious comorbidity and body mass index (BMI) of 60.0 to 69.9 in adult    Nondisplaced fracture of fifth left metatarsal bone    Traumatic arthritis of right knee    Pain in left foot    Vitamin D deficiency    Testosterone deficiency    Plantar fasciitis of right foot    COVID-19    Meralgia paresthetica of left side    Pituitary macroadenoma with extrasellar extension     Past Medical History:   Diagnosis Date    Anemia     Dizziness     Encounter for screening for malignant neoplasm of rectum     Gallstones     Nausea and vomiting in adult     Nondisplaced fracture of fifth left metatarsal bone     Nondisplaced longitudinal fracture of right patella, initial encounter for closed fracture     Impression: recommend ortho eval, appt today.    Obesity     Obesity, morbid, BMI 40.0-49.9     Overweight (BMI 25.0-29.9)     Plantar fasciitis     right    Screening for depression     Screening for hyperlipidemia     Stress fracture     Traumatic arthritis of right knee      Past Surgical History:   Procedure Laterality Date    CHOLECYSTECTOMY      ORIF FOOT FRACTURE Left 2016    PLANTAR FASCIA RELEASE Right 10/03/2022    Procedure: ENDOSCOPIC PLANTAR FASCIOTOMY;  Surgeon: CASEY Berman DPM;  Location: [facility] MAIN OR;  Service: Podiatry;  Laterality: Right;    TRANSSPHENOIDAL ENDOSCOPIC N/A 2024    Procedure: TRANSSPHENOIDAL RESECTION OF PITUITARY ADENOMA WITH STEALTH;  Surgeon: Juan Frank MD;  Location: [facility] MAIN OR;  Service: Neurosurgery;  Laterality: N/A;    TRANSSPHENOIDAL EXPOSURE Bilateral 2024    Procedure: TRANSSPHENOIDAL RESECTION OF PITUITARY TUMOR WITH  NEUROSURGERY, RIGHT DANIEL BULLOSA RESECTION, RIGHT MAXILLARY ANTROSTOMY, POSSIBLE SEPTOPLASTY, POSSIBLE INFERIOR TURBINATE REDUCTION, POSSIBLE NASO-SEPTAL FLAP, AND IMAGE GUIDANCE;  Surgeon: Kendall Murillo MD;  Location: [facility];  Service: ENT;  Laterality: Bilateral;      General Information       Row Name 11/09/24 1256          Physical Therapy Time and Intention    Document Type evaluation  -PC     Mode of Treatment physical therapy  -PC       Row Name 11/09/24 1256          General Information    Patient Profile Reviewed yes  -PC     Prior Level of Function independent:  -PC               User Key  (r) = Recorded By, (t) = Taken By, (c) = Cosigned By      Initials Name Provider Type    PC Diane, Jammie G, PT Physical Therapist                   Mobility       Row Name 11/09/24 1312          Bed Mobility    Comment, (Bed Mobility) pt up in chair  -PC       Row Name 11/09/24 1312          Sit-Stand Transfer    Sit-Stand [place] (Transfers) independent  -PC       Row Name 11/09/24 1312          Gait/Stairs (Locomotion)    [place] Level (Gait) independent  -PC     Distance in Feet (Gait) 150  -PC     Deviations/Abnormal Patterns (Gait) base of support, wide  -PC               User Key  (r) = Recorded By, (t) = Taken By, (c) = Cosigned By      Initials Name Provider Type    PC Diane, Jammie G, PT Physical Therapist                   Obj/Interventions       Row Name 11/09/24 1313          Range of Motion Comprehensive    General Range of Motion no range of motion deficits identified  -PC       Row Name 11/09/24 1313          Strength Comprehensive (MMT)    General Manual Muscle Testing (MMT) Assessment no strength deficits identified  -PC       Row Name 11/09/24 1313          Balance    Comment, Balance WNL  -PC               User Key  (r) = Recorded By, (t) = Taken By, (c) = Cosigned By      Initials Name Provider Type    PC Diane, Jammie G, PT Physical Therapist                   Goals/Plan    No  documentation.                  Clinical Impression       Row Name 11/09/24 1313          Pain    Pretreatment Pain Rating 0/10 - no pain  -PC     Posttreatment Pain Rating 0/10 - no pain  -PC       Row Name 11/09/24 1313          Plan of Care Review    Plan of Care Reviewed With patient;parent  -PC     Outcome Evaluation Pt is a 33 yo male adm for transphenoidal resection of pituitary adenoma, he is seen in ICU, sittin gup in a chair, anxious to walk, pt was able to stand and walk 200 ft with stand by assist, normal gait pattern and balance, no further PT needs, pt is safe to walk with nursing staff until discharge  -PC       Row Name 11/09/24 1313          Therapy Assessment/Plan (PT)    Criteria for Skilled Interventions Met (PT) no problems identified which require skilled intervention  -PC     Therapy Frequency (PT) evaluation only  -PC       Row Name 11/09/24 1313          Positioning and Restraints    Pre-Treatment Position sitting in chair/recliner  -PC     Post Treatment Position chair  -PC     In Chair sitting;call light within reach;encouraged to call for assist;with family/caregiver  -PC               User Key  (r) = Recorded By, (t) = Taken By, (c) = Cosigned By      Initials Name Provider Type    PC Jammie Contreras, PT Physical Therapist                   Outcome Measures       Row Name 11/09/24 1315          How much help from another person do you currently need...    Turning from your back to your side while in flat bed without using bedrails? 4  -PC     Moving from lying on back to sitting on the side of a flat bed without bedrails? 4  -PC     Moving to and from a bed to a chair (including a wheelchair)? 4  -PC     Standing up from a chair using your arms (e.g., wheelchair, bedside chair)? 4  -PC     Climbing 3-5 steps with a railing? 4  -PC     To walk in hospital room? 4  -PC     AM-PAC 6 Clicks Score (PT) 24  -PC               User Key  (r) = Recorded By, (t) = Taken By, (c) = Cosigned By       Initials Name Provider Type    PC Jammie Contreras, PT Physical Therapist                                 Physical Therapy Education       Title: PT OT SLP Therapies (In Progress)       Topic: Physical Therapy (In Progress)       Point: Mobility training (Done)       Learning Progress Summary            Patient Acceptance, E, DU by PC at 11/9/2024 1316                      Point: Home exercise program (Not Started)       Learner Progress:  Not documented in this visit.              Point: Body mechanics (Done)       Learning Progress Summary            Patient Acceptance, E, DU by PC at 11/9/2024 1316                      Point: Precautions (Done)       Learning Progress Summary            Patient Acceptance, E, DU by PC at 11/9/2024 1316                                      User Key       Initials Effective Dates Name Provider Type Discipline     06/16/21 -  Jammie Contreras PT Physical Therapist PT                  PT Recommendation and Plan     Outcome Evaluation: Pt is a 33 yo male adm for transphenoidal resection of pituitary adenoma, he is seen in ICU, sittin gup in a chair, anxious to walk, pt was able to stand and walk 200 ft with stand by assist, normal gait pattern and balance, no further PT needs, pt is safe to walk with nursing staff until discharge     Time Calculation:         PT Charges       Row Name 11/09/24 1316             Time Calculation    Start Time 1055  -PC      Stop Time 1105  -PC      Time Calculation (min) 10 min  -PC      PT Received On 11/09/24  -PC                User Key  (r) = Recorded By, (t) = Taken By, (c) = Cosigned By      Initials Name Provider Type    PC Jammie Contreras, PT Physical Therapist                  Therapy Charges for Today       Code Description Service Date Service Provider Modifiers Qty    40216749760 HC PT EVAL LOW COMPLEXITY 3 11/9/2024 Jammie Contreras, PT GP 1            PT G-Codes  AM-PAC 6 Clicks Score (PT): 24  PT Discharge Summary  Anticipated Discharge  Disposition (PT): home    Jammie Contreras, PT  11/9/2024

## 2024-11-09 NOTE — PROGRESS NOTES
[facility] INPATIENT PROGRESS NOTE         [facility] INTENSIVE CARE    2024      PATIENT IDENTIFICATION:  Name: Johnny Duran ADMIT: 2024   : 1990  PCP: Edouard Vazquez MD    MRN: [id number] LOS: 4 days   AGE/SEX: 34 y.o. male  ROOM: [facility]                     LOS 4    Reason for visit: Postop pituitary macroadenoma resection      SUBJECTIVE:      Resting comfortably.  Off Cardene drip.  Can transfer out of intensive care from my standpoint.      Objective   OBJECTIVE:    Vital Sign Min/Max for last 24 hours  Temp  Min: 97.6 °F (36.4 °C)  Max: 98.7 °F (37.1 °C)   BP  Min: 118/66  Max: 154/80   Pulse  Min: 81  Max: 93   Resp  Min: 10  Max: 18   SpO2  Min: 86 %  Max: 100 %   No data recorded   Weight  Min: 245 kg (540 lb 2 oz)  Max: 247 kg (545 lb 9.6 oz)    Vitals:    24 0417 24 0500 24 0600 24 0700   BP:  130/75 137/75 130/65   BP Location:  Right arm Right arm Right arm   Patient Position:    Lying   Pulse:  85 81 89   Resp:  14 12 12   Temp:    97.6 °F (36.4 °C)   TempSrc:    Oral   SpO2:  94% 100% 99%   Weight: (!) 247 kg (545 lb 9.6 oz)      Height:                24  0600 24  2127 247   Weight: (!) 242 kg (534 lb 9.6 oz) (!) 245 kg (540 lb 2 oz) (!) 247 kg (545 lb 9.6 oz)       Body mass index is 71.01 kg/m².                          Body mass index is 71.01 kg/m².    Intake/Output Summary (Last 24 hours) at 2024 0924  Last data filed at 2024 0732  Gross per 24 hour   Intake 1085 ml   Output 3705 ml   Net -2620 ml         Exam:  GEN:  No distress, appears stated age  EYES:   PERRL, anicteric sclerae  ENT:    External ears/nose normal, OP clear  NECK:  No adenopathy, midline trachea  LUNGS: Normal chest on inspection, palpation and auscultation  CV:  Normal S1S2, without murmur  ABD:  Nontender, nondistended, no hepatosplenomegaly, +BS  EXT:  No edema.  No cyanosis or clubbing.  No mottling and normal cap  refill.    Assessment     Scheduled meds:  amLODIPine, 10 mg, Oral, Q24H  [START ON 11/10/2024] amoxicillin-clavulanate, 1 tablet, Oral, Q12H  ampicillin-sulbactam, 3 g, Intravenous, Q6H  desmopressin, 1 mcg, Subcutaneous, Q12H  enoxaparin, 60 mg, Subcutaneous, BID  hydrALAZINE, 50 mg, Oral, Q8H  metoprolol tartrate, 50 mg, Oral, Q12H  mupirocin, 1 Application, Each Nare, BID  senna-docusate sodium, 2 tablet, Oral, BID  sodium chloride, 2 spray, Each Nare, Q2H While Awake      IV meds:                           Data Review:  Results from last 7 days   Lab Units 11/09/24  0443 11/08/24  2342 11/08/24  1758 11/08/24  1153 11/08/24  0602 11/07/24  1244 11/07/24  0559 11/06/24  1158 11/06/24  0506 11/05/24  2300   SODIUM mmol/L 139 134* 136 139 138  138   < > 136  136   < > 142  142 141  140   POTASSIUM mmol/L 3.9  --   --   --  3.7  --  3.9  --  4.2 4.1   CHLORIDE mmol/L 102  --   --   --  99  --  101  --  106 106   CO2 mmol/L 29.3*  --   --   --  28.0  --  29.7*  --  26.6 24.8   BUN mg/dL 12  --   --   --  11  --  9  --  6 7   CREATININE mg/dL 0.39*  --   --   --  0.40*  --  0.38*  --  0.49* 0.51*   GLUCOSE mg/dL 102*  --   --   --  96  --  108*  --  137* 153*   CALCIUM mg/dL 8.9  --   --   --  8.3*  --  8.4*  --  8.4* 8.7    < > = values in this interval not displayed.         Estimated Creatinine Clearance: 554.9 mL/min (A) (by C-G formula based on SCr of 0.39 mg/dL (L)).  Results from last 7 days   Lab Units 11/09/24 0443 11/08/24  0602 11/07/24  0559 11/06/24  0505 11/05/24  1314   WBC 10*3/mm3 10.72 11.17* 11.60* 15.40* 8.73   HEMOGLOBIN g/dL 9.3* 9.3* 9.4* 9.6* 10.4*   PLATELETS 10*3/mm3 386 351 350 390 382         Results from last 7 days   Lab Units 11/08/24  0602 11/07/24  0559 11/06/24  0506 11/05/24  1314   ALT (SGPT) U/L 11 10 12 14   AST (SGOT) U/L 9 12 14 14                 Glucose   Date/Time Value Ref Range Status   11/09/2024 0721 138 (H) 70 - 130 mg/dL Final   11/09/2024 0351 101 70 - 130 mg/dL  Final   11/08/2024 2341 100 70 - 130 mg/dL Final   11/08/2024 1953 92 70 - 130 mg/dL Final   11/08/2024 1642 120 70 - 130 mg/dL Final   11/08/2024 1200 109 70 - 130 mg/dL Final   11/08/2024 0757 102 70 - 130 mg/dL Final                 Active Hospital Problems    Diagnosis  POA    **Pituitary macroadenoma with extrasellar extension [D35.2]  Unknown      Resolved Hospital Problems   No resolved problems to display.         ASSESSMENT:  Pituitary macroadenoma status post transsphenoidal resection (11/5)  Diabetes insipidus  Super morbid obesity (BMI 67)  Hyperglycemia  Anemia      PLAN:  Neurochecks per protocol.  Desmopressin per nephrology orders Q8.    Cardene drip is off.  On oral antihypertensives.  Control glucose.  DVT prophylaxis.      Can transfer out of intensive care.    Delfino Coles MD  Pulmonary and Critical Care Medicine  [facility], [facility]  11/9/2024    09:24 EST

## 2024-11-09 NOTE — PROGRESS NOTES
[facility] NEUROSURGERY INTRACRANIAL POSTOP note    PATIENT IDENTIFICATION:   Name:  Johnny Duran      MRN:  [id number]     34 y.o.  male               CC:POD 4 transsphenoidal resection pituitary adenoma with muscle tensor fascia graft and right leg fat graft       Subjective     Interval History:  Denies headache or visual changes. Cardene off. Some scant bloody drainage from nares. Ongoing chavez and DDAVP per nephrology. DDAVP dose lowered yesterday. UO has increased some this morning.    Objective     Vital signs in last 24 hours:  Temp:  [97.6 °F (36.4 °C)-98.7 °F (37.1 °C)] 97.6 °F (36.4 °C)  Heart Rate:  [81-93] 89  Resp:  [10-18] 12  BP: (118-154)/(63-82) 130/65      Intake/Output this shift:  I/O this shift:  In: 150 [P.O.:50; IV Piggyback:100]  Out: 875 [Urine:875]    Intake/Output last 3 shifts:  I/O last 3 completed shifts:  In: 1737 [P.O.:50; I.V.:1387; IV Piggyback:300]  Out: 5730 [Urine:5730]      LABS:  Results from last 7 days   Lab Units 11/09/24  0443 11/08/24  0602 11/07/24  0559   WBC 10*3/mm3 10.72 11.17* 11.60*   HEMOGLOBIN g/dL 9.3* 9.3* 9.4*   HEMATOCRIT % 28.9* 28.5* 29.5*   PLATELETS 10*3/mm3 386 351 350      Results from last 7 days   Lab Units 11/09/24  0443 11/08/24  2342 11/08/24  1758 11/08/24  1153 11/08/24  0602 11/07/24  1244 11/07/24  0559   SODIUM mmol/L 139 134* 136   < > 138  138   < > 136  136   POTASSIUM mmol/L 3.9  --   --   --  3.7  --  3.9   CHLORIDE mmol/L 102  --   --   --  99  --  101   CO2 mmol/L 29.3*  --   --   --  28.0  --  29.7*   BUN mg/dL 12  --   --   --  11  --  9   CREATININE mg/dL 0.39*  --   --   --  0.40*  --  0.38*   GLUCOSE mg/dL 102*  --   --   --  96  --  108*   CALCIUM mg/dL 8.9  --   --   --  8.3*  --  8.4*    < > = values in this interval not displayed.     Osmolality 286 x3  Urine specific gravity 1.016 to 1.019 to 1.015 (current)    IMAGING STUDIES:  No new imaging    I personally viewed and interpreted the patient's chart.    Meds  reviewed/changed: Yes    Current Facility-Administered Medications:     acetaminophen (TYLENOL) tablet 650 mg, 650 mg, Oral, Q4H PRN **OR** [DISCONTINUED] acetaminophen (TYLENOL) 160 MG/5ML oral solution 650 mg, 650 mg, Oral, Q4H PRN **OR** [DISCONTINUED] acetaminophen (TYLENOL) suppository 650 mg, 650 mg, Rectal, Q4H PRN, Juan Frank MD    amLODIPine (NORVASC) tablet 10 mg, 10 mg, Oral, Q24H, Justina Mcbride APRN, 10 mg at 11/09/24 0915    [START ON 11/10/2024] amoxicillin-clavulanate (AUGMENTIN) 875-125 MG per tablet 1 tablet, 1 tablet, Oral, Q12H, Justina Mcbride APRN    ampicillin-sulbactam (UNASYN) 3 g in sodium chloride 0.9 % 100 mL MBP, 3 g, Intravenous, Q6H, Justina Mcbride APRN, 3 g at 11/09/24 0629    sennosides-docusate (PERICOLACE) 8.6-50 MG per tablet 2 tablet, 2 tablet, Oral, BID, 2 tablet at 11/09/24 0915 **AND** polyethylene glycol (MIRALAX) packet 17 g, 17 g, Oral, Daily PRN **AND** bisacodyl (DULCOLAX) EC tablet 5 mg, 5 mg, Oral, Daily PRN **AND** bisacodyl (DULCOLAX) suppository 10 mg, 10 mg, Rectal, Daily PRN, Justina Mcbride APRN    Calcium Replacement - Follow Nurse / BPA Driven Protocol, , Does not apply, PRN, Justina Mcbride APRN    desmopressin (DDAVP) injection 1 mcg, 1 mcg, Subcutaneous, Q12H, Justina Mcbride APRN, 1 mcg at 11/08/24 2347    Enoxaparin Sodium (LOVENOX) syringe 60 mg, 60 mg, Subcutaneous, BID, Justina Mcbride APRN, 60 mg at 11/09/24 0915    hydrALAZINE (APRESOLINE) tablet 50 mg, 50 mg, Oral, Q8H, Justina Mcbride APRN, 50 mg at 11/09/24 0629    HYDROcodone-acetaminophen (NORCO) 5-325 MG per tablet 1 tablet, 1 tablet, Oral, Q4H PRN, Justina Mcbride APRN    Magnesium Standard Dose Replacement - Follow Nurse / BPA Driven Protocol, , Does not apply, PRN, Justina Mcbride APRN    metoprolol tartrate (LOPRESSOR) tablet 50 mg, 50 mg, Oral, Q12H, Justina Mcbride APRN, 50 mg at 11/09/24 0915    mupirocin (BACTROBAN) 2 % nasal ointment 1 Application, 1  Application, Each Nare, BID, Justina Mcbride APRN, 1 Application at 11/09/24 0915    nitroglycerin (NITROSTAT) SL tablet 0.4 mg, 0.4 mg, Sublingual, Q5 Min PRN, Justina Mcbride APRN    ondansetron ODT (ZOFRAN-ODT) disintegrating tablet 4 mg, 4 mg, Oral, Q6H PRN **OR** ondansetron (ZOFRAN) injection 4 mg, 4 mg, Intravenous, Q6H PRN, Justina Mcbride APRN    Phosphorus Replacement - Follow Nurse / BPA Driven Protocol, , Does not apply, PRN, Justina Mcbride APRN    Potassium Replacement - Follow Nurse / BPA Driven Protocol, , Does not apply, PRNReed Kelley R, APRN    sodium chloride nasal spray 2 spray, 2 spray, Each Nare, Q2H While Awake, Justina Mcbride APRN, 2 spray at 11/09/24 0917     Physical Exam:    General:   Awake, alert, oriented x3. Speech clear with no aphasia. Sitting up in chair.   HEENT:    No visibile drainage from nares  CN II:    Visual fields full to confrontation  CN III IV VI:   Extraocular movements are full , PERRL   CN VII:   Facial movements are symmetric. No weakness.  Motor:    Normal muscle strength, bulk and tone in upper and lower extremities.  No fasciculations, rigidity, spasticity, or abnormal movements.  Sensation:   Normal to light touch; no extinction  Extremities:   Wearing SCD. R thigh incision well appearing without redness drainage or swelling    Assessment & Plan     ASSESSMENT:      Pituitary macroadenoma with extrasellar extension    Patient is POD 4 transsphenoidal resection pituitary adenoma with muscle tensor fascia graft and right leg fat graft.  He has been able to get up out of bed and sit in chair without headache or increased nasal drainage.  Only having some scant bloody drainage from nares.  Intact on exam without any visual changes.  Able to wean off Cardene and he is free to transfer out of the ICU at any time.  Nephrology decreased desmopressin yesterday and he had an uptick in his urine output but his urine specific gravity remains stable.   "Will continue to defer this to them.  He is set to complete his course of Unasyn today and begin on Augmentin tomorrow per Dr. Murillo w/ ENT.     PLAN:   Okay to TTF  Ok to work w/ PT and OOB activity  RACHAEL Scott if okay with nephrology  Continue strict I's/O especially UOP every 4 hour  Return to bed rest if begins with upright headache or rhinorrhea  Keep nasal packing x 3 weeks per ENT  Complete course of unasyn today, begin Augmentin BID tomorrow x2 weeks  Cont nasal spray  SBP < 160  Outpatient open pituitary MRI +/- contrast once discharged      I discussed the patient's findings and my recommendations with patient, nursing staff, and Dr. Osborn    During patient visit, I utilized appropriate personal protective equipment including mask and gloves.  Mask used was standard procedure mask. Appropriate PPE was worn during the entire visit.  Hand hygiene was completed before and after.      LOS: 4 days       Nella Campos, APRN  11/9/2024  11:15 EST    \"Dictated utilizing Dragon dictation\".     "

## 2024-11-10 LAB
ALBUMIN SERPL-MCNC: 3.6 G/DL (ref 3.5–5.2)
ANION GAP SERPL CALCULATED.3IONS-SCNC: 9.8 MMOL/L (ref 5–15)
BASOPHILS # BLD AUTO: 0.03 10*3/MM3 (ref 0–0.2)
BASOPHILS NFR BLD AUTO: 0.3 % (ref 0–1.5)
BUN SERPL-MCNC: 17 MG/DL (ref 6–20)
BUN/CREAT SERPL: 32.1 (ref 7–25)
CALCIUM SPEC-SCNC: 9 MG/DL (ref 8.6–10.5)
CHLORIDE SERPL-SCNC: 102 MMOL/L (ref 98–107)
CO2 SERPL-SCNC: 27.2 MMOL/L (ref 22–29)
CORTIS SERPL-MCNC: 8.15 MCG/DL
CREAT SERPL-MCNC: 0.53 MG/DL (ref 0.76–1.27)
DEPRECATED RDW RBC AUTO: 42 FL (ref 37–54)
EGFRCR SERPLBLD CKD-EPI 2021: 134.9 ML/MIN/1.73
EOSINOPHIL # BLD AUTO: 0.54 10*3/MM3 (ref 0–0.4)
EOSINOPHIL NFR BLD AUTO: 4.5 % (ref 0.3–6.2)
ERYTHROCYTE [DISTWIDTH] IN BLOOD BY AUTOMATED COUNT: 13.5 % (ref 12.3–15.4)
GLUCOSE BLDC GLUCOMTR-MCNC: 102 MG/DL (ref 70–130)
GLUCOSE BLDC GLUCOMTR-MCNC: 105 MG/DL (ref 70–130)
GLUCOSE BLDC GLUCOMTR-MCNC: 108 MG/DL (ref 70–130)
GLUCOSE BLDC GLUCOMTR-MCNC: 112 MG/DL (ref 70–130)
GLUCOSE BLDC GLUCOMTR-MCNC: 113 MG/DL (ref 70–130)
GLUCOSE SERPL-MCNC: 99 MG/DL (ref 65–99)
HCT VFR BLD AUTO: 30.2 % (ref 37.5–51)
HGB BLD-MCNC: 9.8 G/DL (ref 13–17.7)
IMM GRANULOCYTES # BLD AUTO: 0.08 10*3/MM3 (ref 0–0.05)
IMM GRANULOCYTES NFR BLD AUTO: 0.7 % (ref 0–0.5)
LYMPHOCYTES # BLD AUTO: 2.79 10*3/MM3 (ref 0.7–3.1)
LYMPHOCYTES NFR BLD AUTO: 23.3 % (ref 19.6–45.3)
MAGNESIUM SERPL-MCNC: 2.2 MG/DL (ref 1.6–2.6)
MCH RBC QN AUTO: 28.3 PG (ref 26.6–33)
MCHC RBC AUTO-ENTMCNC: 32.5 G/DL (ref 31.5–35.7)
MCV RBC AUTO: 87.3 FL (ref 79–97)
MONOCYTES # BLD AUTO: 0.75 10*3/MM3 (ref 0.1–0.9)
MONOCYTES NFR BLD AUTO: 6.3 % (ref 5–12)
NEUTROPHILS NFR BLD AUTO: 64.9 % (ref 42.7–76)
NEUTROPHILS NFR BLD AUTO: 7.79 10*3/MM3 (ref 1.7–7)
NRBC BLD AUTO-RTO: 0 /100 WBC (ref 0–0.2)
OSMOLALITY SERPL: 285 MOSM/KG (ref 275–300)
OSMOLALITY SERPL: 291 MOSM/KG (ref 275–300)
OSMOLALITY SERPL: 292 MOSM/KG (ref 275–300)
OSMOLALITY SERPL: 294 MOSM/KG (ref 275–300)
PHOSPHATE SERPL-MCNC: 3.5 MG/DL (ref 2.5–4.5)
PLATELET # BLD AUTO: 435 10*3/MM3 (ref 140–450)
PMV BLD AUTO: 9.7 FL (ref 6–12)
POTASSIUM SERPL-SCNC: 4 MMOL/L (ref 3.5–5.2)
RBC # BLD AUTO: 3.46 10*6/MM3 (ref 4.14–5.8)
SODIUM SERPL-SCNC: 137 MMOL/L (ref 136–145)
SODIUM SERPL-SCNC: 138 MMOL/L (ref 136–145)
SODIUM SERPL-SCNC: 139 MMOL/L (ref 136–145)
SP GR UR STRIP: 1.02 (ref 1–1.03)
URATE SERPL-MCNC: 3.7 MG/DL (ref 3.4–7)
WBC NRBC COR # BLD AUTO: 11.98 10*3/MM3 (ref 3.4–10.8)

## 2024-11-10 PROCEDURE — 83735 ASSAY OF MAGNESIUM: CPT | Performed by: NURSE PRACTITIONER

## 2024-11-10 PROCEDURE — 82533 TOTAL CORTISOL: CPT | Performed by: NURSE PRACTITIONER

## 2024-11-10 PROCEDURE — 82948 REAGENT STRIP/BLOOD GLUCOSE: CPT

## 2024-11-10 PROCEDURE — 84550 ASSAY OF BLOOD/URIC ACID: CPT | Performed by: INTERNAL MEDICINE

## 2024-11-10 PROCEDURE — 83930 ASSAY OF BLOOD OSMOLALITY: CPT | Performed by: NURSE PRACTITIONER

## 2024-11-10 PROCEDURE — 81003 URINALYSIS AUTO W/O SCOPE: CPT | Performed by: NURSE PRACTITIONER

## 2024-11-10 PROCEDURE — 99024 POSTOP FOLLOW-UP VISIT: CPT

## 2024-11-10 PROCEDURE — 84295 ASSAY OF SERUM SODIUM: CPT | Performed by: NURSE PRACTITIONER

## 2024-11-10 PROCEDURE — 80069 RENAL FUNCTION PANEL: CPT | Performed by: NURSE PRACTITIONER

## 2024-11-10 PROCEDURE — 25010000002 DESMOPRESSIN ACETATE PF 4 MCG/ML SOLUTION: Performed by: NURSE PRACTITIONER

## 2024-11-10 PROCEDURE — 25010000002 ENOXAPARIN PER 10 MG: Performed by: NURSE PRACTITIONER

## 2024-11-10 PROCEDURE — 85025 COMPLETE CBC W/AUTO DIFF WBC: CPT | Performed by: NURSE PRACTITIONER

## 2024-11-10 RX ADMIN — SALINE NASAL SPRAY 2 SPRAY: 1.5 SOLUTION NASAL at 16:02

## 2024-11-10 RX ADMIN — SENNOSIDES AND DOCUSATE SODIUM 2 TABLET: 50; 8.6 TABLET ORAL at 08:46

## 2024-11-10 RX ADMIN — SALINE NASAL SPRAY 2 SPRAY: 1.5 SOLUTION NASAL at 12:18

## 2024-11-10 RX ADMIN — HYDRALAZINE HYDROCHLORIDE 50 MG: 50 TABLET ORAL at 07:00

## 2024-11-10 RX ADMIN — DESMOPRESSIN ACETATE 1 MCG: 4 INJECTION, SOLUTION INTRAVENOUS; SUBCUTANEOUS at 01:34

## 2024-11-10 RX ADMIN — SALINE NASAL SPRAY 2 SPRAY: 1.5 SOLUTION NASAL at 10:54

## 2024-11-10 RX ADMIN — SENNOSIDES AND DOCUSATE SODIUM 2 TABLET: 50; 8.6 TABLET ORAL at 21:20

## 2024-11-10 RX ADMIN — METOPROLOL TARTRATE 50 MG: 50 TABLET, FILM COATED ORAL at 08:46

## 2024-11-10 RX ADMIN — SALINE NASAL SPRAY 2 SPRAY: 1.5 SOLUTION NASAL at 08:46

## 2024-11-10 RX ADMIN — SALINE NASAL SPRAY 2 SPRAY: 1.5 SOLUTION NASAL at 06:49

## 2024-11-10 RX ADMIN — DESMOPRESSIN ACETATE 1 MCG: 4 INJECTION, SOLUTION INTRAVENOUS; SUBCUTANEOUS at 10:52

## 2024-11-10 RX ADMIN — SALINE NASAL SPRAY 2 SPRAY: 1.5 SOLUTION NASAL at 13:10

## 2024-11-10 RX ADMIN — AMOXICILLIN AND CLAVULANATE POTASSIUM 1 TABLET: 875; 125 TABLET, COATED ORAL at 07:00

## 2024-11-10 RX ADMIN — AMOXICILLIN AND CLAVULANATE POTASSIUM 1 TABLET: 875; 125 TABLET, COATED ORAL at 23:58

## 2024-11-10 RX ADMIN — HYDRALAZINE HYDROCHLORIDE 50 MG: 50 TABLET ORAL at 13:05

## 2024-11-10 RX ADMIN — HYDRALAZINE HYDROCHLORIDE 50 MG: 50 TABLET ORAL at 21:20

## 2024-11-10 RX ADMIN — ENOXAPARIN SODIUM 60 MG: 100 INJECTION SUBCUTANEOUS at 01:34

## 2024-11-10 RX ADMIN — ENOXAPARIN SODIUM 60 MG: 100 INJECTION SUBCUTANEOUS at 08:46

## 2024-11-10 RX ADMIN — SALINE NASAL SPRAY 2 SPRAY: 1.5 SOLUTION NASAL at 21:28

## 2024-11-10 RX ADMIN — AMLODIPINE BESYLATE 10 MG: 10 TABLET ORAL at 08:46

## 2024-11-10 RX ADMIN — SALINE NASAL SPRAY 2 SPRAY: 1.5 SOLUTION NASAL at 17:21

## 2024-11-10 RX ADMIN — ENOXAPARIN SODIUM 60 MG: 100 INJECTION SUBCUTANEOUS at 21:20

## 2024-11-10 RX ADMIN — METOPROLOL TARTRATE 50 MG: 50 TABLET, FILM COATED ORAL at 21:20

## 2024-11-10 NOTE — PROGRESS NOTES
[facility] NEUROSURGERY INTRACRANIAL POSTOP note    PATIENT IDENTIFICATION:   Name:  Johnny Duran      MRN:  [id number]     34 y.o.  male               CC:POD 5 transsphenoidal resection pituitary adenoma with muscle tensor fascia graft and right leg fat graft          Subjective     Interval History:  moved out of ICU yesterday. No current complaints. UO has dropped but nothing recorded for midnight. BM this morning. Started 2 week course of Augmentin. Scant amount of blood tinged rhinorrhea when using nasal spray. No headache or vision issues. Asking to go home    Objective     Vital signs in last 24 hours:  Temp:  [97.3 °F (36.3 °C)-98.3 °F (36.8 °C)] 97.7 °F (36.5 °C)  Heart Rate:  [91-96] 91  Resp:  [14-18] 18  BP: (116-128)/(65-77) 128/77      Intake/Output this shift:  No intake/output data recorded.    Intake/Output last 3 shifts:  I/O last 3 completed shifts:  In: 1335 [P.O.:1055; I.V.:80; IV Piggyback:200]  Out: 3530 [Urine:3530]      LABS:  Results from last 7 days   Lab Units 11/10/24  0442 11/09/24  0443 11/08/24  0602   WBC 10*3/mm3 11.98* 10.72 11.17*   HEMOGLOBIN g/dL 9.8* 9.3* 9.3*   HEMATOCRIT % 30.2* 28.9* 28.5*   PLATELETS 10*3/mm3 435 386 351      Results from last 7 days   Lab Units 11/10/24  0442 11/09/24  2348 11/09/24  1817 11/09/24  1202 11/09/24  0443 11/08/24  1153 11/08/24  0602   SODIUM mmol/L 139  139 138 137   < > 139   < > 138  138   POTASSIUM mmol/L 4.0  --   --   --  3.9  --  3.7   CHLORIDE mmol/L 102  --   --   --  102  --  99   CO2 mmol/L 27.2  --   --   --  29.3*  --  28.0   BUN mg/dL 17  --   --   --  12  --  11   CREATININE mg/dL 0.53*  --   --   --  0.39*  --  0.40*   GLUCOSE mg/dL 99  --   --   --  102*  --  96   CALCIUM mg/dL 9.0  --   --   --  8.9  --  8.3*    < > = values in this interval not displayed.       Urine specific gravity 1.022-1.026 last 24 hours  Osmo 285-295 lst 24 hours    IMAGING STUDIES:  No new imaging     I personally viewed and interpreted the  patient's chart.    Meds reviewed/changed: Yes    Current Facility-Administered Medications:     acetaminophen (TYLENOL) tablet 650 mg, 650 mg, Oral, Q4H PRN **OR** [DISCONTINUED] acetaminophen (TYLENOL) 160 MG/5ML oral solution 650 mg, 650 mg, Oral, Q4H PRN **OR** [DISCONTINUED] acetaminophen (TYLENOL) suppository 650 mg, 650 mg, Rectal, Q4H PRN, Juan Frank MD    amLODIPine (NORVASC) tablet 10 mg, 10 mg, Oral, Q24H, Justina Mcbride APRN, 10 mg at 11/10/24 0846    amoxicillin-clavulanate (AUGMENTIN) 875-125 MG per tablet 1 tablet, 1 tablet, Oral, Q12H, Justina Mcbride APRN, 1 tablet at 11/10/24 0700    sennosides-docusate (PERICOLACE) 8.6-50 MG per tablet 2 tablet, 2 tablet, Oral, BID, 2 tablet at 11/10/24 0846 **AND** polyethylene glycol (MIRALAX) packet 17 g, 17 g, Oral, Daily PRN **AND** bisacodyl (DULCOLAX) EC tablet 5 mg, 5 mg, Oral, Daily PRN **AND** bisacodyl (DULCOLAX) suppository 10 mg, 10 mg, Rectal, Daily PRN, Justina Mcbride APRN    Calcium Replacement - Follow Nurse / BPA Driven Protocol, , Does not apply, PRN, Justina Mcbride APRN    desmopressin (DDAVP) injection 1 mcg, 1 mcg, Subcutaneous, Q12H, Justina Mcbride APRN, 1 mcg at 11/10/24 1052    Enoxaparin Sodium (LOVENOX) syringe 60 mg, 60 mg, Subcutaneous, BID, Justina Mcbride APRN, 60 mg at 11/10/24 0846    hydrALAZINE (APRESOLINE) tablet 50 mg, 50 mg, Oral, Q8H, Justina Mcbride APRN, 50 mg at 11/10/24 0700    HYDROcodone-acetaminophen (NORCO) 5-325 MG per tablet 1 tablet, 1 tablet, Oral, Q4H PRN, Justina Mcbride APRN    Magnesium Standard Dose Replacement - Follow Nurse / BPA Driven Protocol, , Does not apply, PRN, Justina Mcbride APRN    metoprolol tartrate (LOPRESSOR) tablet 50 mg, 50 mg, Oral, Q12H, Justina Mcbride APRN, 50 mg at 11/10/24 0846    mupirocin (BACTROBAN) 2 % nasal ointment 1 Application, 1 Application, Each Nare, BID, Justina Mcbride APRN, 1 Application at 11/09/24 0915    nitroglycerin  (NITROSTAT) SL tablet 0.4 mg, 0.4 mg, Sublingual, Q5 Min PRN, Justina Mcbride APRN    ondansetron ODT (ZOFRAN-ODT) disintegrating tablet 4 mg, 4 mg, Oral, Q6H PRN **OR** ondansetron (ZOFRAN) injection 4 mg, 4 mg, Intravenous, Q6H PRN, Justina Mcbride APRN    Phosphorus Replacement - Follow Nurse / BPA Driven Protocol, , Does not apply, PRN, Justina Mcbride APRN    Potassium Replacement - Follow Nurse / BPA Driven Protocol, , Does not apply, PRN, Justina Mcbride APRN    sodium chloride nasal spray 2 spray, 2 spray, Each Nare, Q2H While Awake, Justina Mcbride APRN, 2 spray at 11/10/24 1054     Physical Exam:    General:   Awake, alert, oriented x3. Speech clear with no aphasia  HEENT:    No active rhinorrhea.   Neck:    supple  CN II:    Visual fields full to confrontation  CN III IV VI:   Extraocular movements are full , PERRL   CN VII:   Facial movements are symmetric. No weakness.  Motor:    Normal muscle strength, bulk and tone in upper and lower extremities.  No fasciculations, rigidity, spasticity, or abnormal movements.  Sensation:   Normal to light touch; no extinction  Station and Gait:  Per PT note 11/9: Pt is a 33 yo male adm for transphenoidal resection of pituitary adenoma, he is seen in ICU, sittin gup in a chair, anxious to walk, pt was able to stand and walk 200 ft with stand by assist, normal gait pattern and balance, no further PT needs, pt is safe to walk with nursing staff until discharge   Extremities:   Wearing SCD. R thigh incision well appearing without redness drainage or swelling     Assessment & Plan     ASSESSMENT:      Pituitary macroadenoma with extrasellar extension    Patient is POD 5 transsphenoidal resection pituitary adenoma with muscle tensor fascia graft and right leg fat graft.  Transferred out of ICU and doing well without any acute complaints.  Scant amount of bloody rhinorrhea on washcloth but no current drainage.  Tolerating sitting up and mobility well without  "headache.  Urine output seems appropriate however no documentation for midnight. Will defer DI management to nephrology, appreciate their assistance. Slight increase in WBC, afebrile. Will have him mobilize more and use IS.     PLAN:     Mobilize  IS  DI management per nephrology  RACHAEL Scott when okay with nephrology  Continue strict I's/O especially UOP every 4 hour  Return to bed rest if begins with upright headache or rhinorrhea  Keep nasal packing x 3 weeks per ENT  Augmentin BID x2 weeks  Cont nasal spray  SBP < 160  Outpatient open pituitary MRI +/- contrast once discharged      I discussed the patient's findings and my recommendations with patient, family, nursing staff, and Dr. Osborn    During patient visit, I utilized appropriate personal protective equipment including mask and gloves.  Mask used was standard procedure mask. Appropriate PPE was worn during the entire visit.  Hand hygiene was completed before and after.      LOS: 5 days       Nella Campos, APRN  11/10/2024  10:54 EST    \"Dictated utilizing Dragon dictation\".     "

## 2024-11-10 NOTE — PROGRESS NOTES
[facility] INPATIENT PROGRESS NOTE         [address]    11/10/2024      PATIENT IDENTIFICATION:  Name: Johnny Duran ADMIT: 2024   : 1990  PCP: Edouard Vazquez MD    MRN: [id number] LOS: 5 days   AGE/SEX: 34 y.o. male  ROOM: [facility]                     LOS 5    Reason for visit: Postop pituitary macroadenoma resection      SUBJECTIVE:      Resting comfortably.  On room air.      Objective   OBJECTIVE:    Vital Sign Min/Max for last 24 hours  Temp  Min: 97.3 °F (36.3 °C)  Max: 98.3 °F (36.8 °C)   BP  Min: 116/65  Max: 128/77   Pulse  Min: 91  Max: 96   Resp  Min: 14  Max: 18   SpO2  Min: 97 %  Max: 99 %   No data recorded   No data recorded    Vitals:    11/09/24 2011 11/10/24 0041 11/10/24 0506 11/10/24 0705   BP: 116/65 125/72 118/70 128/77   BP Location: Right arm Right arm Right arm Left arm   Patient Position: Lying Lying Lying Lying   Pulse:   91 91   Resp: 18 18 18 18   Temp: 98.3 °F (36.8 °C) 97.3 °F (36.3 °C) 97.9 °F (36.6 °C) 97.7 °F (36.5 °C)   TempSrc: Oral Oral Oral Oral   SpO2:   97% 97%   Weight:       Height:                24  0600 24  2127 24  0417   Weight: (!) 242 kg (534 lb 9.6 oz) (!) 245 kg (540 lb 2 oz) (!) 247 kg (545 lb 9.6 oz)       Body mass index is 71.01 kg/m².                          Body mass index is 71.01 kg/m².    Intake/Output Summary (Last 24 hours) at 11/10/2024 0753  Last data filed at 11/10/2024 0506  Gross per 24 hour   Intake 955 ml   Output 1575 ml   Net -620 ml         Exam:  GEN:  No distress, appears stated age  EYES:   PERRL, anicteric sclerae  ENT:    External ears/nose normal, OP clear  NECK:  No adenopathy, midline trachea  LUNGS: Normal chest on inspection, palpation and auscultation  CV:  Normal S1S2, without murmur  ABD:  Nontender, nondistended, no hepatosplenomegaly, +BS  EXT:  No edema.  No cyanosis or clubbing.  No mottling and normal cap refill.    Assessment     Scheduled meds:  amLODIPine, 10 mg,  Oral, Q24H  amoxicillin-clavulanate, 1 tablet, Oral, Q12H  desmopressin, 1 mcg, Subcutaneous, Q12H  enoxaparin, 60 mg, Subcutaneous, BID  hydrALAZINE, 50 mg, Oral, Q8H  metoprolol tartrate, 50 mg, Oral, Q12H  mupirocin, 1 Application, Each Nare, BID  senna-docusate sodium, 2 tablet, Oral, BID  sodium chloride, 2 spray, Each Nare, Q2H While Awake      IV meds:                           Data Review:  Results from last 7 days   Lab Units 11/10/24  0442 11/09/24  2348 11/09/24  1817 11/09/24  1202 11/09/24  0443 11/08/24  1153 11/08/24  0602 11/07/24  1244 11/07/24  0559 11/06/24  1158 11/06/24  0506   SODIUM mmol/L 139  139 138 137 137 139   < > 138  138   < > 136  136   < > 142  142   POTASSIUM mmol/L 4.0  --   --   --  3.9  --  3.7  --  3.9  --  4.2   CHLORIDE mmol/L 102  --   --   --  102  --  99  --  101  --  106   CO2 mmol/L 27.2  --   --   --  29.3*  --  28.0  --  29.7*  --  26.6   BUN mg/dL 17  --   --   --  12  --  11  --  9  --  6   CREATININE mg/dL 0.53*  --   --   --  0.39*  --  0.40*  --  0.38*  --  0.49*   GLUCOSE mg/dL 99  --   --   --  102*  --  96  --  108*  --  137*   CALCIUM mg/dL 9.0  --   --   --  8.9  --  8.3*  --  8.4*  --  8.4*    < > = values in this interval not displayed.         Estimated Creatinine Clearance: 408.3 mL/min (A) (by C-G formula based on SCr of 0.53 mg/dL (L)).  Results from last 7 days   Lab Units 11/10/24  0442 11/09/24  0443 11/08/24  0602 11/07/24  0559 11/06/24  0505   WBC 10*3/mm3 11.98* 10.72 11.17* 11.60* 15.40*   HEMOGLOBIN g/dL 9.8* 9.3* 9.3* 9.4* 9.6*   PLATELETS 10*3/mm3 435 386 351 350 390         Results from last 7 days   Lab Units 11/08/24  0602 11/07/24  0559 11/06/24  0506 11/05/24  1314   ALT (SGPT) U/L 11 10 12 14   AST (SGOT) U/L 9 12 14 14                 Glucose   Date/Time Value Ref Range Status   11/10/2024 0700 112 70 - 130 mg/dL Final   11/10/2024 0508 102 70 - 130 mg/dL Final   11/10/2024 0046 108 70 - 130 mg/dL Final   11/09/2024 1819 112 70 -  130 mg/dL Final   11/09/2024 0721 138 (H) 70 - 130 mg/dL Final   11/09/2024 0351 101 70 - 130 mg/dL Final   11/08/2024 2341 100 70 - 130 mg/dL Final                 Active Hospital Problems    Diagnosis  POA    **Pituitary macroadenoma with extrasellar extension [D35.2]  Unknown      Resolved Hospital Problems   No resolved problems to display.         ASSESSMENT:  Pituitary macroadenoma status post transsphenoidal resection (11/5)  Diabetes insipidus  Super morbid obesity (BMI 67)  Hyperglycemia  Anemia      PLAN:  Doing well out of intensive care.  Respiratory status is stable.  We will sign off.    Delfino Coles MD  Pulmonary and Critical Care Medicine  [facility], [facility]  11/10/2024    07:53 EST

## 2024-11-10 NOTE — PROGRESS NOTES
[facility] Progress Note      Patient Name: Johnny Duran  : 1990  MRN: [id number]  Primary Care Physician:  Edouard Vazquez MD  Date of admission: 2024    Subjective     Interval History:   Follow-up probable central diabetes insipidus    Patient is doing well, denies chest pain, shortness of breath, orthopnea, or PND.  Urine output decreased to 1.9 L past 24 hours, urine specific gravity stable    Review of Systems:   As noted above    Objective     Vitals:   Temp:  [97.3 °F (36.3 °C)-98.3 °F (36.8 °C)] 97.7 °F (36.5 °C)  Heart Rate:  [89-91] 89  Resp:  [18] 18  BP: (116-128)/(65-77) 119/73  Flow (L/min) (Oxygen Therapy):  [3] 3    Intake/Output Summary (Last 24 hours) at 11/10/2024 1354  Last data filed at 11/10/2024 1000  Gross per 24 hour   Intake 955 ml   Output 1225 ml   Net -270 ml       Physical Exam:    General Appearance: alert, awake, morbidly obese, chronic, no acute distress   Skin: warm and dry  HEENT: oral mucosa normal, nonicteric sclera  Neck: No JVD  Lungs: CTA, unlabored breathing effort  Heart: RRR, no rub  Abdomen: soft, nontender, nondistended  : no palpable bladder, Scott in place  Extremities: no edema, cyanosis or clubbing  Neuro: normal speech and mental status     Scheduled Meds:     amLODIPine, 10 mg, Oral, Q24H  amoxicillin-clavulanate, 1 tablet, Oral, Q12H  desmopressin, 1 mcg, Subcutaneous, Q12H  enoxaparin, 60 mg, Subcutaneous, BID  hydrALAZINE, 50 mg, Oral, Q8H  metoprolol tartrate, 50 mg, Oral, Q12H  mupirocin, 1 Application, Each Nare, BID  senna-docusate sodium, 2 tablet, Oral, BID  sodium chloride, 2 spray, Each Nare, Q2H While Awake      IV Meds:          Results Reviewed:   I have personally reviewed the results from the time of this admission to 11/10/2024 13:54 EST     Results from last 7 days   Lab Units 11/10/24  1145 11/10/24  0442 24  2348 24  1202 24  0443 24  1153 24  0602  11/07/24  1244 11/07/24  0559 11/06/24  1158 11/06/24  0506   SODIUM mmol/L 137 139  139 138   < > 139   < > 138  138   < > 136  136   < > 142  142   POTASSIUM mmol/L  --  4.0  --   --  3.9  --  3.7  --  3.9  --  4.2   CHLORIDE mmol/L  --  102  --   --  102  --  99  --  101  --  106   CO2 mmol/L  --  27.2  --   --  29.3*  --  28.0  --  29.7*  --  26.6   BUN mg/dL  --  17  --   --  12  --  11  --  9  --  6   CREATININE mg/dL  --  0.53*  --   --  0.39*  --  0.40*  --  0.38*  --  0.49*   CALCIUM mg/dL  --  9.0  --   --  8.9  --  8.3*  --  8.4*  --  8.4*   BILIRUBIN mg/dL  --   --   --   --   --   --  0.4  --  0.3  --  <0.2   ALK PHOS U/L  --   --   --   --   --   --  102  --  94  --  92   ALT (SGPT) U/L  --   --   --   --   --   --  11  --  10  --  12   AST (SGOT) U/L  --   --   --   --   --   --  9  --  12  --  14   GLUCOSE mg/dL  --  99  --   --  102*  --  96  --  108*  --  137*    < > = values in this interval not displayed.       Estimated Creatinine Clearance: 408.3 mL/min (A) (by C-G formula based on SCr of 0.53 mg/dL (L)).    Results from last 7 days   Lab Units 11/10/24  0442 11/09/24  0443 11/08/24  0602 11/08/24  0006   MAGNESIUM mg/dL 2.2 2.2 2.1  --    PHOSPHORUS mg/dL 3.5 3.3  --  2.6       Results from last 7 days   Lab Units 11/10/24  0442 11/09/24  0443 11/08/24  0602 11/07/24  0559   URIC ACID mg/dL 3.7 3.6 3.7 3.3*       Results from last 7 days   Lab Units 11/10/24  0442 11/09/24  0443 11/08/24  0602 11/07/24  0559 11/06/24  0505   WBC 10*3/mm3 11.98* 10.72 11.17* 11.60* 15.40*   HEMOGLOBIN g/dL 9.8* 9.3* 9.3* 9.4* 9.6*   PLATELETS 10*3/mm3 435 386 351 350 390             Assessment / Plan     ASSESSMENT:  Diabetes insipidus secondary to pituitary adenoma resection, polyuria improved on descopressin 1mcg BID  Super morbid obese  Hypertension, BP well-controlled  Pituitary adenoma status post transnasal transsphenoidal resection on 11/5/2024.    PLAN:  Discontinue desmopressin today and reassess  in the next 24 hours and decide if he needs to be on desmopressin as an outpatient.  Surveillance labs    I reviewed the chart and other providers notes, reviewed labs.  I discussed the case with the patient and his mother at the bedside   Copied text in this note has been reviewed and is accurate as of 11/10/24.       Thank you for involving us in the care of Johnny Duran.  Please feel free to call with any questions.    Rene Garcia MD  11/10/24  13:54 [facility]    [facility]  [phone]    Please note that portions of this note were completed with a voice recognition program.

## 2024-11-10 NOTE — PLAN OF CARE
Problem: Adult Inpatient Plan of Care  Goal: Plan of Care Review  Outcome: Progressing  Flowsheets (Taken 11/9/2024 1724)  Outcome Evaluation: monitoring close I/Os, pt complains of no pain, nasal saline to keep packing moist, a/0x4, no neurologic changes after transfer from ICU  Goal: Patient-Specific Goal (Individualized)  Outcome: Progressing  Goal: Absence of Hospital-Acquired Illness or Injury  Outcome: Progressing  Intervention: Identify and Manage Fall Risk  Description: Perform standard risk assessment on admission using a validated tool or comprehensive approach appropriate to the patient; reassess fall risk frequently, with change in status or transfer to another level of care.  Communicate risk to interprofessional healthcare team; ensure fall risk visible cue.  Determine need for increased observation, equipment and environmental modification, as well as use of supportive, nonskid footwear.  Adjust safety measures to individual needs and identified risk factors.  Reinforce the importance of active participation with fall risk prevention, safety, and physical activity with the patient and family.  Perform regular intentional rounding to assess need for position change, pain assessment and personal needs, including assistance with toileting.  Recent Flowsheet Documentation  Taken 11/10/2024 1010 by Mirella Flower, RN  Safety Promotion/Fall Prevention:   activity supervised   safety round/check completed  Taken 11/10/2024 0847 by Mirella Flower, IRAIDA  Safety Promotion/Fall Prevention:   activity supervised   safety round/check completed  Intervention: Prevent Infection  Description: Maintain skin and mucous membrane integrity; promote hand, oral and pulmonary hygiene.  Optimize fluid balance, nutrition, sleep and glycemic control to maximize infection resistance.  Identify potential sources of infection early to prevent or mitigate progression of infection (e.g., wound, lines, devices).  Evaluate ongoing need for  invasive devices; remove promptly when no longer indicated.  Review vaccination status.  Recent Flowsheet Documentation  Taken 11/10/2024 1010 by Mirella Flower RN  Infection Prevention:   cohorting utilized   environmental surveillance performed  Taken 11/10/2024 0847 by Mirella Flower RN  Infection Prevention:   cohorting utilized   environmental surveillance performed  Goal: Optimal Comfort and Wellbeing  Outcome: Progressing  Intervention: Provide Person-Centered Care  Description: Use a family-focused approach to care; encourage support system presence and participation.  Develop trust and rapport by proactively providing information, encouraging questions, addressing concerns and offering reassurance.  Acknowledge emotional response to hospitalization.  Recognize and utilize personal coping strategies and strengths; develop goals via shared decision-making.  Honor spiritual and cultural preferences.  Recent Flowsheet Documentation  Taken 11/10/2024 0847 by Mirella Flower RN  Trust Relationship/Rapport:   care explained   emotional support provided   choices provided  Goal: Readiness for Transition of Care  Outcome: Progressing   Goal Outcome Evaluation:              Outcome Evaluation: monitoring close I/Os, pt complains of no pain, nasal saline to keep packing moist, a/0x4, no neurologic changes after transfer from ICU

## 2024-11-10 NOTE — PLAN OF CARE
Problem: Adult Inpatient Plan of Care  Goal: Absence of Hospital-Acquired Illness or Injury  Intervention: Identify and Manage Fall Risk  Recent Flowsheet Documentation  Taken 11/10/2024 0134 by Chance Posada RN  Safety Promotion/Fall Prevention:   activity supervised   assistive device/personal items within reach   clutter free environment maintained   safety round/check completed   nonskid shoes/slippers when out of bed  Taken 11/9/2024 2126 by Chance Posada RN  Safety Promotion/Fall Prevention:   activity supervised   assistive device/personal items within reach   clutter free environment maintained   safety round/check completed   nonskid shoes/slippers when out of bed  Intervention: Prevent Skin Injury  Recent Flowsheet Documentation  Taken 11/10/2024 0134 by Chance Posada RN  Body Position: position changed independently  Taken 11/9/2024 2126 by Chance Posada RN  Body Position: position changed independently   Goal Outcome Evaluation:

## 2024-11-11 DIAGNOSIS — D35.2 PITUITARY MACROADENOMA WITH EXTRASELLAR EXTENSION: Primary | ICD-10-CM

## 2024-11-11 LAB
ALBUMIN SERPL-MCNC: 3.4 G/DL (ref 3.5–5.2)
ANION GAP SERPL CALCULATED.3IONS-SCNC: 8 MMOL/L (ref 5–15)
BASOPHILS # BLD AUTO: 0.05 10*3/MM3 (ref 0–0.2)
BASOPHILS NFR BLD AUTO: 0.4 % (ref 0–1.5)
BUN SERPL-MCNC: 16 MG/DL (ref 6–20)
BUN/CREAT SERPL: 32 (ref 7–25)
CALCIUM SPEC-SCNC: 8.7 MG/DL (ref 8.6–10.5)
CHLORIDE SERPL-SCNC: 103 MMOL/L (ref 98–107)
CO2 SERPL-SCNC: 27 MMOL/L (ref 22–29)
CORTIS SERPL-MCNC: 7.41 MCG/DL
CREAT SERPL-MCNC: 0.5 MG/DL (ref 0.76–1.27)
DEPRECATED RDW RBC AUTO: 42.2 FL (ref 37–54)
EGFRCR SERPLBLD CKD-EPI 2021: 137.3 ML/MIN/1.73
EOSINOPHIL # BLD AUTO: 0.54 10*3/MM3 (ref 0–0.4)
EOSINOPHIL NFR BLD AUTO: 4.6 % (ref 0.3–6.2)
ERYTHROCYTE [DISTWIDTH] IN BLOOD BY AUTOMATED COUNT: 13.6 % (ref 12.3–15.4)
GLUCOSE BLDC GLUCOMTR-MCNC: 102 MG/DL (ref 70–130)
GLUCOSE BLDC GLUCOMTR-MCNC: 104 MG/DL (ref 70–130)
GLUCOSE BLDC GLUCOMTR-MCNC: 104 MG/DL (ref 70–130)
GLUCOSE BLDC GLUCOMTR-MCNC: 105 MG/DL (ref 70–130)
GLUCOSE BLDC GLUCOMTR-MCNC: 109 MG/DL (ref 70–130)
GLUCOSE BLDC GLUCOMTR-MCNC: 99 MG/DL (ref 70–130)
GLUCOSE SERPL-MCNC: 101 MG/DL (ref 65–99)
HCT VFR BLD AUTO: 29.8 % (ref 37.5–51)
HGB BLD-MCNC: 9.5 G/DL (ref 13–17.7)
IMM GRANULOCYTES # BLD AUTO: 0.06 10*3/MM3 (ref 0–0.05)
IMM GRANULOCYTES NFR BLD AUTO: 0.5 % (ref 0–0.5)
LYMPHOCYTES # BLD AUTO: 3.01 10*3/MM3 (ref 0.7–3.1)
LYMPHOCYTES NFR BLD AUTO: 25.8 % (ref 19.6–45.3)
MAGNESIUM SERPL-MCNC: 2.2 MG/DL (ref 1.6–2.6)
MCH RBC QN AUTO: 27.5 PG (ref 26.6–33)
MCHC RBC AUTO-ENTMCNC: 31.9 G/DL (ref 31.5–35.7)
MCV RBC AUTO: 86.4 FL (ref 79–97)
MONOCYTES # BLD AUTO: 0.72 10*3/MM3 (ref 0.1–0.9)
MONOCYTES NFR BLD AUTO: 6.2 % (ref 5–12)
NEUTROPHILS NFR BLD AUTO: 62.5 % (ref 42.7–76)
NEUTROPHILS NFR BLD AUTO: 7.3 10*3/MM3 (ref 1.7–7)
NRBC BLD AUTO-RTO: 0 /100 WBC (ref 0–0.2)
OSMOLALITY SERPL: 286 MOSM/KG (ref 275–300)
OSMOLALITY SERPL: 289 MOSM/KG (ref 275–300)
PHOSPHATE SERPL-MCNC: 3.8 MG/DL (ref 2.5–4.5)
PLATELET # BLD AUTO: 411 10*3/MM3 (ref 140–450)
PMV BLD AUTO: 9.8 FL (ref 6–12)
POTASSIUM SERPL-SCNC: 4.1 MMOL/L (ref 3.5–5.2)
RBC # BLD AUTO: 3.45 10*6/MM3 (ref 4.14–5.8)
SODIUM SERPL-SCNC: 136 MMOL/L (ref 136–145)
SODIUM SERPL-SCNC: 138 MMOL/L (ref 136–145)
SODIUM SERPL-SCNC: 139 MMOL/L (ref 136–145)
SP GR UR STRIP: 1.01 (ref 1–1.03)
SP GR UR STRIP: 1.02 (ref 1–1.03)
URATE SERPL-MCNC: 4.1 MG/DL (ref 3.4–7)
WBC NRBC COR # BLD AUTO: 11.68 10*3/MM3 (ref 3.4–10.8)

## 2024-11-11 PROCEDURE — 25010000002 ENOXAPARIN PER 10 MG: Performed by: NURSE PRACTITIONER

## 2024-11-11 PROCEDURE — 85025 COMPLETE CBC W/AUTO DIFF WBC: CPT | Performed by: NURSE PRACTITIONER

## 2024-11-11 PROCEDURE — 84550 ASSAY OF BLOOD/URIC ACID: CPT | Performed by: INTERNAL MEDICINE

## 2024-11-11 PROCEDURE — 83930 ASSAY OF BLOOD OSMOLALITY: CPT | Performed by: NURSE PRACTITIONER

## 2024-11-11 PROCEDURE — 81003 URINALYSIS AUTO W/O SCOPE: CPT | Performed by: NURSE PRACTITIONER

## 2024-11-11 PROCEDURE — 82948 REAGENT STRIP/BLOOD GLUCOSE: CPT

## 2024-11-11 PROCEDURE — 84295 ASSAY OF SERUM SODIUM: CPT | Performed by: NURSE PRACTITIONER

## 2024-11-11 PROCEDURE — 99024 POSTOP FOLLOW-UP VISIT: CPT

## 2024-11-11 PROCEDURE — 80069 RENAL FUNCTION PANEL: CPT | Performed by: INTERNAL MEDICINE

## 2024-11-11 PROCEDURE — 82533 TOTAL CORTISOL: CPT | Performed by: NURSE PRACTITIONER

## 2024-11-11 PROCEDURE — 83735 ASSAY OF MAGNESIUM: CPT | Performed by: NURSE PRACTITIONER

## 2024-11-11 RX ADMIN — METOPROLOL TARTRATE 50 MG: 50 TABLET, FILM COATED ORAL at 21:38

## 2024-11-11 RX ADMIN — SALINE NASAL SPRAY 2 SPRAY: 1.5 SOLUTION NASAL at 21:40

## 2024-11-11 RX ADMIN — SALINE NASAL SPRAY 2 SPRAY: 1.5 SOLUTION NASAL at 14:13

## 2024-11-11 RX ADMIN — SALINE NASAL SPRAY 2 SPRAY: 1.5 SOLUTION NASAL at 11:31

## 2024-11-11 RX ADMIN — METOPROLOL TARTRATE 50 MG: 50 TABLET, FILM COATED ORAL at 08:38

## 2024-11-11 RX ADMIN — SENNOSIDES AND DOCUSATE SODIUM 2 TABLET: 50; 8.6 TABLET ORAL at 08:38

## 2024-11-11 RX ADMIN — ENOXAPARIN SODIUM 60 MG: 100 INJECTION SUBCUTANEOUS at 08:38

## 2024-11-11 RX ADMIN — HYDRALAZINE HYDROCHLORIDE 50 MG: 50 TABLET ORAL at 21:38

## 2024-11-11 RX ADMIN — SENNOSIDES AND DOCUSATE SODIUM 2 TABLET: 50; 8.6 TABLET ORAL at 21:38

## 2024-11-11 RX ADMIN — SALINE NASAL SPRAY 2 SPRAY: 1.5 SOLUTION NASAL at 00:02

## 2024-11-11 RX ADMIN — SALINE NASAL SPRAY 2 SPRAY: 1.5 SOLUTION NASAL at 06:19

## 2024-11-11 RX ADMIN — AMOXICILLIN AND CLAVULANATE POTASSIUM 1 TABLET: 875; 125 TABLET, COATED ORAL at 08:38

## 2024-11-11 RX ADMIN — SALINE NASAL SPRAY 2 SPRAY: 1.5 SOLUTION NASAL at 08:39

## 2024-11-11 RX ADMIN — SALINE NASAL SPRAY 2 SPRAY: 1.5 SOLUTION NASAL at 23:55

## 2024-11-11 RX ADMIN — SALINE NASAL SPRAY 2 SPRAY: 1.5 SOLUTION NASAL at 10:09

## 2024-11-11 RX ADMIN — HYDRALAZINE HYDROCHLORIDE 50 MG: 50 TABLET ORAL at 14:14

## 2024-11-11 RX ADMIN — AMLODIPINE BESYLATE 10 MG: 10 TABLET ORAL at 08:38

## 2024-11-11 RX ADMIN — AMOXICILLIN AND CLAVULANATE POTASSIUM 1 TABLET: 875; 125 TABLET, COATED ORAL at 21:39

## 2024-11-11 RX ADMIN — SALINE NASAL SPRAY 2 SPRAY: 1.5 SOLUTION NASAL at 15:50

## 2024-11-11 RX ADMIN — HYDRALAZINE HYDROCHLORIDE 50 MG: 50 TABLET ORAL at 06:19

## 2024-11-11 RX ADMIN — ENOXAPARIN SODIUM 60 MG: 100 INJECTION SUBCUTANEOUS at 21:39

## 2024-11-11 RX ADMIN — SALINE NASAL SPRAY 2 SPRAY: 1.5 SOLUTION NASAL at 17:43

## 2024-11-11 NOTE — PROGRESS NOTES
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  Output in the past 24 hours was 1880 cc, desmopressin was discontinued yesterday    Review of Systems:   As noted above    Objective     Vitals:   Temp:  [97.7 °F (36.5 °C)-99.1 °F (37.3 °C)] 98.2 °F (36.8 °C)  Heart Rate:  [] 92  Resp:  [16-18] 16  BP: (119-144)/(68-84) 144/84  Flow (L/min) (Oxygen Therapy):  [2] 2    Intake/Output Summary (Last 24 hours) at 2024 1103  Last data filed at 2024 0955  Gross per 24 hour   Intake 1900 ml   Output 1880 ml   Net 20 ml       Physical Exam:    General Appearance: alert, awake, morbidly obese, chronic, no acute distress   Skin: warm and dry  HEENT: oral mucosa normal, nonicteric sclera  Neck: No JVD  Lungs: CTA, unlabored breathing effort  Heart: RRR, no rub  Abdomen: soft, nontender, nondistended  : no palpable bladder, Scott in place  Extremities: no edema, cyanosis or clubbing  Neuro: normal speech and mental status     Scheduled Meds:     amLODIPine, 10 mg, Oral, Q24H  amoxicillin-clavulanate, 1 tablet, Oral, Q12H  enoxaparin, 60 mg, Subcutaneous, BID  hydrALAZINE, 50 mg, Oral, Q8H  metoprolol tartrate, 50 mg, Oral, Q12H  senna-docusate sodium, 2 tablet, Oral, BID  sodium chloride, 2 spray, Each Nare, Q2H While Awake      IV Meds:          Results Reviewed:   I have personally reviewed the results from the time of this admission to 2024 11:03 EST     Results from last 7 days   Lab Units 24  0430 11/10/24  2354 11/10/24  1802 11/10/24  1145 11/10/24  0442 24  1202 24  0443 24  1153 24  0602 24  1244 24  0559 24  1158 24  0506    SODIUM mmol/L 138  138 139 139   < > 139  139   < > 139   < > 138  138   < > 136  136   < > 142  142   POTASSIUM mmol/L 4.1  --   --   --  4.0  --  3.9  --  3.7  --  3.9  --  4.2   CHLORIDE mmol/L 103  --   --   --  102  --  102  --  99  --  101  --  106   CO2 mmol/L 27.0  --   --   --  27.2  --  29.3*  --  28.0  --  29.7*  --  26.6   BUN mg/dL 16  --   --   --  17  --  12  --  11  --  9  --  6   CREATININE mg/dL 0.50*  --   --   --  0.53*  --  0.39*  --  0.40*  --  0.38*  --  0.49*   CALCIUM mg/dL 8.7  --   --   --  9.0  --  8.9  --  8.3*  --  8.4*  --  8.4*   BILIRUBIN mg/dL  --   --   --   --   --   --   --   --  0.4  --  0.3  --  <0.2   ALK PHOS U/L  --   --   --   --   --   --   --   --  102  --  94  --  92   ALT (SGPT) U/L  --   --   --   --   --   --   --   --  11  --  10  --  12   AST (SGOT) U/L  --   --   --   --   --   --   --   --  9  --  12  --  14   GLUCOSE mg/dL 101*  --   --   --  99  --  102*  --  96  --  108*  --  137*    < > = values in this interval not displayed.       Estimated Creatinine Clearance: 432.8 mL/min (A) (by C-G formula based on SCr of 0.5 mg/dL (L)).    Results from last 7 days   Lab Units 11/11/24  0430 11/10/24  0442 11/09/24  0443   MAGNESIUM mg/dL 2.2 2.2 2.2   PHOSPHORUS mg/dL 3.8 3.5 3.3       Results from last 7 days   Lab Units 11/11/24  0430 11/10/24  0442 11/09/24  0443 11/08/24  0602 11/07/24  0559   URIC ACID mg/dL 4.1 3.7 3.6 3.7 3.3*       Results from last 7 days   Lab Units 11/11/24  0430 11/10/24  0442 11/09/24  0443 11/08/24  0602 11/07/24  0559   WBC 10*3/mm3 11.68* 11.98* 10.72 11.17* 11.60*   HEMOGLOBIN g/dL 9.5* 9.8* 9.3* 9.3* 9.4*   PLATELETS 10*3/mm3 411 435 386 351 350             Assessment / Plan     ASSESSMENT:  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  Sodium is 130  Super morbid obese  Hypertension, BP well-controlled  Pituitary adenoma status post transnasal transsphenoidal resection on 11/5/2024.    PLAN:  Continue the same treatment keep him off desmopressin  Reevaluate in the next 24 hours if remains stable he could be discharged without any additional desmopressin and he will be followed closely as an outpatient.  Surveillance labs    I reviewed the chart and other providers notes, reviewed labs.  I discussed the case with the patient and his mother at the bedside   Discussed the case with TERESITA Almonte  Copied text in this note has been reviewed and is accurate as of 11/11/24.       Thank you for involving us in the care of Johnny Duran.  Please feel free to call with any questions.    Rene Garcia MD  11/11/24  11:03 [facility]    [facility]  [phone]    Please note that portions of this note were completed with a voice recognition program.

## 2024-11-11 NOTE — PLAN OF CARE
Goal Outcome Evaluation:  Plan of Care Reviewed With: patient        Progress: improving     Pt AxO4, calm and cooperative. VSS. On RA. Takes pills whole with water, see MAR. Tolerating diet. No complains of pain, no nausea. Nasal Spray Q2. Nose packed, scant bloody drainage. Sctot catheter taken out at 1200; pt voiding without difficulty. Urine specimen send to lab. Pt's mother at bedside, attentive to pt. Pt is a stand by assist to the bathroom. Urinal at bedside. Plan of care ongoing.      Problem: Adult Inpatient Plan of Care  Goal: Absence of Hospital-Acquired Illness or Injury  Intervention: Identify and Manage Fall Risk  Recent Flowsheet Documentation  Taken 11/11/2024 1809 by Sidra Olivas RN  Safety Promotion/Fall Prevention:   assistive device/personal items within reach   clutter free environment maintained   fall prevention program maintained   nonskid shoes/slippers when out of bed   room organization consistent   safety round/check completed  Taken 11/11/2024 1600 by Sidra Olivas RN  Safety Promotion/Fall Prevention:   assistive device/personal items within reach   clutter free environment maintained   fall prevention program maintained   nonskid shoes/slippers when out of bed   safety round/check completed   room organization consistent  Taken 11/11/2024 1415 by Sidra Olivas, RN  Safety Promotion/Fall Prevention:   assistive device/personal items within reach   clutter free environment maintained   fall prevention program maintained   nonskid shoes/slippers when out of bed   room organization consistent   safety round/check completed  Taken 11/11/2024 1205 by Sidra Olivas, RN  Safety Promotion/Fall Prevention:   clutter free environment maintained   assistive device/personal items within reach   fall prevention program maintained   nonskid shoes/slippers when out of bed   room organization consistent   safety round/check completed  Taken 11/11/2024 1010 by Sidra Olivas, IRAIDA  Safety  Promotion/Fall Prevention:   assistive device/personal items within reach   clutter free environment maintained   fall prevention program maintained   nonskid shoes/slippers when out of bed   room organization consistent   safety round/check completed  Taken 11/11/2024 0835 by Sidra Olivas RN  Safety Promotion/Fall Prevention:   assistive device/personal items within reach   clutter free environment maintained   fall prevention program maintained   nonskid shoes/slippers when out of bed   room organization consistent   safety round/check completed     Problem: Adult Inpatient Plan of Care  Goal: Optimal Comfort and Wellbeing  Intervention: Monitor Pain and Promote Comfort  Recent Flowsheet Documentation  Taken 11/11/2024 0835 by Sidra Olivas, RN  Pain Management Interventions: pain management plan reviewed with patient/caregiver

## 2024-11-11 NOTE — DISCHARGE SUMMARY
Johnny Duran  1990    Patient Care Team:  Edouard Vazquez MD as PCP - General    Date of Admit: 11/5/2024    Date of Discharge:  11/12/2024    Discharge Diagnosis:  Pituitary macroadenoma with extrasellar extension      Procedures Performed  Procedure(s):  TRANSSPHENOIDAL RESECTION OF PITUITARY ADENOMA WITH STEALTH  TRANSSPHENOIDAL RESECTION OF PITUITARY TUMOR WITH NEUROSURGERY, RIGHT DANIEL BULLOSA RESECTION, RIGHT MAXILLARY ANTROSTOMY, POSSIBLE SEPTOPLASTY, POSSIBLE INFERIOR TURBINATE REDUCTION, POSSIBLE NASO-SEPTAL FLAP, AND IMAGE GUIDANCE       Complications: Intraoperative CSF leak with repair    Consultants:   Consults       Date and Time Order Name Status Description    11/5/2024  8:00 PM Inpatient Pulmonology Consult Completed             Condition on Discharge: stable    Discharge disposition: home    Pertinent Test Results:     Brief HPI: Patient evaluated in office for 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  RBAs of treatment were discussed including the above procedure. Patient consented to above procedure.    Hospital Course: Patient admitted for above procedure.. The patient was transferred to ICU following recovery.  Postop day 1 the patient went into diabetes insipidus.  Nephrology was consulted and started desmopressin that night after surgery.  The patient was then placed on 2 mcg subcutaneous dose every 8 hours.  Urine output slowed accordingly.  Eventually desmopressin dosage was decreased down to 1 mcg every 8 hours.  He remained at head of bed 30 degrees strictly with Scott catheter in place until postop day #3.  He remained on a 2-day course of Ancef and then switch to Unasyn for another 2-day course.  After that he switched to Augmentin p.o.  He will be on a 2-week course of Augmentin.  He never exhibited any signs of CSF leak after surgery.  He he was unable to obtain pituitary MRI due to body habitus.  There is  plan for him to undergo outpatient open MRI.  Will evaluate that in the office when he returns.  This MRI has been ordered and should be obtained within this week.   He transferred out of the ICU on 11/9.   Desmopressin was discontinued on 11/10.  Urology saw yesterday morning and stated that they wanted to watch him overnight to observe for stable surveillance labs.  Nephrology is okay with discharge today on 11/12/2024.  Their standpoint the patient will need follow-up with the primary care provider for surveillance labs and periodic check of basic metabolic panel.  I have ordered the patient to get a sodium level check this Friday on 11/15.  He will follow-up with his PCP regarding this lab value.  I also spoke with him regarding the fact that during his hospital stay he was placed on 3 blood pressure medications.  He does not seem to experience any hypotension with the addition of these 3 antihypertensives.  His systolic blood pressure is ranging anywhere from 114-144 over the past day or so.  I urged him that he will need to follow-up with his primary care provider soon as possible for further management of hypertension and management of current medications.  We will prescribe him a months worth of the antihypertensives metoprolol, amlodipine and hydralazine.  He will need to notify his PCP for any further issues with blood pressure.  He continues to be neurologically stable today, denies headaches, rhinorrhea or any other neurologic change.  Discharge instructions and restrictions have been placed in the after visit summary and the patient can refer her to these.  These were discussed at length.  Discharge Physical Exam:    Temp:  [97.5 °F (36.4 °C)-98.3 °F (36.8 °C)] 98.2 °F (36.8 °C)  Heart Rate:  [] 90  Resp:  [16-18] 16  BP: (114-144)/(61-80) 128/70    Current labs:     Latest Reference Range & Units 11/12/24 06:06 11/12/24 06:19   Sodium 136 - 145 mmol/L  136 - 145 mmol/L 138  138    Potassium 3.5 -  5.2 mmol/L 3.9    Chloride 98 - 107 mmol/L 104    CO2 22.0 - 29.0 mmol/L 26.0    Anion Gap 5.0 - 15.0 mmol/L 8.0    BUN 6 - 20 mg/dL 13    Creatinine 0.76 - 1.27 mg/dL 0.45 (L)    BUN/Creatinine Ratio 7.0 - 25.0  28.9 (H)    eGFR >60.0 mL/min/1.73 141.7    Glucose 65 - 99 mg/dL 97    Calcium 8.6 - 10.5 mg/dL 8.6    Magnesium 1.6 - 2.6 mg/dL 2.4    Phosphorus 2.5 - 4.5 mg/dL 3.6    Albumin 3.5 - 5.2 g/dL 3.3 (L)    Uric Acid 3.4 - 7.0 mg/dL 4.1    Cortisol mcg/dL 6.82    Osmolality 275 - 300 mOsm/kg 286    WBC 3.40 - 10.80 10*3/mm3 10.69    RBC 4.14 - 5.80 10*6/mm3 3.42 (L)    Hemoglobin 13.0 - 17.7 g/dL 9.4 (L)    Hematocrit 37.5 - 51.0 % 29.1 (L)    Platelets 140 - 450 10*3/mm3 403    RDW 12.3 - 15.4 % 13.2    MCV 79.0 - 97.0 fL 85.1    MCH 26.6 - 33.0 pg 27.5    MCHC 31.5 - 35.7 g/dL 32.3    MPV 6.0 - 12.0 fL 9.6    RDW-SD 37.0 - 54.0 fl 40.4    Neutrophil Rel % 42.7 - 76.0 % 63.3    Lymphocyte Rel % 19.6 - 45.3 % 24.3    Monocyte Rel % 5.0 - 12.0 % 6.4    Eosinophil Rel % 0.3 - 6.2 % 5.1    Basophil Rel % 0.0 - 1.5 % 0.3    Immature Granulocyte Rel % 0.0 - 0.5 % 0.6 (H)    Neutrophils Absolute 1.70 - 7.00 10*3/mm3 6.78    Lymphocytes Absolute 0.70 - 3.10 10*3/mm3 2.60    Monocytes Absolute 0.10 - 0.90 10*3/mm3 0.68    Eosinophils Absolute 0.00 - 0.40 10*3/mm3 0.54 (H)    Basophils Absolute 0.00 - 0.20 10*3/mm3 0.03    Immature Grans, Absolute 0.00 - 0.05 10*3/mm3 0.06 (H)    nRBC 0.0 - 0.2 /100 WBC 0.0    Specific Gravity, UA 1.005 - 1.030   1.020   (L): Data is abnormally low  (H): Data is abnormally high  Lab Results (last 24 hours)       Procedure Component Value Units Date/Time    Specific Gravity, Urine - Urine, Clean Catch [[id number]]  (Normal) Collected: 11/11/24 1131    Specimen: Urine, Clean Catch Updated: 11/11/24 1220     Specific [place], UA 1.019    POC Glucose Once [[id number]]  (Normal) Collected: 11/11/24 1145    Specimen: Blood Updated: 11/11/24 1147     Glucose 99 mg/dL     Osmolality, Serum  [[id number]]  (Normal) Collected: 11/11/24 1510    Specimen: Blood Updated: 11/11/24 1604     Osmolality 289 mOsm/kg     Sodium [[id number]]  (Normal) Collected: 11/11/24 1510    Specimen: Blood Updated: 11/11/24 1601     Sodium 138 mmol/L     POC Glucose Once [[id number]]  (Normal) Collected: 11/11/24 1613    Specimen: Blood Updated: 11/11/24 1614     Glucose 104 mg/dL     Specific Gravity, Urine - Urine, Clean Catch [[id number]]  (Normal) Collected: 11/11/24 1746    Specimen: Urine, Clean Catch Updated: 11/11/24 1806     Specific [place], UA 1.011    Osmolality, Serum [[id number]]  (Normal) Collected: 11/11/24 1900    Specimen: Blood Updated: 11/11/24 1950     Osmolality 286 mOsm/kg     Sodium [[id number]]  (Normal) Collected: 11/11/24 1900    Specimen: Blood Updated: 11/11/24 1951     Sodium 136 mmol/L     POC Glucose Once [[id number]]  (Normal) Collected: 11/11/24 2018    Specimen: Blood Updated: 11/11/24 2019     Glucose 109 mg/dL     Specific Gravity, Urine - Urine, Clean Catch [[id number]]  (Normal) Collected: 11/11/24 2352    Specimen: Urine, Clean Catch Updated: 11/12/24 0052     Specific Gravity, UA 1.017    Osmolality, Serum [[id number]]  (Normal) Collected: 11/11/24 2355    Specimen: Blood Updated: 11/12/24 0030     Osmolality 287 mOsm/kg     Sodium [[id number]]  (Normal) Collected: 11/11/24 2355    Specimen: Blood Updated: 11/12/24 0046     Sodium 140 mmol/L     Renal Function Panel [[id number]]  (Abnormal) Collected: 11/12/24 0606    Specimen: Blood Updated: 11/12/24 0700     Glucose 97 mg/dL      BUN 13 mg/dL      Creatinine 0.45 mg/dL      Sodium 138 mmol/L      Potassium 3.9 mmol/L      Chloride 104 mmol/L      CO2 26.0 mmol/L      Calcium 8.6 mg/dL      Albumin 3.3 g/dL      Phosphorus 3.6 mg/dL      Anion Gap 8.0 mmol/L      BUN/Creatinine Ratio 28.9     eGFR 141.7 mL/min/1.73     Narrative:      GFR Normal >60  Chronic Kidney Disease <60  Kidney Failure <15      Uric Acid [[id number]]  (Normal) Collected:  11/12/24 0606    Specimen: Blood Updated: 11/12/24 0700     Uric Acid 4.1 mg/dL     Cortisol [[id number]] Collected: 11/12/24 0606    Specimen: Blood Updated: 11/12/24 0704     Cortisol 6.82 mcg/dL     Narrative:      Cortisol Reference Ranges:    Cortisol 6AM - 10AM Range: 6.02-18.40 mcg/dl  Cortisol 4PM - 8PM Range: 2.68-10.50 mcg/dl      Results may be falsely increased if patient taking Biotin.      Osmolality, Serum [[id number]]  (Normal) Collected: 11/12/24 0606    Specimen: Blood Updated: 11/12/24 0643     Osmolality 286 mOsm/kg     Sodium [[id number]]  (Normal) Collected: 11/12/24 0606    Specimen: Blood Updated: 11/12/24 0700     Sodium 138 mmol/L     Magnesium [[id number]]  (Normal) Collected: 11/12/24 0606    Specimen: Blood Updated: 11/12/24 0700     Magnesium 2.4 mg/dL     CBC & Differential [[id number]]  (Abnormal) Collected: 11/12/24 0606    Specimen: Blood Updated: 11/12/24 0637    Narrative:      The following orders were created for panel order CBC & Differential.  Procedure                               Abnormality         Status                     ---------                               -----------         ------                     CBC Auto Differential[[id number]]        Abnormal            Final result                 Please view results for these tests on the individual orders.    CBC Auto Differential [[id number]]  (Abnormal) Collected: 11/12/24 0606    Specimen: Blood Updated: 11/12/24 0637     WBC 10.69 10*3/mm3      RBC 3.42 10*6/mm3      Hemoglobin 9.4 g/dL      Hematocrit 29.1 %      MCV 85.1 fL      MCH 27.5 pg      MCHC 32.3 g/dL      RDW 13.2 %      RDW-SD 40.4 fl      MPV 9.6 fL      Platelets 403 10*3/mm3      Neutrophil % 63.3 %      Lymphocyte % 24.3 %      Monocyte % 6.4 %      Eosinophil % 5.1 %      Basophil % 0.3 %      Immature Grans % 0.6 %      Neutrophils, Absolute 6.78 10*3/mm3      Lymphocytes, Absolute 2.60 10*3/mm3      Monocytes, Absolute 0.68 10*3/mm3      Eosinophils,  Absolute 0.54 10*3/mm3      Basophils, Absolute 0.03 10*3/mm3      Immature Grans, Absolute 0.06 10*3/mm3      nRBC 0.0 /100 WBC     Specific Gravity, Urine - Urine, Clean Catch [[id number]]  (Normal) Collected: 11/12/24 0619    Specimen: Urine, Clean Catch Updated: 11/12/24 0642     Specific Gravity, UA 1.020    POC Glucose Once [[id number]]  (Normal) Collected: 11/12/24 0713    Specimen: Blood Updated: 11/12/24 0714     Glucose 98 mg/dL               General Appearance No acute distress   HEENT NC/AT; nasal packing in place.  No rhinorrhea.   Neurological Awake, Alert, and oriented x 3   Cranial nerves CN II-XII intact   Motor Strength 5/5 throughout, tone normal   Sensory Intact to light touch throughout   Cerebellar No apparent discoordination   Gait and station Ambulating Per PT note 11/9/2024:  Pt is a 33 yo male adm for transphenoidal resection of pituitary adenoma, he is seen in ICU, sittin gup in a chair, anxious to walk, pt was able to stand and walk 200 ft with stand by assist, normal gait pattern and balance, no further PT needs, pt is safe to walk with nursing staff until discharge    Extremities Moves all extremities well, no edema, no cyanosis, no redness  Right lower extremity incision with sutures in place.  No erythema, swelling or drainage.         Discharge Medications  Gregory has been reviewed and narcotic consent is on file in the patient's chart.     Your medication list        START taking these medications        Instructions Last Dose Given Next Dose Due   amLODIPine 10 MG tablet  Commonly known as: NORVASC  Start taking on: November 13, 2024      Take 1 tablet by mouth Daily for 30 days.       amoxicillin-clavulanate 875-125 MG per tablet  Commonly known as: AUGMENTIN      Take 1 tablet by mouth Every 12 (Twelve) Hours for 23 doses. Indications: Empiric       hydrALAZINE 50 MG tablet  Commonly known as: APRESOLINE      Take 1 tablet by mouth Every 8 (Eight) Hours for 30 days.        metoprolol tartrate 50 MG tablet  Commonly known as: LOPRESSOR      Take 1 tablet by mouth Every 12 (Twelve) Hours for 30 days.       sennosides-docusate 8.6-50 MG per tablet  Commonly known as: PERICOLACE      Take 2 tablets by mouth 2 (Two) Times a Day for 10 days.       sodium chloride 0.65 % nasal spray      Administer 2 sprays into the nostril(s) as directed by provider Every 2 (Two) Hours While Awake for 14 days.              CHANGE how you take these medications        Instructions Last Dose Given Next Dose Due   acetaminophen 325 MG tablet  Commonly known as: TYLENOL      Take 2 tablets by mouth Every 4 (Four) Hours As Needed for Mild Pain for up to 7 days.                 Where to Get Your Medications        These medications were sent to [place] [id number]      Hours: Monday to Friday 7 AM to 6 PM, Saturday & Sunday 8 AM to 4:30 PM (Closed 12 PM to 12:30 PM) Phone: [phone]   acetaminophen 325 MG tablet  amLODIPine 10 MG tablet  amoxicillin-clavulanate 875-125 MG per tablet  hydrALAZINE 50 MG tablet  metoprolol tartrate 50 MG tablet  sennosides-docusate 8.6-50 MG per tablet  sodium chloride 0.65 % nasal spray         Discharge Diet:   Diet Instructions       Diet: Regular/House Diet; Regular (IDDSI 7); Thin (IDDSI 0)      Discharge Diet: Regular/House Diet    Texture: Regular (IDDSI 7)    Fluid Consistency: Thin (IDDSI 0)          Diet Order   Procedures    Diet: Regular/House; No Straw; Fluid Consistency: Thin (IDDSI 0)       Activity at Discharge:   Activity Instructions       Discharge Activity      1) No driving until cleared by us and no longer taking narcotics.   2) Off work/school until cleared by us.  3) May shower but no submerging wound in tub, pool, etc.  4) Do not lift / push / pull more then 5 lbs.  5.) No overhead activity/ No bending/twisting/impact activity    Other Restrictions (Specify)      Pelvic Rest              Call for:  questions or concerns    Follow-up Appointments  Future Appointments   Date Time Provider Department Center   11/20/2024  9:15 AM Juan Frank MD [facility]   12/2/2024  9:00 AM Jarred Fregoso DO [facility]      Follow-up Information       Edouadr Vazquez MD .    Specialties: Family Medicine, Urgent Care  Why: 2 to 4 weeks  Contact information:  [facility] [address] [place] [employer]  [phone]                           Additional Instructions for the Follow-ups that You Need to Schedule       Discharge Follow-up with PCP   As directed       Currently Documented PCP:    Edouard Vazquez MD    PCP Phone Number:    [phone]     Follow Up Details: 2 to 4 weeks        Discharge Follow-up with Specialty: Follow-up for sodium level check at neurosurgery office 11/15/2024.  / Follow-up with Dr. Frank, neurosurgery on 11/20/2024.   As directed      Specialty: Follow-up for sodium level check at neurosurgery office 11/15/2024.  / Follow-up with Dr. Frank, neurosurgery on 11/20/2024.        Discharge Follow-up with Specified Provider: Follow-up with Dr. Murillo as scheduled   As directed      To: Follow-up with Dr. Murillo as scheduled        Notify Physician or Go To The ED For the Following Conditions   As directed      Including but not limited to fever >100.5, chills, wound concerns (redness, swelling, drainage), new symptoms of numbness, tingling, weakness; new or uncontrolled pain despite using prescribed medications    Order Comments: Including but not limited to fever >100.5, chills, wound concerns (redness, swelling, drainage), new symptoms of numbness, tingling, weakness; new or uncontrolled pain despite using prescribed medications                 Test Results Pending at Discharge  Pending Labs       Order Current Status    Tissue Pathology Exam Preliminary result           None    I discussed the discharge instructions with patient, family, and Dr. Frank and Dr. Jose Newman  "Gage Acuna, APRN  11/12/24  10:54 EST      \"Dictated utilizing Dragon dictation\".    "

## 2024-11-11 NOTE — PROGRESS NOTES
[facility] NEUROSURGERY INTRACRANIAL POSTOP note    PATIENT IDENTIFICATION:   Name:  Johnny Durna      MRN:  [id number]     34 y.o.  male               CC:POD 6 transsphenoidal resection pituitary adenoma with muscle tensor fascia graft and right leg fat graft          Subjective     Interval History: Out of ICU since 11/9.  moved out of ICU yesterday.  Hemoglobin stable but decreased at 9.5.  WBC count stable, afebrile this a.m.  Sodium stable this morning as well at 138.  Overnight, urine output ranging 350-430 every 4 hours (averaging  cc an hour).  Last dose of desmopressin was at about 11 AM on 11/10.  Objective     Vital signs in last 24 hours:  Temp:  [97.7 °F (36.5 °C)-99.1 °F (37.3 °C)] 98.2 °F (36.8 °C)  Heart Rate:  [] 92  Resp:  [16-18] 16  BP: (119-144)/(68-84) 144/84      Intake/Output this shift:  No intake/output data recorded.    Intake/Output last 3 shifts:  I/O last 3 completed shifts:  In: 2210 [P.O.:2210]  Out: 2480 [Urine:2480]      LABS:  Results from last 7 days   Lab Units 11/11/24  0430 11/10/24  0442 11/09/24  0443   WBC 10*3/mm3 11.68* 11.98* 10.72   HEMOGLOBIN g/dL 9.5* 9.8* 9.3*   HEMATOCRIT % 29.8* 30.2* 28.9*   PLATELETS 10*3/mm3 411 435 386      Results from last 7 days   Lab Units 11/11/24  0430 11/10/24  2354 11/10/24  1802 11/10/24  1145 11/10/24  0442 11/09/24  1202 11/09/24  0443   SODIUM mmol/L 138  138 139 139   < > 139  139   < > 139   POTASSIUM mmol/L 4.1  --   --   --  4.0  --  3.9   CHLORIDE mmol/L 103  --   --   --  102  --  102   CO2 mmol/L 27.0  --   --   --  27.2  --  29.3*   BUN mg/dL 16  --   --   --  17  --  12   CREATININE mg/dL 0.50*  --   --   --  0.53*  --  0.39*   GLUCOSE mg/dL 101*  --   --   --  99  --  102*   CALCIUM mg/dL 8.7  --   --   --  9.0  --  8.9    < > = values in this interval not displayed.          Latest Reference Range & Units 11/10/24 23:54 11/11/24 04:31   Osmolality 275 - 300 mOsm/kg 312 580      Latest Reference Range & Units  11/10/24 23:59 11/11/24 06:20   Specific Gravity, UA 1.005 - 1.030  1.020 1.022     IMAGING STUDIES:  No new imaging     I personally viewed and interpreted the patient's chart.    Meds reviewed/changed: Yes    Current Facility-Administered Medications:     acetaminophen (TYLENOL) tablet 650 mg, 650 mg, Oral, Q4H PRN **OR** [DISCONTINUED] acetaminophen (TYLENOL) 160 MG/5ML oral solution 650 mg, 650 mg, Oral, Q4H PRN **OR** [DISCONTINUED] acetaminophen (TYLENOL) suppository 650 mg, 650 mg, Rectal, Q4H PRN, Juan Frank MD    amLODIPine (NORVASC) tablet 10 mg, 10 mg, Oral, Q24H, Justina Mcbride APRN, 10 mg at 11/10/24 0846    amoxicillin-clavulanate (AUGMENTIN) 875-125 MG per tablet 1 tablet, 1 tablet, Oral, Q12H, Justina Mcbride APRN, 1 tablet at 11/10/24 2358    sennosides-docusate (PERICOLACE) 8.6-50 MG per tablet 2 tablet, 2 tablet, Oral, BID, 2 tablet at 11/10/24 2120 **AND** polyethylene glycol (MIRALAX) packet 17 g, 17 g, Oral, Daily PRN **AND** bisacodyl (DULCOLAX) EC tablet 5 mg, 5 mg, Oral, Daily PRN **AND** bisacodyl (DULCOLAX) suppository 10 mg, 10 mg, Rectal, Daily PRN, Justina Mcbride APRN    Calcium Replacement - Follow Nurse / BPA Driven Protocol, , Does not apply, PRN, Justina Mcbride APRN    Enoxaparin Sodium (LOVENOX) syringe 60 mg, 60 mg, Subcutaneous, BID, Justina Mcbride APRN, 60 mg at 11/10/24 2120    hydrALAZINE (APRESOLINE) tablet 50 mg, 50 mg, Oral, Q8H, Justina Mcbride APRN, 50 mg at 11/11/24 0619    Magnesium Standard Dose Replacement - Follow Nurse / BPA Driven Protocol, , Does not apply, PRN, Justina Mcbride, TERESITA    metoprolol tartrate (LOPRESSOR) tablet 50 mg, 50 mg, Oral, Q12H, Justina Mcbride APRN, 50 mg at 11/10/24 2120    nitroglycerin (NITROSTAT) SL tablet 0.4 mg, 0.4 mg, Sublingual, Q5 Min PRN, Justina Mcbride APRN    ondansetron ODT (ZOFRAN-ODT) disintegrating tablet 4 mg, 4 mg, Oral, Q6H PRN **OR** ondansetron (ZOFRAN) injection 4 mg, 4 mg,  Intravenous, Q6H PRN, Justina Mcbride, APRN    Phosphorus Replacement - Follow Nurse / BPA Driven Protocol, , Does not apply, PRN, Justina Mcbride, APRN    Potassium Replacement - Follow Nurse / BPA Driven Protocol, , Does not apply, PRN, Justina Mcbride, APRN    sodium chloride nasal spray 2 spray, 2 spray, Each Nare, Q2H While Awake, Justina Mcbride APRN, 2 spray at 11/11/24 0619     Physical Exam:    General:   Awake, alert, oriented x3. Speech clear with no aphasia  HEENT:    No rhinorrhea.   Neck:    supple  CN II:    Visual fields full to confrontation  CN III IV VI:   Extraocular movements are full , PERRL 3 mm bilaterally brisk to light  CN VII:   Facial movements are symmetric. No weakness.  Motor:    Normal muscle strength, bulk and tone in upper and lower extremities.  No fasciculations, rigidity, spasticity, or abnormal movements.  Sensation:   Normal to light touch; no extinction  Station and Gait:  Per PT note 11/9: Pt is a 33 yo male adm for transphenoidal resection of pituitary adenoma, he is seen in ICU, sittin gup in a chair, anxious to walk, pt was able to stand and walk 200 ft with stand by assist, normal gait pattern and balance, no further PT needs, pt is safe to walk with nursing staff until discharge   Extremities:   Wearing SCD. R thigh incision well appearing without redness drainage or swelling     Assessment & Plan     ASSESSMENT:      Pituitary macroadenoma with extrasellar extension    Patient is POD 6 transsphenoidal resection pituitary adenoma with muscle tensor fascia graft and right leg fat graft.  Neurologic exam is stable.  He denies headaches at rest and with ambulation and there are no objective signs of CSF leak.  Hourly urine output averaging from 87-107cc overnight.  I spoke with Dr. Garcia, nephrologist, who is fine with removal of urethral catheter.  Nephrology team would like to observe the patient overnight to ensure stability of surveillance labs.  Please  "continue to measure and record urine output.  The patient will likely discharge home tomorrow.  In this case, nephrology recommends that the patient get a sodium level check this upcoming Friday along with a follow-up with his PCP.    The patient has been up to use the restroom but has not done much ambulation.  Please encourage mobilization around the unit at least 3-4 times a day.    In open outpatient MRI of the pituitary with and without contrast has been ordered.  I notified the patient as well as my staff.  The staff is directed the order to [facility] off of [facility].  The patient will obtain this MRI prior to follow-up visit with Dr. Frank on 11/20/2024.    PLAN:     Mobilize  IS  DI management per nephrology  RACHAEL Scott today  Continue to measure/record UOP  Return to bed rest if begins with upright headache or rhinorrhea  Keep nasal packing x 3 weeks per ENT  Augmentin BID x2 weeks  Cont nasal spray  SBP < 160  Outpatient open pituitary MRI +/- ordered  Ordered outpatient sodium lab for Friday, 11/15    I discussed the patient's findings and my recommendations with patient, family, nursing staff, and Dr. Osborn    During patient visit, I utilized appropriate personal protective equipment including mask and gloves.  Mask used was standard procedure mask. Appropriate PPE was worn during the entire visit.  Hand hygiene was completed before and after.      LOS: 6 days       Trent Acuna, APRN  11/11/2024  08:06 EST    \"Dictated utilizing Dragon dictation\".     "

## 2024-11-11 NOTE — PLAN OF CARE
Problem: Adult Inpatient Plan of Care  Goal: Absence of Hospital-Acquired Illness or Injury  Intervention: Prevent and Manage VTE (Venous Thromboembolism) Risk  Recent Flowsheet Documentation  Taken 11/10/2024 2000 by Consuelo Gonzalez RN  VTE Prevention/Management: (Lovenox VTE) --     Problem: Adult Inpatient Plan of Care  Goal: Absence of Hospital-Acquired Illness or Injury  Intervention: Prevent Infection  Recent Flowsheet Documentation  Taken 11/11/2024 0400 by Consuelo Gonzalez RN  Infection Prevention: hand hygiene promoted  Taken 11/11/2024 0200 by Consuelo Gonzalez RN  Infection Prevention: hand hygiene promoted  Taken 11/11/2024 0000 by Consuelo Gonzalez RN  Infection Prevention: hand hygiene promoted  Taken 11/10/2024 2200 by Consuelo Gonzalez RN  Infection Prevention: hand hygiene promoted  Taken 11/10/2024 2000 by Consuelo Gonzalez RN  Infection Prevention: hand hygiene promoted   Goal Outcome Evaluation: Patient AxOx4, resting in bed. Denies pain. Scott in place, strict I&O. Urine sodium q6h. VSS.           Progress: improving

## 2024-11-11 NOTE — DISCHARGE INSTRUCTIONS
[facility]  [address], [address]  [place] [address]  Phone:  [phone]  Fax:  [phone]    Dr. Juan Frank MD            Transnasal transsphenoidal surgery Post-Operative Instructions        Sleep and rest with your head at a 30° angle of elevation.  This helps keep the pressure of your spinal fluid space off of the repair construct in your skull base.  This is most important immediately after surgery.  You can sleep flat after the first week.    Contact your surgeon immediately if you develop a persistent drainage from the incision, this could be a cerebrospinal fluid (CSF)  leak and needs to be managed quickly.    You may resume your usual diet as you tolerate    No lifting overhead, although you may place your arms above your head.  DO NOT lift anything over five (5) pounds.  Walking is allowed and encouraged. There are no restrictions on sitting or climbing stairs, but refrain from overly strenuous activity and take it slow and easy.  You may notice that a constant position (standing or sitting) greater than 45 minutes may tend to make you more sore or tired than normal and therefore it is recommended that you change positions frequently. Do not drive until you are no longer taking narcotic pain medications    Do not use a straw to drink, do not blow your nose, do not sneeze with your mouth closed.  Do not bear down excessively during bowel movements.    Refrain from any sexual activity until after your first evaluation at the office.     You may shower or bathe but do not submerge your head in water.  If you have an incision on your abdomen or leg from graft harvest, shower only and pat dry your incision and and keep your incision dry and clean at all times.    You will leave the hospital with prescriptions for pain medicine and a muscle relaxer.  These medications must be taken as prescribed only.  A hospital policy prevents us from prescribing more than 6 days worth of pain  medications, so please call into the office for refills if needed.  Please allow for 72 hours to refill the pain medication and note that it may not be possible to refill pain medications over the weekend, so please contact our office prior to the weekend. After your first week post surgery, If tolerable, please wean yourself from the narcotic pain medications slowly but it is important that you do not stop taking the narcotics all at once.    A moderate amount of bloody discharge from the nose is expected after surgery for up to 1 week.  Continue to use the saline nasal spray provided to you from the hospital with 2 sprays in each nose every 2 hours while you are awake.     You should have a post-operative visit approximately 2 weeks after surgery.  If you do not have a scheduled appointment, call the office at [phone] to schedule one.  We will discuss return to work at your first post-operative visit, but you can expect to be off of work for an average of 6 weeks.     Notify the office if there are any problems, such as:    Excessive incision pain, headache, nausea or vomiting  Persistent temperature of 101.5° F (38.6 ° C) or greater that is not relieved by Tylenol.  Excessive urination or dehydration  Persistent clear drainage from the nose or mouth, salty taste in mouth  If you have difficulty breathing, have chest pain or shortness of breath, call [phone].   You develop swelling in the calf or leg  Your pain is not controlled with medication  You seem to be getting worse rather than better    Thank you.

## 2024-11-12 ENCOUNTER — READMISSION MANAGEMENT (OUTPATIENT)
Dept: CALL CENTER | Facility: HOSPITAL | Age: 34
End: 2024-11-12
Payer: COMMERCIAL

## 2024-11-12 ENCOUNTER — TELEPHONE (OUTPATIENT)
Dept: NEUROSURGERY | Facility: CLINIC | Age: 34
End: 2024-11-12
Payer: COMMERCIAL

## 2024-11-12 VITALS
HEART RATE: 90 BPM | RESPIRATION RATE: 16 BRPM | BODY MASS INDEX: 40.43 KG/M2 | DIASTOLIC BLOOD PRESSURE: 70 MMHG | OXYGEN SATURATION: 100 % | WEIGHT: 315 LBS | HEIGHT: 74 IN | TEMPERATURE: 98.2 F | SYSTOLIC BLOOD PRESSURE: 128 MMHG

## 2024-11-12 LAB
ALBUMIN SERPL-MCNC: 3.3 G/DL (ref 3.5–5.2)
ANION GAP SERPL CALCULATED.3IONS-SCNC: 8 MMOL/L (ref 5–15)
BASOPHILS # BLD AUTO: 0.03 10*3/MM3 (ref 0–0.2)
BASOPHILS NFR BLD AUTO: 0.3 % (ref 0–1.5)
BUN SERPL-MCNC: 13 MG/DL (ref 6–20)
BUN/CREAT SERPL: 28.9 (ref 7–25)
CALCIUM SPEC-SCNC: 8.6 MG/DL (ref 8.6–10.5)
CHLORIDE SERPL-SCNC: 104 MMOL/L (ref 98–107)
CO2 SERPL-SCNC: 26 MMOL/L (ref 22–29)
CORTIS SERPL-MCNC: 6.82 MCG/DL
CREAT SERPL-MCNC: 0.45 MG/DL (ref 0.76–1.27)
DEPRECATED RDW RBC AUTO: 40.4 FL (ref 37–54)
EGFRCR SERPLBLD CKD-EPI 2021: 141.7 ML/MIN/1.73
EOSINOPHIL # BLD AUTO: 0.54 10*3/MM3 (ref 0–0.4)
EOSINOPHIL NFR BLD AUTO: 5.1 % (ref 0.3–6.2)
ERYTHROCYTE [DISTWIDTH] IN BLOOD BY AUTOMATED COUNT: 13.2 % (ref 12.3–15.4)
GLUCOSE BLDC GLUCOMTR-MCNC: 98 MG/DL (ref 70–130)
GLUCOSE SERPL-MCNC: 97 MG/DL (ref 65–99)
HCT VFR BLD AUTO: 29.1 % (ref 37.5–51)
HGB BLD-MCNC: 9.4 G/DL (ref 13–17.7)
IMM GRANULOCYTES # BLD AUTO: 0.06 10*3/MM3 (ref 0–0.05)
IMM GRANULOCYTES NFR BLD AUTO: 0.6 % (ref 0–0.5)
LYMPHOCYTES # BLD AUTO: 2.6 10*3/MM3 (ref 0.7–3.1)
LYMPHOCYTES NFR BLD AUTO: 24.3 % (ref 19.6–45.3)
MAGNESIUM SERPL-MCNC: 2.4 MG/DL (ref 1.6–2.6)
MCH RBC QN AUTO: 27.5 PG (ref 26.6–33)
MCHC RBC AUTO-ENTMCNC: 32.3 G/DL (ref 31.5–35.7)
MCV RBC AUTO: 85.1 FL (ref 79–97)
MONOCYTES # BLD AUTO: 0.68 10*3/MM3 (ref 0.1–0.9)
MONOCYTES NFR BLD AUTO: 6.4 % (ref 5–12)
NEUTROPHILS NFR BLD AUTO: 6.78 10*3/MM3 (ref 1.7–7)
NEUTROPHILS NFR BLD AUTO: 63.3 % (ref 42.7–76)
NRBC BLD AUTO-RTO: 0 /100 WBC (ref 0–0.2)
OSMOLALITY SERPL: 286 MOSM/KG (ref 275–300)
OSMOLALITY SERPL: 287 MOSM/KG (ref 275–300)
PHOSPHATE SERPL-MCNC: 3.6 MG/DL (ref 2.5–4.5)
PLATELET # BLD AUTO: 403 10*3/MM3 (ref 140–450)
PMV BLD AUTO: 9.6 FL (ref 6–12)
POTASSIUM SERPL-SCNC: 3.9 MMOL/L (ref 3.5–5.2)
RBC # BLD AUTO: 3.42 10*6/MM3 (ref 4.14–5.8)
SODIUM SERPL-SCNC: 138 MMOL/L (ref 136–145)
SODIUM SERPL-SCNC: 138 MMOL/L (ref 136–145)
SODIUM SERPL-SCNC: 140 MMOL/L (ref 136–145)
SP GR UR STRIP: 1.02 (ref 1–1.03)
SP GR UR STRIP: 1.02 (ref 1–1.03)
URATE SERPL-MCNC: 4.1 MG/DL (ref 3.4–7)
WBC NRBC COR # BLD AUTO: 10.69 10*3/MM3 (ref 3.4–10.8)

## 2024-11-12 PROCEDURE — 85025 COMPLETE CBC W/AUTO DIFF WBC: CPT | Performed by: NURSE PRACTITIONER

## 2024-11-12 PROCEDURE — 84550 ASSAY OF BLOOD/URIC ACID: CPT | Performed by: INTERNAL MEDICINE

## 2024-11-12 PROCEDURE — 99024 POSTOP FOLLOW-UP VISIT: CPT

## 2024-11-12 PROCEDURE — 83735 ASSAY OF MAGNESIUM: CPT | Performed by: NURSE PRACTITIONER

## 2024-11-12 PROCEDURE — 84295 ASSAY OF SERUM SODIUM: CPT | Performed by: NURSE PRACTITIONER

## 2024-11-12 PROCEDURE — 82948 REAGENT STRIP/BLOOD GLUCOSE: CPT

## 2024-11-12 PROCEDURE — 25010000002 ENOXAPARIN PER 10 MG: Performed by: NURSE PRACTITIONER

## 2024-11-12 PROCEDURE — 80069 RENAL FUNCTION PANEL: CPT | Performed by: INTERNAL MEDICINE

## 2024-11-12 PROCEDURE — 83930 ASSAY OF BLOOD OSMOLALITY: CPT | Performed by: NURSE PRACTITIONER

## 2024-11-12 PROCEDURE — 82533 TOTAL CORTISOL: CPT | Performed by: NURSE PRACTITIONER

## 2024-11-12 PROCEDURE — 81003 URINALYSIS AUTO W/O SCOPE: CPT | Performed by: NURSE PRACTITIONER

## 2024-11-12 RX ORDER — AMLODIPINE BESYLATE 10 MG/1
10 TABLET ORAL
Qty: 30 TABLET | Refills: 0 | Status: SHIPPED | OUTPATIENT
Start: 2024-11-13 | End: 2024-12-13

## 2024-11-12 RX ORDER — AMOXICILLIN 250 MG
2 CAPSULE ORAL 2 TIMES DAILY
Qty: 40 TABLET | Refills: 0 | Status: SHIPPED | OUTPATIENT
Start: 2024-11-12 | End: 2024-11-22

## 2024-11-12 RX ORDER — METOPROLOL TARTRATE 50 MG
50 TABLET ORAL EVERY 12 HOURS SCHEDULED
Qty: 60 TABLET | Refills: 0 | Status: SHIPPED | OUTPATIENT
Start: 2024-11-12 | End: 2024-12-12

## 2024-11-12 RX ORDER — ACETAMINOPHEN 325 MG/1
650 TABLET ORAL EVERY 4 HOURS PRN
Qty: 28 TABLET | Refills: 0 | Status: SHIPPED | OUTPATIENT
Start: 2024-11-12 | End: 2024-11-19

## 2024-11-12 RX ORDER — ECHINACEA PURPUREA EXTRACT 125 MG
2 TABLET ORAL
Qty: 44 ML | Refills: 7 | Status: SHIPPED | OUTPATIENT
Start: 2024-11-12 | End: 2024-11-26

## 2024-11-12 RX ORDER — HYDRALAZINE HYDROCHLORIDE 50 MG/1
50 TABLET, FILM COATED ORAL EVERY 8 HOURS SCHEDULED
Qty: 90 TABLET | Refills: 0 | Status: SHIPPED | OUTPATIENT
Start: 2024-11-12 | End: 2024-12-12

## 2024-11-12 RX ADMIN — ENOXAPARIN SODIUM 60 MG: 100 INJECTION SUBCUTANEOUS at 08:27

## 2024-11-12 RX ADMIN — SALINE NASAL SPRAY 2 SPRAY: 1.5 SOLUTION NASAL at 09:53

## 2024-11-12 RX ADMIN — AMOXICILLIN AND CLAVULANATE POTASSIUM 1 TABLET: 875; 125 TABLET, COATED ORAL at 08:27

## 2024-11-12 RX ADMIN — METOPROLOL TARTRATE 50 MG: 50 TABLET, FILM COATED ORAL at 08:27

## 2024-11-12 RX ADMIN — SALINE NASAL SPRAY 2 SPRAY: 1.5 SOLUTION NASAL at 06:18

## 2024-11-12 RX ADMIN — SALINE NASAL SPRAY 2 SPRAY: 1.5 SOLUTION NASAL at 08:28

## 2024-11-12 RX ADMIN — AMLODIPINE BESYLATE 10 MG: 10 TABLET ORAL at 08:27

## 2024-11-12 RX ADMIN — SENNOSIDES AND DOCUSATE SODIUM 2 TABLET: 50; 8.6 TABLET ORAL at 08:27

## 2024-11-12 RX ADMIN — HYDRALAZINE HYDROCHLORIDE 50 MG: 50 TABLET ORAL at 06:17

## 2024-11-12 NOTE — TELEPHONE ENCOUNTER
Called [facility] with Nurse Almonte. They are able to accommodate the patient's weight. Weight limit is 550 lbs per their website. Patient is scheduled for 12/12/2024 at 1:30 pm with facility. Faxed over orders to facility. Nurse Almonte spoke to patient's mother via phone.

## 2024-11-12 NOTE — OUTREACH NOTE
Prep Survey      Flowsheet Row Responses   [facility] patient discharged from? [place]   Is LACE score < 7 ? No   Eligibility [facility]   Date of Admission 11/05/24   Date of Discharge 11/12/24   Discharge Disposition Home or Self Care   Discharge diagnosis Pituitary macroadenoma with extrasellar extension   Does the patient have one of the following disease processes/diagnoses(primary or secondary)? General Surgery   Does the patient have Home health ordered? No   Is there a DME ordered? No   Comments regarding appointments Follow-up for sodium level check at neurosurgery office 11/15/2024.  / Follow-up with Dr. Frank, neurosurgery on 11/20/2024.   Medication alerts for this patient see AVS   Prep survey completed? Yes            Desi STONE - Registered Nurse

## 2024-11-12 NOTE — PLAN OF CARE
Goal Outcome Evaluation: Patient alert, sitting up in bed. Denies nasal pain and headache. Nasal packing in place, still using nasal spray q2h while awake. Patient reports scant bloody drainage when using nasal spray, denies clear drainage. Voiding without any issues, 850ml UOP tonight so far. Urine sodium q6h. VSS. Discussed plan to likely dc home tomorrow with patient and his mother at the bedside.   Plan of Care Reviewed With: patient, parent        Progress: improving       Problem: Adult Inpatient Plan of Care  Goal: Absence of Hospital-Acquired Illness or Injury  Intervention: Prevent Skin Injury  Recent Flowsheet Documentation  Taken 11/12/2024 0200 by Consuelo Gonzalez RN  Body Position: position changed independently  Taken 11/12/2024 0000 by Consuelo Gonzalez RN  Body Position: position changed independently  Taken 11/11/2024 2200 by Consuelo Gonzalez RN  Body Position: position changed independently  Taken 11/11/2024 2000 by Consuelo Gonzalez RN  Body Position: position changed independently  Skin Protection: transparent dressing maintained     Problem: Adult Inpatient Plan of Care  Goal: Absence of Hospital-Acquired Illness or Injury  Intervention: Identify and Manage Fall Risk  Recent Flowsheet Documentation  Taken 11/12/2024 0200 by Consuelo Gonzalez RN  Safety Promotion/Fall Prevention:   activity supervised   assistive device/personal items within reach   clutter free environment maintained   safety round/check completed   nonskid shoes/slippers when out of bed  Taken 11/12/2024 0000 by Consuelo Gonzalez RN  Safety Promotion/Fall Prevention:   activity supervised   assistive device/personal items within reach   clutter free environment maintained   safety round/check completed   nonskid shoes/slippers when out of bed  Taken 11/11/2024 2200 by Consuelo Gonzalez RN  Safety Promotion/Fall Prevention:   activity supervised   assistive device/personal items within reach   clutter free environment maintained    safety round/check completed   nonskid shoes/slippers when out of bed  Taken 11/11/2024 2000 by Consuelo Gonzalez RN  Safety Promotion/Fall Prevention:   activity supervised   assistive device/personal items within reach   clutter free environment maintained   safety round/check completed   nonskid shoes/slippers when out of bed     Problem: Adult Inpatient Plan of Care  Goal: Absence of Hospital-Acquired Illness or Injury  Intervention: Prevent and Manage VTE (Venous Thromboembolism) Risk  Recent Flowsheet Documentation  Taken 11/11/2024 2000 by Consuelo Gonzalez RN  VTE Prevention/Management: (Lovenox VTE) --     Problem: Adult Inpatient Plan of Care  Goal: Optimal Comfort and Wellbeing  Intervention: Monitor Pain and Promote Comfort  Recent Flowsheet Documentation  Taken 11/11/2024 2000 by Consuelo Gonzalez RN  Pain Management Interventions:   care clustered   pain management plan reviewed with patient/caregiver   position adjusted

## 2024-11-12 NOTE — PROGRESS NOTES
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  Urine output is good, voiding well after Scott catheter was removed the sodium remains stable he is currently off desmopressin.    Review of Systems:   As noted above    Objective     Vitals:   Temp:  [97.5 °F (36.4 °C)-98.3 °F (36.8 °C)] 98.2 °F (36.8 °C)  Heart Rate:  [] 90  Resp:  [16-18] 16  BP: (114-144)/(61-80) 128/70    Intake/Output Summary (Last 24 hours) at 2024 1029  Last data filed at 2024 0400  Gross per 24 hour   Intake 1740 ml   Output 2600 ml   Net -860 ml       Physical Exam:    General Appearance: alert, awake, morbidly obese, chronic, no acute distress   Skin: warm and dry  HEENT: oral mucosa normal, nonicteric sclera  Neck: No JVD  Lungs: CTA, unlabored breathing effort  Heart: RRR, no rub  Abdomen: soft, nontender, nondistended  : no palpable bladder.  Extremities: no edema, cyanosis or clubbing  Neuro: normal speech and mental status     Scheduled Meds:     amLODIPine, 10 mg, Oral, Q24H  amoxicillin-clavulanate, 1 tablet, Oral, Q12H  enoxaparin, 60 mg, Subcutaneous, BID  hydrALAZINE, 50 mg, Oral, Q8H  metoprolol tartrate, 50 mg, Oral, Q12H  senna-docusate sodium, 2 tablet, Oral, BID  sodium chloride, 2 spray, Each Nare, Q2H While Awake      IV Meds:          Results Reviewed:   I have personally reviewed the results from the time of this admission to 2024 10:29 EST     Results from last 7 days   Lab Units 24  0606 24  2355 24  1900 24  1510 24  0430 11/10/24  1145 11/10/24  0442 24  1153 24  0602 24  1244 24  0559 24  1158 24  0506    SODIUM mmol/L 138  138 140 136   < > 138  138   < > 139  139   < > 138  138   < > 136  136   < > 142  142   POTASSIUM mmol/L 3.9  --   --   --  4.1  --  4.0   < > 3.7  --  3.9  --  4.2   CHLORIDE mmol/L 104  --   --   --  103  --  102   < > 99  --  101  --  106   CO2 mmol/L 26.0  --   --   --  27.0  --  27.2   < > 28.0  --  29.7*  --  26.6   BUN mg/dL 13  --   --   --  16  --  17   < > 11  --  9  --  6   CREATININE mg/dL 0.45*  --   --   --  0.50*  --  0.53*   < > 0.40*  --  0.38*  --  0.49*   CALCIUM mg/dL 8.6  --   --   --  8.7  --  9.0   < > 8.3*  --  8.4*  --  8.4*   BILIRUBIN mg/dL  --   --   --   --   --   --   --   --  0.4  --  0.3  --  <0.2   ALK PHOS U/L  --   --   --   --   --   --   --   --  102  --  94  --  92   ALT (SGPT) U/L  --   --   --   --   --   --   --   --  11  --  10  --  12   AST (SGOT) U/L  --   --   --   --   --   --   --   --  9  --  12  --  14   GLUCOSE mg/dL 97  --   --   --  101*  --  99   < > 96  --  108*  --  137*    < > = values in this interval not displayed.       Estimated Creatinine Clearance: 480.9 mL/min (A) (by C-G formula based on SCr of 0.45 mg/dL (L)).    Results from last 7 days   Lab Units 11/12/24  0606 11/11/24  0430 11/10/24  0442   MAGNESIUM mg/dL 2.4 2.2 2.2   PHOSPHORUS mg/dL 3.6 3.8 3.5       Results from last 7 days   Lab Units 11/12/24  0606 11/11/24  0430 11/10/24  0442 11/09/24  0443 11/08/24  0602 11/07/24  0559   URIC ACID mg/dL 4.1 4.1 3.7 3.6 3.7 3.3*       Results from last 7 days   Lab Units 11/12/24  0606 11/11/24  0430 11/10/24  0442 11/09/24  0443 11/08/24  0602   WBC 10*3/mm3 10.69 11.68* 11.98* 10.72 11.17*   HEMOGLOBIN g/dL 9.4* 9.5* 9.8* 9.3* 9.3*   PLATELETS 10*3/mm3 403 [phone] 351             Assessment / Plan     ASSESSMENT:  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  Sodium is 138  Super morbid obese  Hypertension, BP well-controlled  Pituitary adenoma status post transnasal transsphenoidal resection on 11/5/2024.    PLAN:  The patient could be discharged from the renal standpoint  Need follow-up.  His PCP to monitor periodically is BMP    I reviewed the chart and other providers notes, reviewed labs.  I discussed the case with the patient and his mother at the bedside   Discussed the case with TERESITA Almonte  Copied text in this note has been reviewed and is accurate as of 11/12/24.       Thank you for involving us in the care of Johnny Duran.  Please feel free to call with any questions.    Rene Garcia MD  11/12/24  10:29 [facility]    [facility]  [phone]    Please note that portions of this note were completed with a voice recognition program.

## 2024-11-13 ENCOUNTER — TRANSITIONAL CARE MANAGEMENT TELEPHONE ENCOUNTER (OUTPATIENT)
Dept: CALL CENTER | Facility: HOSPITAL | Age: 34
End: 2024-11-13
Payer: COMMERCIAL

## 2024-11-13 NOTE — OUTREACH NOTE
Call Center TCM Note      Flowsheet Row Responses   [facility] patient discharged from? [place]   Does the patient have one of the following disease processes/diagnoses(primary or secondary)? General Surgery   TCM attempt successful? No   Unsuccessful attempts Attempt 2            Capri Roman MA    11/13/2024, 14:54 EST

## 2024-11-13 NOTE — OUTREACH NOTE
Call Center TCM Note      Flowsheet Row Responses   [facility] patient discharged from? [place]   Does the patient have one of the following disease processes/diagnoses(primary or secondary)? General Surgery   TCM attempt successful? No   Unsuccessful attempts Attempt 1            Capri Roman MA    11/13/2024, 11:30 EST

## 2024-11-13 NOTE — PAYOR COMM NOTE
"Renee Johnny AJTIN (34 y.o. Male)          DC SUMMARY FOR [id number]     AWAITING FAX FOR TOTAL # OF APPROVED DAYS    CONTACT PRERNA BEDOYA# [phone]         Date of Birth   1990    Social Security Number       Address   [address] [place] [address]    Home Phone   [phone]    MRN   [id number]       Scientology   None    Marital Status   Single                            Admission Date   11/5/24    Admission Type   Elective    Admitting Provider   Juan Frank MD    Attending Provider       Department, Room/Bed   [facility] [place], P599/1       Discharge Date   11/12/2024    Discharge Disposition   Home or Self Care    Discharge Destination                                 Attending Provider: (none)   Allergies: Codeine, Bee Venom    Isolation: None   Infection: None   Code Status: Prior    Ht: 186.7 cm (73.5\")   Wt: 247 kg (545 lb 9.6 oz)    Admission Cmt: None   Principal Problem: Pituitary macroadenoma with extrasellar extension [D35.2]                   Active Insurance as of 11/5/2024       Primary Coverage       Payor Plan Insurance Group Employer/Plan Group    [id number]       Payor Plan Address Payor Plan Phone Number Payor Plan Fax Number Effective Dates    [address] [address] [phone]  1/1/2013 - None Entered    [facility] [address]         Subscriber Name Subscriber Birth Date Member ID       JOHNNY BLUM 1990 [id number]                     Emergency Contacts        (Rel.) Home Phone Work Phone Mobile Phone    moises coelho (Mother) -- -- [phone]    SANTOSH BLUM (Relative) -- -- [phone]                 Discharge Summary        Trent Acuna APRN at 11/12/24 0808       Attestation signed by Juan Frank MD at 11/12/24 1106    I have reviewed this documentation and agree.                  Johnny Blum  1990    Patient Care Team:  Edouard Vazquez MD as PCP - General    Date of Admit: 11/5/2024    Date of " Discharge:  11/12/2024    Discharge Diagnosis:  Pituitary macroadenoma with extrasellar extension      Procedures Performed  Procedure(s):  TRANSSPHENOIDAL RESECTION OF PITUITARY ADENOMA WITH STEALTH  TRANSSPHENOIDAL RESECTION OF PITUITARY TUMOR WITH NEUROSURGERY, RIGHT DANIEL BULLOSA RESECTION, RIGHT MAXILLARY ANTROSTOMY, POSSIBLE SEPTOPLASTY, POSSIBLE INFERIOR TURBINATE REDUCTION, POSSIBLE NASO-SEPTAL FLAP, AND IMAGE GUIDANCE       Complications: Intraoperative CSF leak with repair    Consultants:   Consults       Date and Time Order Name Status Description    11/5/2024  8:00 PM Inpatient Pulmonology Consult Completed             Condition on Discharge: stable    Discharge disposition: home    Pertinent Test Results:     Brief HPI: Patient evaluated in office for 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  RBAs of treatment were discussed including the above procedure. Patient consented to above procedure.    Hospital Course: Patient admitted for above procedure.. The patient was transferred to ICU following recovery.  Postop day 1 the patient went into diabetes insipidus.  Nephrology was consulted and started desmopressin that night after surgery.  The patient was then placed on 2 mcg subcutaneous dose every 8 hours.  Urine output slowed accordingly.  Eventually desmopressin dosage was decreased down to 1 mcg every 8 hours.  He remained at head of bed 30 degrees strictly with Scott catheter in place until postop day #3.  He remained on a 2-day course of Ancef and then switch to Unasyn for another 2-day course.  After that he switched to Augmentin p.o.  He will be on a 2-week course of Augmentin.  He never exhibited any signs of CSF leak after surgery.  He he was unable to obtain pituitary MRI due to body habitus.  There is plan for him to undergo outpatient open MRI.  Will evaluate that in the office when he returns.  This MRI has been ordered and should  be obtained within this week.   He transferred out of the ICU on 11/9.   Desmopressin was discontinued on 11/10.  Urology saw yesterday morning and stated that they wanted to watch him overnight to observe for stable surveillance labs.  Nephrology is okay with discharge today on 11/12/2024.  Their standpoint the patient will need follow-up with the primary care provider for surveillance labs and periodic check of basic metabolic panel.  I have ordered the patient to get a sodium level check this Friday on 11/15.  He will follow-up with his PCP regarding this lab value.  I also spoke with him regarding the fact that during his hospital stay he was placed on 3 blood pressure medications.  He does not seem to experience any hypotension with the addition of these 3 antihypertensives.  His systolic blood pressure is ranging anywhere from 114-144 over the past day or so.  I urged him that he will need to follow-up with his primary care provider soon as possible for further management of hypertension and management of current medications.  We will prescribe him a months worth of the antihypertensives metoprolol, amlodipine and hydralazine.  He will need to notify his PCP for any further issues with blood pressure.  He continues to be neurologically stable today, denies headaches, rhinorrhea or any other neurologic change.  Discharge instructions and restrictions have been placed in the after visit summary and the patient can refer her to these.  These were discussed at length.  Discharge Physical Exam:    Temp:  [97.5 °F (36.4 °C)-98.3 °F (36.8 °C)] 98.2 °F (36.8 °C)  Heart Rate:  [] 90  Resp:  [16-18] 16  BP: (114-144)/(61-80) 128/70    Current labs:     Latest Reference Range & Units 11/12/24 06:06 11/12/24 06:19   Sodium 136 - 145 mmol/L  136 - 145 mmol/L 138  138    Potassium 3.5 - 5.2 mmol/L 3.9    Chloride 98 - 107 mmol/L 104    CO2 22.0 - 29.0 mmol/L 26.0    Anion Gap 5.0 - 15.0 mmol/L 8.0    BUN 6 - 20 mg/dL  13    Creatinine 0.76 - 1.27 mg/dL 0.45 (L)    BUN/Creatinine Ratio 7.0 - 25.0  28.9 (H)    eGFR >60.0 mL/min/1.73 141.7    Glucose 65 - 99 mg/dL 97    Calcium 8.6 - 10.5 mg/dL 8.6    Magnesium 1.6 - 2.6 mg/dL 2.4    Phosphorus 2.5 - 4.5 mg/dL 3.6    Albumin 3.5 - 5.2 g/dL 3.3 (L)    Uric Acid 3.4 - 7.0 mg/dL 4.1    Cortisol mcg/dL 6.82    Osmolality 275 - 300 mOsm/kg 286    WBC 3.40 - 10.80 10*3/mm3 10.69    RBC 4.14 - 5.80 10*6/mm3 3.42 (L)    Hemoglobin 13.0 - 17.7 g/dL 9.4 (L)    Hematocrit 37.5 - 51.0 % 29.1 (L)    Platelets 140 - 450 10*3/mm3 403    RDW 12.3 - 15.4 % 13.2    MCV 79.0 - 97.0 fL 85.1    MCH 26.6 - 33.0 pg 27.5    MCHC 31.5 - 35.7 g/dL 32.3    MPV 6.0 - 12.0 fL 9.6    RDW-SD 37.0 - 54.0 fl 40.4    Neutrophil Rel % 42.7 - 76.0 % 63.3    Lymphocyte Rel % 19.6 - 45.3 % 24.3    Monocyte Rel % 5.0 - 12.0 % 6.4    Eosinophil Rel % 0.3 - 6.2 % 5.1    Basophil Rel % 0.0 - 1.5 % 0.3    Immature Granulocyte Rel % 0.0 - 0.5 % 0.6 (H)    Neutrophils Absolute 1.70 - 7.00 10*3/mm3 6.78    Lymphocytes Absolute 0.70 - 3.10 10*3/mm3 2.60    Monocytes Absolute 0.10 - 0.90 10*3/mm3 0.68    Eosinophils Absolute 0.00 - 0.40 10*3/mm3 0.54 (H)    Basophils Absolute 0.00 - 0.20 10*3/mm3 0.03    Immature Grans, Absolute 0.00 - 0.05 10*3/mm3 0.06 (H)    nRBC 0.0 - 0.2 /100 WBC 0.0    Specific Gravity, UA 1.005 - 1.030   1.020   (L): Data is abnormally low  (H): Data is abnormally high  Lab Results (last 24 hours)       Procedure Component Value Units Date/Time    Specific Gravity, Urine - Urine, Clean Catch [[id number]]  (Normal) Collected: 11/11/24 1131    Specimen: Urine, Clean Catch Updated: 11/11/24 1220     Specific Lennon, UA 1.019    POC Glucose Once [[id number]]  (Normal) Collected: 11/11/24 1145    Specimen: Blood Updated: 11/11/24 1147     Glucose 99 mg/dL     Osmolality, Serum [[id number]]  (Normal) Collected: 11/11/24 1510    Specimen: Blood Updated: 11/11/24 1604     Osmolality 289 mOsm/kg     Sodium  [[id number]]  (Normal) Collected: 11/11/24 1510    Specimen: Blood Updated: 11/11/24 1601     Sodium 138 mmol/L     POC Glucose Once [[id number]]  (Normal) Collected: 11/11/24 1613    Specimen: Blood Updated: 11/11/24 1614     Glucose 104 mg/dL     Specific Gravity, Urine - Urine, Clean Catch [[id number]]  (Normal) Collected: 11/11/24 1746    Specimen: Urine, Clean Catch Updated: 11/11/24 1806     Specific Moffit, UA 1.011    Osmolality, Serum [[id number]]  (Normal) Collected: 11/11/24 1900    Specimen: Blood Updated: 11/11/24 1950     Osmolality 286 mOsm/kg     Sodium [[id number]]  (Normal) Collected: 11/11/24 1900    Specimen: Blood Updated: 11/11/24 1951     Sodium 136 mmol/L     POC Glucose Once [[id number]]  (Normal) Collected: 11/11/24 2018    Specimen: Blood Updated: 11/11/24 2019     Glucose 109 mg/dL     Specific Gravity, Urine - Urine, Clean Catch [[id number]]  (Normal) Collected: 11/11/24 2352    Specimen: Urine, Clean Catch Updated: 11/12/24 0052     Specific Gravity, UA 1.017    Osmolality, Serum [[id number]]  (Normal) Collected: 11/11/24 2355    Specimen: Blood Updated: 11/12/24 0030     Osmolality 287 mOsm/kg     Sodium [[id number]]  (Normal) Collected: 11/11/24 2355    Specimen: Blood Updated: 11/12/24 0046     Sodium 140 mmol/L     Renal Function Panel [[id number]]  (Abnormal) Collected: 11/12/24 0606    Specimen: Blood Updated: 11/12/24 0700     Glucose 97 mg/dL      BUN 13 mg/dL      Creatinine 0.45 mg/dL      Sodium 138 mmol/L      Potassium 3.9 mmol/L      Chloride 104 mmol/L      CO2 26.0 mmol/L      Calcium 8.6 mg/dL      Albumin 3.3 g/dL      Phosphorus 3.6 mg/dL      Anion Gap 8.0 mmol/L      BUN/Creatinine Ratio 28.9     eGFR 141.7 mL/min/1.73     Narrative:      GFR Normal >60  Chronic Kidney Disease <60  Kidney Failure <15      Uric Acid [[id number]]  (Normal) Collected: 11/12/24 0606    Specimen: Blood Updated: 11/12/24 0700     Uric Acid 4.1 mg/dL     Cortisol [[id number]] Collected: 11/12/24 0606     Specimen: Blood Updated: 11/12/24 0704     Cortisol 6.82 mcg/dL     Narrative:      Cortisol Reference Ranges:    Cortisol 6AM - 10AM Range: 6.02-18.40 mcg/dl  Cortisol 4PM - 8PM Range: 2.68-10.50 mcg/dl      Results may be falsely increased if patient taking Biotin.      Osmolality, Serum [[id number]]  (Normal) Collected: 11/12/24 0606    Specimen: Blood Updated: 11/12/24 0643     Osmolality 286 mOsm/kg     Sodium [[id number]]  (Normal) Collected: 11/12/24 0606    Specimen: Blood Updated: 11/12/24 0700     Sodium 138 mmol/L     Magnesium [[id number]]  (Normal) Collected: 11/12/24 0606    Specimen: Blood Updated: 11/12/24 0700     Magnesium 2.4 mg/dL     CBC & Differential [[id number]]  (Abnormal) Collected: 11/12/24 0606    Specimen: Blood Updated: 11/12/24 0637    Narrative:      The following orders were created for panel order CBC & Differential.  Procedure                               Abnormality         Status                     ---------                               -----------         ------                     CBC Auto Differential[[id number]]        Abnormal            Final result                 Please view results for these tests on the individual orders.    CBC Auto Differential [[id number]]  (Abnormal) Collected: 11/12/24 0606    Specimen: Blood Updated: 11/12/24 0637     WBC 10.69 10*3/mm3      RBC 3.42 10*6/mm3      Hemoglobin 9.4 g/dL      Hematocrit 29.1 %      MCV 85.1 fL      MCH 27.5 pg      MCHC 32.3 g/dL      RDW 13.2 %      RDW-SD 40.4 fl      MPV 9.6 fL      Platelets 403 10*3/mm3      Neutrophil % 63.3 %      Lymphocyte % 24.3 %      Monocyte % 6.4 %      Eosinophil % 5.1 %      Basophil % 0.3 %      Immature Grans % 0.6 %      Neutrophils, Absolute 6.78 10*3/mm3      Lymphocytes, Absolute 2.60 10*3/mm3      Monocytes, Absolute 0.68 10*3/mm3      Eosinophils, Absolute 0.54 10*3/mm3      Basophils, Absolute 0.03 10*3/mm3      Immature Grans, Absolute 0.06 10*3/mm3      nRBC 0.0 /100 WBC      Specific Gravity, Urine - Urine, Clean Catch [[id number]]  (Normal) Collected: 11/12/24 0619    Specimen: Urine, Clean Catch Updated: 11/12/24 0642     Specific Gravity, UA 1.020    POC Glucose Once [[id number]]  (Normal) Collected: 11/12/24 0713    Specimen: Blood Updated: 11/12/24 0714     Glucose 98 mg/dL               General Appearance No acute distress   HEENT NC/AT; nasal packing in place.  No rhinorrhea.   Neurological Awake, Alert, and oriented x 3   Cranial nerves CN II-XII intact   Motor Strength 5/5 throughout, tone normal   Sensory Intact to light touch throughout   Cerebellar No apparent discoordination   Gait and station Ambulating Per PT note 11/9/2024:  Pt is a 33 yo male adm for transphenoidal resection of pituitary adenoma, he is seen in ICU, sittin gup in a chair, anxious to walk, pt was able to stand and walk 200 ft with stand by assist, normal gait pattern and balance, no further PT needs, pt is safe to walk with nursing staff until discharge    Extremities Moves all extremities well, no edema, no cyanosis, no redness  Right lower extremity incision with sutures in place.  No erythema, swelling or drainage.         Discharge Medications  Gregory has been reviewed and narcotic consent is on file in the patient's chart.     Your medication list        START taking these medications        Instructions Last Dose Given Next Dose Due   amLODIPine 10 MG tablet  Commonly known as: NORVASC  Start taking on: November 13, 2024      Take 1 tablet by mouth Daily for 30 days.       amoxicillin-clavulanate 875-125 MG per tablet  Commonly known as: AUGMENTIN      Take 1 tablet by mouth Every 12 (Twelve) Hours for 23 doses. Indications: Empiric       hydrALAZINE 50 MG tablet  Commonly known as: APRESOLINE      Take 1 tablet by mouth Every 8 (Eight) Hours for 30 days.       metoprolol tartrate 50 MG tablet  Commonly known as: LOPRESSOR      Take 1 tablet by mouth Every 12 (Twelve) Hours for 30 days.        sennosides-docusate 8.6-50 MG per tablet  Commonly known as: PERICOLACE      Take 2 tablets by mouth 2 (Two) Times a Day for 10 days.       sodium chloride 0.65 % nasal spray      Administer 2 sprays into the nostril(s) as directed by provider Every 2 (Two) Hours While Awake for 14 days.              CHANGE how you take these medications        Instructions Last Dose Given Next Dose Due   acetaminophen 325 MG tablet  Commonly known as: TYLENOL      Take 2 tablets by mouth Every 4 (Four) Hours As Needed for Mild Pain for up to 7 days.                 Where to Get Your Medications        These medications were sent to [place] [address]      Hours: Monday to Friday 7 AM to 6 PM, Saturday & Sunday 8 AM to 4:30 PM (Closed 12 PM to 12:30 PM) Phone: [phone]   acetaminophen 325 MG tablet  amLODIPine 10 MG tablet  amoxicillin-clavulanate 875-125 MG per tablet  hydrALAZINE 50 MG tablet  metoprolol tartrate 50 MG tablet  sennosides-docusate 8.6-50 MG per tablet  sodium chloride 0.65 % nasal spray         Discharge Diet:   Diet Instructions       Diet: Regular/House Diet; Regular (IDDSI 7); Thin (IDDSI 0)      Discharge Diet: Regular/House Diet    Texture: Regular (IDDSI 7)    Fluid Consistency: Thin (IDDSI 0)          Diet Order   Procedures    Diet: Regular/House; No Straw; Fluid Consistency: Thin (IDDSI 0)       Activity at Discharge:   Activity Instructions       Discharge Activity      1) No driving until cleared by us and no longer taking narcotics.   2) Off work/school until cleared by us.  3) May shower but no submerging wound in tub, pool, etc.  4) Do not lift / push / pull more then 5 lbs.  5.) No overhead activity/ No bending/twisting/impact activity    Other Restrictions (Specify)      Pelvic Rest              Call for: questions or concerns    Follow-up Appointments  Future Appointments   Date Time Provider Department Center   11/20/2024  9:15 AM Monika  "Juan RIVERA MD [facility]   12/2/2024  9:00 AM Jarred Fregoso, DO [facility]      Follow-up Information       Edouard Vazquez MD .    Specialties: Family Medicine, Urgent Care  Why: 2 to 4 weeks  Contact information:  [facility] [address] [place] [address]  [phone]                           Additional Instructions for the Follow-ups that You Need to Schedule       Discharge Follow-up with PCP   As directed       Currently Documented PCP:    Edouard Vazquez MD    PCP Phone Number:    [phone]     Follow Up Details: 2 to 4 weeks        Discharge Follow-up with Specialty: Follow-up for sodium level check at neurosurgery office 11/15/2024.  / Follow-up with Dr. Frank, neurosurgery on 11/20/2024.   As directed      Specialty: Follow-up for sodium level check at neurosurgery office 11/15/2024.  / Follow-up with Dr. Frank, neurosurgery on 11/20/2024.        Discharge Follow-up with Specified Provider: Follow-up with Dr. Murillo as scheduled   As directed      To: Follow-up with Dr. Murillo as scheduled        Notify Physician or Go To The ED For the Following Conditions   As directed      Including but not limited to fever >100.5, chills, wound concerns (redness, swelling, drainage), new symptoms of numbness, tingling, weakness; new or uncontrolled pain despite using prescribed medications    Order Comments: Including but not limited to fever >100.5, chills, wound concerns (redness, swelling, drainage), new symptoms of numbness, tingling, weakness; new or uncontrolled pain despite using prescribed medications                 Test Results Pending at Discharge  Pending Labs       Order Current Status    Tissue Pathology Exam Preliminary result           None    I discussed the discharge instructions with patient, family, and Dr. Frank and Dr. Jose Acuna, APRN  11/12/24  10:54 EST      \"Dictated utilizing Dragon dictation\".      Electronically signed by Juan Frank MD at " 11/12/24 1109

## 2024-11-14 ENCOUNTER — TRANSITIONAL CARE MANAGEMENT TELEPHONE ENCOUNTER (OUTPATIENT)
Dept: CALL CENTER | Facility: HOSPITAL | Age: 34
End: 2024-11-14
Payer: COMMERCIAL

## 2024-11-14 LAB
CYTO UR: NORMAL
DX PRELIMINARY: NORMAL
LAB AP CASE REPORT: NORMAL
LAB AP DIAGNOSIS COMMENT: NORMAL
PATH REPORT.FINAL DX SPEC: NORMAL
PATH REPORT.GROSS SPEC: NORMAL

## 2024-11-14 NOTE — OUTREACH NOTE
Call Center TCM Note      Flowsheet Row Responses   [facility] patient discharged from? [place]   Does the patient have one of the following disease processes/diagnoses(primary or secondary)? General Surgery   TCM attempt successful? No   Unsuccessful attempts Attempt 3            Radhika Asher RN    11/14/2024, 08:25 EST

## 2024-11-18 ENCOUNTER — OFFICE VISIT (OUTPATIENT)
Dept: FAMILY MEDICINE CLINIC | Facility: CLINIC | Age: 34
End: 2024-11-18
Payer: COMMERCIAL

## 2024-11-18 VITALS
RESPIRATION RATE: 20 BRPM | HEIGHT: 74 IN | OXYGEN SATURATION: 98 % | SYSTOLIC BLOOD PRESSURE: 126 MMHG | WEIGHT: 315 LBS | BODY MASS INDEX: 40.43 KG/M2 | HEART RATE: 91 BPM | DIASTOLIC BLOOD PRESSURE: 70 MMHG

## 2024-11-18 DIAGNOSIS — R03.0 ELEVATED BLOOD PRESSURE, SITUATIONAL: Primary | ICD-10-CM

## 2024-11-18 NOTE — PROGRESS NOTES
Subjective   Johnny Duran is a 34 y.o. male.   Chief Complaint   Patient presents with    hospital f/u       History of Present Illness  He is s/p Pituitary macroadenoma with extrasellar extension excision.     The patient will need follow-up with the primary care provider for surveillance labs and periodic check of basic metabolic panel.  I have ordered the patient to get a sodium level check this Friday on 11/15.  He will follow-up with his PCP regarding this lab value.  I also spoke with him regarding the fact that during his hospital stay he was placed on 3 blood pressure medications.  He does not seem to experience any hypotension with the addition of these 3 antihypertensives.  His systolic blood pressure is ranging anywhere from 114-144 over the past day or so.  I urged him that he will need to follow-up with his primary care provider soon as possible for further management of hypertension and management of current medications.  We will prescribe him a months worth of the antihypertensives metoprolol, amlodipine and hydralazine.  He will need to notify his PCP for any further issues with blood pressure.       The following portions of the patient's history were reviewed and updated as appropriate: allergies, current medications, past family history, past medical history, past social history, past surgical history, and problem list.    Patient Active Problem List   Diagnosis    Class 3 severe obesity due to excess calories with serious comorbidity and body mass index (BMI) of 60.0 to 69.9 in adult    Nondisplaced fracture of fifth left metatarsal bone    Traumatic arthritis of right knee    Pain in left foot    Vitamin D deficiency    Testosterone deficiency    Plantar fasciitis of right foot    COVID-19    Meralgia paresthetica of left side    Pituitary macroadenoma with extrasellar extension       Current Outpatient Medications on File Prior to Visit   Medication Sig Dispense Refill    acetaminophen  (TYLENOL) 325 MG tablet Take 2 tablets by mouth Every 4 (Four) Hours As Needed for Mild Pain for up to 7 days. 28 tablet 0    amLODIPine (NORVASC) 10 MG tablet Take 1 tablet by mouth Daily for 30 days. 30 tablet 0    amoxicillin-clavulanate (AUGMENTIN) 875-125 MG per tablet Take 1 tablet by mouth Every 12 (Twelve) Hours for 23 doses. Currently on day 3. Taken morning dose at the hospital and will take your next dose at 9:00 pm 11/12. 23 tablet 0    hydrALAZINE (APRESOLINE) 50 MG tablet Take 1 tablet by mouth Every 8 (Eight) Hours for 30 days. 90 tablet 0    metoprolol tartrate (LOPRESSOR) 50 MG tablet Take 1 tablet by mouth Every 12 (Twelve) Hours for 30 days. 60 tablet 0    sennosides-docusate (PERICOLACE) 8.6-50 MG per tablet Take 2 tablets by mouth 2 (Two) Times a Day for 10 days. 40 tablet 0    sodium chloride 0.65 % nasal spray Administer 2 sprays into the nostril(s) as directed by provider Every 2 (Two) Hours While Awake for 14 days. 44 mL 7     No current facility-administered medications on file prior to visit.     Current outpatient and discharge medications have been reconciled for the patient.  Reviewed by: Edouard Vazquez MD      Allergies   Allergen Reactions    Codeine Hives    Bee Venom Swelling       Review of Systems   Constitutional:  Negative for activity change, appetite change, fatigue and fever.   HENT:  Negative for ear pain, swollen glands and voice change.    Eyes:  Negative for visual disturbance.   Respiratory:  Negative for shortness of breath and wheezing.    Cardiovascular:  Negative for chest pain and leg swelling.   Gastrointestinal:  Negative for abdominal pain, blood in stool, constipation, diarrhea, nausea and vomiting.   Endocrine: Negative for polydipsia and polyuria.   Genitourinary:  Negative for dysuria, frequency and hematuria.   Musculoskeletal:  Negative for joint swelling, neck pain and neck stiffness.   Skin:  Negative for rash and wound.   Neurological:  Negative  "for weakness, numbness and headache.   Psychiatric/Behavioral:  Negative for suicidal ideas and depressed mood.      I have reviewed and confirmed the accuracy of the ROS as documented by the MA/LPN/RN Edouard Vazquez MD    Objective   Visit Vitals  /70 (BP Location: Right arm, Patient Position: Sitting, Cuff Size: Large Adult)   Pulse 91   Resp 20   Ht 186.7 cm (73.5\")   Wt (!) 228 kg (502 lb 12.8 oz)   SpO2 98%   BMI 65.43 kg/m²      **  Physical Exam  Constitutional:       Appearance: He is well-developed.   HENT:      Head: Normocephalic and atraumatic.      Right Ear: External ear normal.      Left Ear: External ear normal.      Nose: Nose normal.   Eyes:      Pupils: Pupils are equal, round, and reactive to light.   Cardiovascular:      Rate and Rhythm: Normal rate and regular rhythm.      Heart sounds: Normal heart sounds.   Pulmonary:      Effort: Pulmonary effort is normal.      Breath sounds: Normal breath sounds.   Abdominal:      General: Bowel sounds are normal.      Palpations: Abdomen is soft.   Musculoskeletal:         General: Normal range of motion.      Cervical back: Normal range of motion and neck supple.   Skin:     General: Skin is warm and dry.   Neurological:      Mental Status: He is alert and oriented to person, place, and time.   Psychiatric:         Behavior: Behavior normal.         Thought Content: Thought content normal.         Judgment: Judgment normal.       Derm Physical Exam  Ortho Exam   Neurological Exam  Mental Status  Alert. Oriented to person, place, and time.    Cranial Nerves  CN III, IV, VI: Pupils equal round and reactive to light bilaterally.         Assessment & Plan  Elevated blood pressure, situational  Continue meds for now. Reevaluate in 2 weeks. Check BP at home     Orders:    Basic metabolic panel     History of diabetes insipidus post tumor resection. Seems to be better. Recheck BMP today, watch sodium         Expected course, medications, and " adverse effects discussed as appropriate.  Call or return if worsening or persistent symptoms.     This document is intended for medical professional use only.   Electronically signed by Edouard Vazquez MD on 11/18/2024. This content may not have been proofread.

## 2024-11-19 LAB
BUN SERPL-MCNC: 12 MG/DL (ref 6–20)
BUN/CREAT SERPL: 19 (ref 9–20)
CALCIUM SERPL-MCNC: 9.5 MG/DL (ref 8.7–10.2)
CHLORIDE SERPL-SCNC: 103 MMOL/L (ref 96–106)
CO2 SERPL-SCNC: 24 MMOL/L (ref 20–29)
CREAT SERPL-MCNC: 0.64 MG/DL (ref 0.76–1.27)
EGFRCR SERPLBLD CKD-EPI 2021: 127 ML/MIN/1.73
GLUCOSE SERPL-MCNC: 96 MG/DL (ref 70–99)
POTASSIUM SERPL-SCNC: 4.3 MMOL/L (ref 3.5–5.2)
SODIUM SERPL-SCNC: 142 MMOL/L (ref 134–144)

## 2024-11-19 NOTE — PROGRESS NOTES
Patient ID: Johnny Duran is a 34 y.o. male is an established patient with pituitary macroadenoma with extrasellar extension who has previously undergone transsphenoidal resection of pituitary adenoma on 11/05/2024.    Subjective:     History of Present Illness  Martinez is doing well after surgery.  He has noticed some serosanguineous drainage from his nose but nothing clear.  He has no headaches when he stands up or any about the positional symptoms of nausea or lightheadedness.  His incision appears to be well-healing over his knee.  He is no longer taking desmopressin his recent sodium was 141.      Review of Systems   Eyes:  Negative for visual disturbance.   Gastrointestinal:  Negative for nausea and vomiting.   Musculoskeletal:  Negative for neck pain and neck stiffness.   Neurological:  Negative for dizziness, weakness, light-headedness, numbness and headaches.   Psychiatric/Behavioral:  Positive for confusion and decreased concentration.         While in the room and during my examination of the patient I wore a mask and eye protection.  I washed my hands before and after this patient encounter.  The patient was also wearing a mask.    The following portions of the patient's history were reviewed and updated as appropriate: allergies, current medications, past family history, past medical history, past social history, past surgical history and problem list.     Objective:    Vitals:    11/20/24 0847   BP: 112/68   Pulse: 83   Resp: 16   Temp: 97.3 °F (36.3 °C)   SpO2: 95%     Body mass index is 65.37 kg/m².    Physical Exam  Constitutional:       General: He is awake.      Appearance: Normal appearance.   HENT:      Head: Normocephalic and atraumatic.   Eyes:      General: Lids are normal.      Extraocular Movements: Extraocular movements intact.      Conjunctiva/sclera: Conjunctivae normal.      Pupils: Pupils are equal, round, and reactive to light.   Cardiovascular:      Rate and Rhythm: Normal rate and  regular rhythm.      Pulses: Normal pulses.   Pulmonary:      Breath sounds: Normal breath sounds.   Abdominal:      Palpations: Abdomen is soft.   Musculoskeletal:         General: Normal range of motion.      Cervical back: Normal range of motion and neck supple.   Skin:     General: Skin is warm and dry.   Neurological:      Mental Status: He is alert and oriented to person, place, and time.      Motor: Motor function is intact. No weakness or atrophy.      Coordination: Coordination is intact. Romberg sign negative.      Gait: Gait is intact. Gait normal.      Deep Tendon Reflexes: Reflexes are normal and symmetric.      Reflex Scores:       Tricep reflexes are 2+ on the right side and 2+ on the left side.       Bicep reflexes are 2+ on the right side and 2+ on the left side.       Brachioradialis reflexes are 2+ on the right side and 2+ on the left side.       Patellar reflexes are 2+ on the right side and 2+ on the left side.       Achilles reflexes are 2+ on the right side and 2+ on the left side.      Neurological Exam  Mental Status  Awake and alert. Oriented to person, place and time. Oriented to person, place, and time.    Cranial Nerves  CN II: Visual acuity is normal. Visual fields full to confrontation.  CN III, IV, VI: Extraocular movements intact bilaterally. Normal lids and orbits bilaterally. Pupils equal round and reactive to light bilaterally.  CN V: Facial sensation is normal.  CN VII: Full and symmetric facial movement.  CN IX, X: Palate elevates symmetrically. Normal gag reflex.  CN XI: Shoulder shrug strength is normal.  CN XII: Tongue midline without atrophy or fasciculations.    Motor                                               Right                     Left  Deltoid                                   5                          5   Biceps                                   5                          5   Iliopsoas                               5                          5   Quadriceps                            5                          5   Hamstring                             5                          5   Gastrocnemius                     5                           5   Anterior tibialis                      5                          5    Sensory  Sensation is intact to light touch, pinprick, vibration and proprioception in all four extremities.    Reflexes  Deep tendon reflexes are 2+ and symmetric in all four extremities.                                            Right                      Left  Brachioradialis                    2+                         2+  Biceps                                 2+                         2+  Triceps                                2+                         2+  Patellar                                2+                         2+  Achilles                                2+                         2+    Coordination    Finger-to-nose, rapid alternating movements and heel-to-shin normal bilaterally without dysmetria.    Gait   Normal gait. Normal gait. Romberg is absent.       Results: No new neuroimaging.    Assessment/Plan: His sodium has been at normal level without the need for desmopressin.  He is drinking to thirst.  He has no other neurological symptoms at this time.  Will plan to have his MRI done outpatient an open MRI due to his body habitus.  I would like to see him back after his MRI is completed.       Diagnoses and all orders for this visit:    1. Pituitary macroadenoma with extrasellar extension (Primary)                Juan Frank MD  11/20/24  09:22 EST

## 2024-11-20 ENCOUNTER — OFFICE VISIT (OUTPATIENT)
Dept: NEUROSURGERY | Facility: CLINIC | Age: 34
End: 2024-11-20
Payer: COMMERCIAL

## 2024-11-20 VITALS
DIASTOLIC BLOOD PRESSURE: 68 MMHG | WEIGHT: 315 LBS | RESPIRATION RATE: 16 BRPM | OXYGEN SATURATION: 95 % | TEMPERATURE: 97.3 F | SYSTOLIC BLOOD PRESSURE: 112 MMHG | HEIGHT: 74 IN | BODY MASS INDEX: 40.43 KG/M2 | HEART RATE: 83 BPM

## 2024-11-20 DIAGNOSIS — D35.2 PITUITARY MACROADENOMA WITH EXTRASELLAR EXTENSION: Primary | ICD-10-CM

## 2024-11-20 PROCEDURE — 99024 POSTOP FOLLOW-UP VISIT: CPT | Performed by: NEUROLOGICAL SURGERY

## 2024-11-20 NOTE — OP NOTE
Attending Physicians:  MD Juan Pastor MD (neurosurgery, co-surgeon)    Preoperative diagnoses:   Pituitary macroadenoma with extrasellar extension  Nasal septal deviation  Nasal obstruction  Right chronic maxillary sinusitis    Postoperative diagnoses:   1.   Pituitary macroadenoma with extrasellar extension  2.   Nasal septal deviation  3.   Nasal obstruction  4.   Right chronic maxillary sinusitis    Procedures performed:  Endoscopic trans-sphenoidal approach for resection of pituitary tumor  Right vascularized naso-septal flap based on the posterior septal artery   Endoscopic septoplasty  Right maxillary antrostomy with tissue removal  Stereotactic image guidance, extradural (neuronavigation)    Findings:  Endoscopic approach to the sella via endonasal transsphenoidal corridor  Posterior nasal septal deviation with large bony spur s/p endoscopic  septoplasty  Right middle turbinate resected as part of the approach.  Specimen was sent for permanent histopathological evaluation  Following tumor resection, a high-flow CSF leak was noted requiring skull base reconstruction.  Preservation of left nasoseptal flap via rescue flap incision and technique. Harvested the right naso-septal flap for use in reconstruction.  Skull base reconstruction performed using a small fat graft and DuraGen followed by fascia and then a naso-septal flap over-lay. This was secured in position by using Surgicel over the peripheral edges followed by Adherus.  The skull base reconstruction was bolstered by bilateral gloved merocels, supported by Flores splints, which were affixed to the anterior nasal septum using a 2.0 Nylon suture.    Anesthesia: General    Indications:  Patient is a 34-year old male who was determined to have a sellar mass. The patient was referred to Dr. Juan Frank (neurosurgeon) here at [facility] for further evaluation and management. Patient was determined to benefit form the above procedures. I was asked by  Dr. Juan Frank, neurosurgery, to assist with an endonasal endoscopic approach to the sella. I met with the patient in clinic and presented the risks, benefits, and alternatives of undergoing the aforementioned procedures with the understanding that I would be assisting Dr. Juan Frank in the approach and reconstruction endonasally. The patient wished to proceed. Informed consents were signed in the pre-operative room.    Procedure in detail:  The patient was brought to the operating room and placed in supine position on the operating table. General endotracheal anesthesia was induced. The operating table was rotated 90 degrees towards the operating surgeons. A perioperative timeout was performed, whereby the patient, procedure, and perioperative care were confirmed. All members of the team agreed. The patient was prepped and draped in the usual fashion for endoscopic skull base surgery.    A 0-degree nasal endoscope was used to examine bilateral nasal cavities.     The [employer] image guidance system was brought into the field and calibrated for guidance. It was determined to be accurate for the purposes of this procedure. I used the image guidance during the extradural portion of the procedure.    We began by in-fracture and out-fracture of the inferior turbinates. Then, 1% lidocaine with 1:100,000 of epinephrine was injected into bilateral nasal cavities and then the nasal cavities were decongested using topical epinephrine 1:1000 soaked pledgets. Once decongestion was achieved, I proceeded with the endoscopic septoplasty portion of the procedure.  A right sided [place] incision was created and bilateral mucoperichondrial flaps were elevated.  The deviated portions of cartilage and bone were removed. Careful attention was made to preserve at least 1.5cm of the caudal septum anteriorly.  The mucoperichondrial flaps were reapproximated anteriorly using interrupted 4.0 Vicryl suture.    Next, attention was  turned to the right maxillary sinus. The uncinate process was completely excised using a backbiter and curved microdebrider with visualization from a 0 and 30 degree scope. The natural sinus ostium was included in the wide antrostomy. Tissue was removed from the maxillary sinus and sent for permnanent pathology.     Next, the middle turbinate and the superior turbinates bilaterally were lateralized for access to the sphenoid ostium. The right middle turbinate was then resected using curved endoscopic scissors. A superior and inferior remnant were left, which were cauterized using a monopolar electrocautery.   Using image guidance as well as direct visualization, the sphenoid sinus ostium were seen bilaterally. The posterior ethmoid cells were opened in a medial to lateral fashion, and the posterior ethmoid partitions were carefully removed using through-cutting instrumentation including Kerrison as well as a microdebrider to remove the morcellated mucosa and bone. The posterior ethmoid skull base as well as the lamina papyracea were skeletonized.      Next, the lower one-third of the superior turbinate was bilaterally resected using a thru-cutting instrument to create room. The sphenoid sinus ostium was entered using a caudal elevator. It was dilated inferiorly and laterally. A #2 Kerrison was used to create a sphenoidotomy and a Hosemann punch was used to dilated the sphenoid sinus ostium. This was performed bilaterally.The sphenoid sinus ostium was dilated superiorly, medially, and laterally using a combination of thru-cutting instruments as well as microdebrider.     At this point, a caudal was used to to traverse the superior and posterior septum just shy of the head of the middle turbinates bilaterally. A perforation was created and a thru-cutting instrument was used to create the posterior septectomy in an anterior to posterior fashion, taking care to be above the sphenoid sinus openings bilaterally to  preserve the nasoseptal flaps and their respective arterial supplies. A microdebrider was used to remove the superior portion of the septum, and a Coleman was used to reflect the mucosa down towards the choana bilaterally. Part of the sphenoid rostrum was removed. The sphenoid sinus ostia were extended bilaterally and superiorly for additional access.     At this point, the neurosurgical team scrubbed in and performed the intracranial portions of the procedure. During the procedure, I assisted with visualization by holding the endoscope. At the end of the neurosurgical portion, a high flow CSF leak was noted.     I proceeded with harvest of a right nasoseptal flap. I used a paddle tip monopolar electrocautery to create the incision on the superior aspect of the choana extending laterally, this incision was then brought anteriorly along the posterior septum then along the junction of the nasal floor and the septum anteriorly. Needle tip monopolar electrocautery was used to mason out the superior incisions. The anterior incision, Waldemar, was already previously made during the septoplasty portion of the procedure. The flap was then elevated with a caudal elevator in an anterior posterior fashion. The superior incision was completed using an endoscopic scissor.  The flap was then rotated into the nasopharynx.     Skull base reconstruction performed using a small fat graft and DuraGen followed by fascia and then a naso-septal flap over-lay. This was secured in position by using Surgicel over the peripheral edges followed by Adherus.     Next, hemostasis was achieved using monopolar electrocautery. Copious saline irrigation was performed, and hemostasis was confirmed.      The skull base reconstruction was bolstered by bilateral gloved merocels, supported by Flores splints, which were affixed to the anterior nasal septum using a 2.0 Nylon suture.    The attending physician myself, Dr. Kendall Murillo, was present for the  entire procedure, and I performed all aspects of the Otolaryngology portion of this procedure.      Complications: None

## 2024-12-02 ENCOUNTER — OFFICE VISIT (OUTPATIENT)
Dept: ENDOCRINOLOGY | Age: 34
End: 2024-12-02
Payer: COMMERCIAL

## 2024-12-02 VITALS
DIASTOLIC BLOOD PRESSURE: 82 MMHG | BODY MASS INDEX: 40.43 KG/M2 | OXYGEN SATURATION: 97 % | HEIGHT: 74 IN | WEIGHT: 315 LBS | HEART RATE: 99 BPM | SYSTOLIC BLOOD PRESSURE: 134 MMHG

## 2024-12-02 DIAGNOSIS — D35.2 PITUITARY MACROADENOMA WITH EXTRASELLAR EXTENSION: Primary | ICD-10-CM

## 2024-12-02 PROCEDURE — 99213 OFFICE O/P EST LOW 20 MIN: CPT | Performed by: INTERNAL MEDICINE

## 2024-12-02 NOTE — PROGRESS NOTES
Chief complaint/Reason for consult: pituitary macroadenoma    HPI:   - 34 year old male here for a nonfunctional pituitary macroadenoma  - Last seen in 7/2024  - Had transsphenoidal resection in 11/5/2024  - He presented to an ER in May 2024 with dizziness and CT showed a pituitary mass  - MRI in 6/2024 showed a 22 x 26 mm adenoma with  invasion of both cavernous sinuses, greater on the left including encasement of the ICA which remains patent. Suprasellar extent is noted   anteriorly, with the optic chiasm not specifically compressed  - He denies any symptoms at this time    The following portions of the patient's history were reviewed and updated as appropriate: allergies, current medications, past family history, past medical history, past social history, past surgical history, and problem list.      Objective     Vitals:    12/02/24 0850   BP: 134/82   Pulse: 99   SpO2: 97%        Physical Exam  Vitals reviewed.   Constitutional:       Appearance: Normal appearance. He is obese.   HENT:      Head: Normocephalic and atraumatic.   Eyes:      General: No scleral icterus.  Pulmonary:      Effort: Pulmonary effort is normal. No respiratory distress.   Neurological:      Mental Status: He is alert.      Gait: Gait normal.   Psychiatric:         Mood and Affect: Mood normal.         Behavior: Behavior normal.         Thought Content: Thought content normal.         Judgment: Judgment normal.         Assessment & Plan   Pituitary macroadenoma, nonfunctional  - His prolactin was previously elevated likely from stalk effect given degree of prolactin elevation  - Had transsphenoidal resection in 11/5/2024 and follows with neurosurgery  - Will order lab evaluation of pituitary hormones and results will determine further management and follow-up

## 2024-12-03 NOTE — PROGRESS NOTES
Subjective   Johnny Duran is a 34 y.o. male.   Chief Complaint   Patient presents with    f/u surgery       History of Present Illness  Follow up BP     Also some frontal headache as well, seems to be worse at night   Had some pain medication postop for this but ran out        The following portions of the patient's history were reviewed and updated as appropriate: allergies, current medications, past family history, past medical history, past social history, past surgical history, and problem list.    Patient Active Problem List   Diagnosis    Class 3 severe obesity due to excess calories with serious comorbidity and body mass index (BMI) of 60.0 to 69.9 in adult    Nondisplaced fracture of fifth left metatarsal bone    Traumatic arthritis of right knee    Pain in left foot    Vitamin D deficiency    Testosterone deficiency    Plantar fasciitis of right foot    COVID-19    Meralgia paresthetica of left side    Pituitary macroadenoma with extrasellar extension       Current Outpatient Medications on File Prior to Visit   Medication Sig Dispense Refill    amLODIPine (NORVASC) 10 MG tablet Take 1 tablet by mouth Daily for 30 days. 30 tablet 0    metoprolol tartrate (LOPRESSOR) 50 MG tablet Take 1 tablet by mouth Every 12 (Twelve) Hours for 30 days. 60 tablet 0    [DISCONTINUED] hydrALAZINE (APRESOLINE) 50 MG tablet Take 1 tablet by mouth Every 8 (Eight) Hours for 30 days. 90 tablet 0     No current facility-administered medications on file prior to visit.     Current outpatient and discharge medications have been reconciled for the patient.  Reviewed by: Edouard Vazquez MD      Allergies   Allergen Reactions    Codeine Hives    Bee Venom Swelling       Review of Systems   Constitutional:  Negative for activity change, appetite change, fatigue and fever.   HENT:  Negative for ear pain, swollen glands and voice change.    Eyes:  Negative for visual disturbance.   Respiratory:  Negative for shortness of breath  "and wheezing.    Cardiovascular:  Negative for chest pain and leg swelling.   Gastrointestinal:  Negative for abdominal pain, blood in stool, constipation, diarrhea, nausea and vomiting.   Endocrine: Negative for polydipsia and polyuria.   Genitourinary:  Negative for dysuria, frequency and hematuria.   Musculoskeletal:  Negative for joint swelling, neck pain and neck stiffness.   Skin:  Negative for rash and wound.   Neurological:  Negative for weakness, numbness and headache.   Psychiatric/Behavioral:  Negative for suicidal ideas and depressed mood.      I have reviewed and confirmed the accuracy of the ROS as documented by the MA/LPN/RN Edouard Vazquez MD    Objective   Visit Vitals  /64 (BP Location: Right arm, Patient Position: Sitting, Cuff Size: Large Adult)   Pulse 77   Resp 20   Ht 186.7 cm (73.5\")   Wt (!) 228 kg (503 lb)   SpO2 97%   BMI 65.46 kg/m²      **  Physical Exam  Constitutional:       Appearance: He is well-developed.   HENT:      Head: Normocephalic and atraumatic.      Right Ear: External ear normal.      Left Ear: External ear normal.      Nose: Nose normal.   Eyes:      Pupils: Pupils are equal, round, and reactive to light.   Cardiovascular:      Rate and Rhythm: Normal rate and regular rhythm.      Heart sounds: Normal heart sounds.   Pulmonary:      Effort: Pulmonary effort is normal.      Breath sounds: Normal breath sounds.   Abdominal:      General: Bowel sounds are normal.      Palpations: Abdomen is soft.   Musculoskeletal:         General: Normal range of motion.      Cervical back: Normal range of motion and neck supple.   Skin:     General: Skin is warm and dry.   Neurological:      Mental Status: He is alert and oriented to person, place, and time.   Psychiatric:         Behavior: Behavior normal.         Thought Content: Thought content normal.         Judgment: Judgment normal.       Derm Physical Exam  Ortho Exam   Neurological Exam  Mental Status  Alert. Oriented " to person, place, and time.    Cranial Nerves  CN III, IV, VI: Pupils equal round and reactive to light bilaterally.         Assessment & Plan  Sinus headache    Orders:    montelukast (Singulair) 10 MG tablet; Take 1 tablet by mouth Every Night.    Pituitary macroadenoma    Orders:    traMADol (ULTRAM) 50 MG tablet; Take 1 tablet by mouth Every 6 (Six) Hours As Needed for Severe Pain for up to 7 days.    Elevated blood pressure, situational  Hold hydralazine, continue norvasc and metoprolol for now. Check BP at home         Findings discussed. All questions answered.    Follow up in 3 months for recheck, sooner for concerns.     Expected course, medications, and adverse effects discussed as appropriate.  Call or return if worsening or persistent symptoms.     This document is intended for medical professional use only.   Electronically signed by Edouard Vazquez MD on 12/04/2024. This content may not have been proofread.

## 2024-12-04 ENCOUNTER — OFFICE VISIT (OUTPATIENT)
Dept: FAMILY MEDICINE CLINIC | Facility: CLINIC | Age: 34
End: 2024-12-04
Payer: COMMERCIAL

## 2024-12-04 VITALS
WEIGHT: 315 LBS | SYSTOLIC BLOOD PRESSURE: 100 MMHG | RESPIRATION RATE: 20 BRPM | DIASTOLIC BLOOD PRESSURE: 64 MMHG | OXYGEN SATURATION: 97 % | HEIGHT: 74 IN | BODY MASS INDEX: 40.43 KG/M2 | HEART RATE: 77 BPM

## 2024-12-04 DIAGNOSIS — R51.9 SINUS HEADACHE: Primary | ICD-10-CM

## 2024-12-04 DIAGNOSIS — R03.0 ELEVATED BLOOD PRESSURE, SITUATIONAL: ICD-10-CM

## 2024-12-04 DIAGNOSIS — D35.2 PITUITARY MACROADENOMA: ICD-10-CM

## 2024-12-04 PROCEDURE — 99213 OFFICE O/P EST LOW 20 MIN: CPT | Performed by: FAMILY MEDICINE

## 2024-12-04 RX ORDER — TRAMADOL HYDROCHLORIDE 50 MG/1
50 TABLET ORAL EVERY 6 HOURS PRN
Qty: 28 TABLET | Refills: 0 | Status: SHIPPED | OUTPATIENT
Start: 2024-12-04 | End: 2024-12-11

## 2024-12-04 RX ORDER — MONTELUKAST SODIUM 10 MG/1
10 TABLET ORAL NIGHTLY
Qty: 30 TABLET | Refills: 2 | Status: SHIPPED | OUTPATIENT
Start: 2024-12-04

## 2024-12-06 ENCOUNTER — TELEPHONE (OUTPATIENT)
Dept: NEUROSURGERY | Facility: CLINIC | Age: 34
End: 2024-12-06
Payer: COMMERCIAL

## 2024-12-06 LAB
CYTO UR: NORMAL
DX PRELIMINARY: NORMAL
LAB AP CASE REPORT: NORMAL
LAB AP DIAGNOSIS COMMENT: NORMAL
PATH REPORT.ADDENDUM SPEC: NORMAL
PATH REPORT.FINAL DX SPEC: NORMAL
PATH REPORT.GROSS SPEC: NORMAL

## 2024-12-06 NOTE — TELEPHONE ENCOUNTER
[facility] called stating that this patient is having a MRI of the brain on 12/12, and you must obtain the authorization. Authorization needs to received by 12/10 @ 2 pm, if not you will call to and reschedule the MRI. Please call [facility] at [phone] with authorization information

## 2024-12-07 LAB
CORTIS SERPL-MCNC: 9.6 UG/DL (ref 6.2–19.4)
FSH SERPL-ACNC: 0.3 MIU/ML (ref 1.5–12.4)
IGF-I SERPL-MCNC: 55 NG/ML (ref 95–290)
IGF-I Z-SCORE SERPL: -3.2 S.D.
LH SERPL-ACNC: <0.3 MIU/ML (ref 1.7–8.6)
PROLACTIN SERPL-MCNC: 56.5 NG/ML (ref 3.9–22.7)
SODIUM SERPL-SCNC: 141 MMOL/L (ref 136–145)
T4 FREE SERPL-MCNC: 1.26 NG/DL (ref 0.92–1.68)
TESTOST FREE SERPL-MCNC: 1 PG/ML (ref 8.7–25.1)
TESTOST SERPL-MCNC: 13.6 NG/DL (ref 264–916)
TSH SERPL DL<=0.005 MIU/L-ACNC: 1.99 UIU/ML (ref 0.27–4.2)

## 2024-12-09 ENCOUNTER — TELEPHONE (OUTPATIENT)
Dept: ENDOCRINOLOGY | Age: 34
End: 2024-12-09
Payer: COMMERCIAL

## 2024-12-09 NOTE — TELEPHONE ENCOUNTER
----- Message from Jarred Fregoso sent at 12/9/2024 12:54 PM EST -----  Can he have a 4 month follow-up please, thank you

## 2024-12-17 ENCOUNTER — TELEPHONE (OUTPATIENT)
Dept: NEUROSURGERY | Facility: CLINIC | Age: 34
End: 2024-12-17

## 2024-12-17 NOTE — TELEPHONE ENCOUNTER
Caller: VIN    Relationship: SELF    Best call back number: [phone]    What form or medical record are you requesting: OV NOTES & LETTER STATING WHY & HOW LONG THE PATIENT WILL BE OFF OFF OF WORK    Who is requesting this form or medical record from you: [employer]    How would you like to receive the form or medical records (pick-up, mail, fax): FAX    Timeframe paperwork needed: ASAP

## 2024-12-19 NOTE — PROGRESS NOTES
EMERGENCY DEPARTMENT HISTORY AND PHYSICAL EXAM    7:12 PM      Date: 3/10/2024  Patient Name: Salvador Dumont    History of Presenting Illness     Chief Complaint   Patient presents with    Abdominal Pain    Urinary Retention         History Provided By: Patient and medical chart review.    Additional History (Context): Salvador Dumont is a 76 y.o. male with   Past Medical History:   Diagnosis Date    BPH (benign prostatic hyperplasia)     Depression 01/31/2024    Diabetes (HCC)     Hypertension     Kidney disease     Kidney stone     Neuropathy     hands   who presents with complaints of lower abdominal pain and no urine for the past 2 days.  Denies any nausea vomiting diarrhea.  No medications taken to alleviate current symptoms.  Patient reports bowel movements today.  No blood in stool.  Patient currently rating pain 7 out of 10.  Presents via EMS.  Denies any fevers.    PCP: Billy Davies MD    Current Facility-Administered Medications   Medication Dose Route Frequency Provider Last Rate Last Admin    sodium chloride 0.9 % bolus 1,000 mL  1,000 mL IntraVENous Once Lulu Dee APRN - NP        iopamidol (ISOVUE-300) 61 % injection 100 mL  100 mL IntraVENous ONCE PRN Lulu Dee APRN - NP         Current Outpatient Medications   Medication Sig Dispense Refill    aspirin 81 MG EC tablet Take 1 tablet by mouth daily 30 tablet 3    atorvastatin (LIPITOR) 40 MG tablet Take 1 tablet by mouth daily 30 tablet 3    famotidine (PEPCID) 20 MG tablet Take 1 tablet by mouth daily 60 tablet 3    metoprolol succinate (TOPROL XL) 25 MG extended release tablet Take 1 tablet by mouth daily 30 tablet 3    PARoxetine (PAXIL) 40 MG tablet Take 1 tablet by mouth daily 30 tablet 3    ticagrelor (BRILINTA) 90 MG TABS tablet Take 1 tablet by mouth 2 times daily 60 tablet 2    tamsulosin (FLOMAX) 0.4 MG capsule Take 2 capsules by mouth daily      dilTIAZem (CARDIZEM CD) 180 MG extended release capsule Take by mouth daily   Patient ID: Johnny Duran is a 34 y.o. male is here today for follow-up pituitary macroadenoma with extrasellar extension.    Imaging: MRI of the pituitary completed on 12/12/2024    Subjective     The patient is here in regards to   Chief Complaint   Patient presents with    Brain Tumor    Post-op Follow-up       History of Present Illness  Overall seems to be doing well.  He is lost 26 pounds.  He has not had any evidence of CSF leak.  No issues with any additional hormonal imbalance.      While in the room and during my examination of the patient I wore a mask and eye protection.  I washed my hands before and after this patient encounter.  The patient was also wearing a mask.    The following portions of the patient's history were reviewed and updated as appropriate: allergies, current medications, past family history, past medical history, past social history, past surgical history and problem list.    Review of Systems   Eyes:  Negative for visual disturbance.   Gastrointestinal:  Positive for nausea. Negative for vomiting.   Musculoskeletal:  Negative for neck pain and neck stiffness.   Neurological:  Positive for light-headedness and headaches. Negative for dizziness, weakness and numbness.   Psychiatric/Behavioral:  Positive for decreased concentration. Negative for confusion.         Past Medical History:   Diagnosis Date    Anemia     Dizziness     Encounter for screening for malignant neoplasm of rectum     Gallstones     Nausea and vomiting in adult     Nondisplaced fracture of fifth left metatarsal bone     Nondisplaced longitudinal fracture of right patella, initial encounter for closed fracture     Impression: recommend ortho eval, appt today.    Obesity     Obesity, morbid, BMI 40.0-49.9     Overweight (BMI 25.0-29.9)     Plantar fasciitis     right    Screening for depression     Screening for hyperlipidemia     Stress fracture     Traumatic arthritis of right knee        Allergies   Allergen  Reactions    Codeine Hives    Bee Venom Swelling       Family History   Problem Relation Age of Onset    Diabetes Father     Colon cancer Maternal Grandfather     Diabetes Paternal Grandfather     Malig Hyperthermia Neg Hx        Social History     Socioeconomic History    Marital status: Single   Tobacco Use    Smoking status: Never     Passive exposure: Never    Smokeless tobacco: Never   Vaping Use    Vaping status: Never Used   Substance and Sexual Activity    Alcohol use: Not Currently    Drug use: No    Sexual activity: Defer       Past Surgical History:   Procedure Laterality Date    CHOLECYSTECTOMY      ORIF FOOT FRACTURE Left 2016    PLANTAR FASCIA RELEASE Right 10/03/2022    Procedure: ENDOSCOPIC PLANTAR FASCIOTOMY;  Surgeon: CASEY Berman DPM;  Location: [facility] OR;  Service: Podiatry;  Laterality: Right;    TRANSSPHENOIDAL ENDOSCOPIC N/A 11/5/2024    Procedure: TRANSSPHENOIDAL RESECTION OF PITUITARY ADENOMA WITH STEALTH;  Surgeon: Juan Frank MD;  Location: [facility] OR;  Service: Neurosurgery;  Laterality: N/A;    TRANSSPHENOIDAL EXPOSURE Bilateral 11/5/2024    Procedure: TRANSSPHENOIDAL RESECTION OF PITUITARY TUMOR WITH NEUROSURGERY, RIGHT DANIEL BULLOSA RESECTION, RIGHT MAXILLARY ANTROSTOMY, POSSIBLE SEPTOPLASTY, POSSIBLE INFERIOR TURBINATE REDUCTION, POSSIBLE NASO-SEPTAL FLAP, AND IMAGE GUIDANCE;  Surgeon: Kendall Murillo MD;  Location: [facility] OR;  Service: ENT;  Laterality: Bilateral;         Objective     Vitals:    12/20/24 1137   BP: 128/78   Pulse: 94   Resp: 16   Temp: 97.3 °F (36.3 °C)   SpO2: 95%     Body mass index is 64.5 kg/m².    Physical Exam  Constitutional:       General: He is awake.      Appearance: Normal appearance.   HENT:      Head: Normocephalic and atraumatic.   Eyes:      General: Lids are normal.      Extraocular Movements: Extraocular movements intact.      Conjunctiva/sclera: Conjunctivae normal.      Pupils: Pupils are equal, round, and reactive to light.    Cardiovascular:      Rate and Rhythm: Normal rate and regular rhythm.      Pulses: Normal pulses.   Pulmonary:      Breath sounds: Normal breath sounds.   Abdominal:      Palpations: Abdomen is soft.   Musculoskeletal:         General: Normal range of motion.      Cervical back: Normal range of motion and neck supple.   Skin:     General: Skin is warm and dry.   Neurological:      Mental Status: He is alert and oriented to person, place, and time.      Motor: Motor function is intact. No weakness or atrophy.      Coordination: Coordination is intact. Romberg sign negative.      Gait: Gait is intact. Gait normal.      Deep Tendon Reflexes: Reflexes are normal and symmetric.      Reflex Scores:       Tricep reflexes are 2+ on the right side and 2+ on the left side.       Bicep reflexes are 2+ on the right side and 2+ on the left side.       Brachioradialis reflexes are 2+ on the right side and 2+ on the left side.       Patellar reflexes are 2+ on the right side and 2+ on the left side.       Achilles reflexes are 2+ on the right side and 2+ on the left side.        Neurological Exam  Mental Status  Awake and alert. Oriented to person, place and time. Oriented to person, place, and time.    Cranial Nerves  CN II: Visual acuity is normal. Visual fields full to confrontation.  CN III, IV, VI: Extraocular movements intact bilaterally. Normal lids and orbits bilaterally. Pupils equal round and reactive to light bilaterally.  CN V: Facial sensation is normal.  CN VII: Full and symmetric facial movement.  CN IX, X: Palate elevates symmetrically. Normal gag reflex.  CN XI: Shoulder shrug strength is normal.  CN XII: Tongue midline without atrophy or fasciculations.    Motor                                               Right                     Left  Deltoid                                   5                          5   Biceps                                   5                          5   Iliopsoas                                5                          5   Quadriceps                           5                          5   Hamstring                             5                          5   Gastrocnemius                     5                           5   Anterior tibialis                      5                          5    Sensory  Sensation is intact to light touch, pinprick, vibration and proprioception in all four extremities.    Reflexes  Deep tendon reflexes are 2+ and symmetric in all four extremities.                                            Right                      Left  Brachioradialis                    2+                         2+  Biceps                                 2+                         2+  Triceps                                2+                         2+  Patellar                                2+                         2+  Achilles                                2+                         2+    Coordination    Finger-to-nose, rapid alternating movements and heel-to-shin normal bilaterally without dysmetria.    Gait   Normal gait. Normal gait. Romberg is absent.      Assessment & Plan   Independent Review of Radiographic Studies:      I personally reviewed the images from the following studies.    FINDINGS:    MR: MRI of the brain w/wo contrast was reviewed and shows appears to be a gross total resection of previous extrasellar mass.  The pituitary gland itself appears to be in the same position towards the right of the sella    Assessment/Plan: Overall doing well.  Since is so difficult for him to get an MRI, with plan for repeat MRI in 1 year rather than typical 3-month follow-up.    Medical Decision Making:      MRI pituitary with and without contrast         Diagnoses and all orders for this visit:    1. Pituitary macroadenoma with extrasellar extension (Primary)  -     MRI Pituitary With & Without Contrast; Future             Patient Instructions/Recommendations:    Follow-up in 1 year with  MRI      Juan Frank MD  12/20/24  11:52 EST

## 2024-12-20 ENCOUNTER — OFFICE VISIT (OUTPATIENT)
Dept: NEUROSURGERY | Facility: CLINIC | Age: 34
End: 2024-12-20
Payer: COMMERCIAL

## 2024-12-20 VITALS
RESPIRATION RATE: 16 BRPM | HEIGHT: 74 IN | WEIGHT: 315 LBS | DIASTOLIC BLOOD PRESSURE: 78 MMHG | TEMPERATURE: 97.3 F | OXYGEN SATURATION: 95 % | BODY MASS INDEX: 40.43 KG/M2 | HEART RATE: 94 BPM | SYSTOLIC BLOOD PRESSURE: 128 MMHG

## 2024-12-20 DIAGNOSIS — D35.2 PITUITARY MACROADENOMA WITH EXTRASELLAR EXTENSION: Primary | ICD-10-CM

## 2024-12-20 PROCEDURE — 99024 POSTOP FOLLOW-UP VISIT: CPT | Performed by: NEUROLOGICAL SURGERY

## 2025-01-15 ENCOUNTER — TELEPHONE (OUTPATIENT)
Dept: NEUROSURGERY | Facility: CLINIC | Age: 35
End: 2025-01-15
Payer: COMMERCIAL

## 2025-01-16 NOTE — TELEPHONE ENCOUNTER
Caller: VIN BLUM    Relationship: SELF    Best call back number: [phone]    What form or medical record are you requesting: DOCUMENTS OR A LETTER SUPPORTING THAT HE IS OFF UNTIL FEB 3, 2025    Who is requesting this form or medical record from you: [employer]    How would you like to receive the form or medical records (pick-up, mail, fax): FAX  HE THINKS WE HAVE THE NUMBER, THEY HAVE FAXED PAPERWORK    Timeframe paperwork needed: ASAP    Additional notes: PATIENT STATES THAT [employer] IS NEEDING US TO FAX SOMETHING TO THEM STATING THAT PATIENT IS STILL RECOVERING AND OFF WORK  UNTIL FEB. 3, 2025.        
I will send office notes and a letter to his [facility] office to support him being off until 02/03/2025  
Detail Level: Detailed
Size Of Lesion In Cm (Optional): 0

## 2025-03-05 ENCOUNTER — OFFICE VISIT (OUTPATIENT)
Dept: FAMILY MEDICINE CLINIC | Facility: CLINIC | Age: 35
End: 2025-03-05
Payer: COMMERCIAL

## 2025-03-05 VITALS
BODY MASS INDEX: 40.43 KG/M2 | RESPIRATION RATE: 20 BRPM | HEART RATE: 84 BPM | WEIGHT: 315 LBS | DIASTOLIC BLOOD PRESSURE: 90 MMHG | SYSTOLIC BLOOD PRESSURE: 158 MMHG | HEIGHT: 74 IN | OXYGEN SATURATION: 94 %

## 2025-03-05 DIAGNOSIS — I10 PRIMARY HYPERTENSION: Primary | ICD-10-CM

## 2025-03-05 PROCEDURE — 99213 OFFICE O/P EST LOW 20 MIN: CPT | Performed by: FAMILY MEDICINE

## 2025-03-05 RX ORDER — METOPROLOL SUCCINATE 25 MG/1
25 TABLET, EXTENDED RELEASE ORAL DAILY
Qty: 90 TABLET | Refills: 3 | Status: SHIPPED | OUTPATIENT
Start: 2025-03-05

## 2025-03-05 NOTE — PROGRESS NOTES
Subjective   Johnny Duran is a 34 y.o. male.   Chief Complaint   Patient presents with    Hypertension     Hypertension  This is a recurrent problem. The problem has been worse since onset. The problem is uncontrolled. Pertinent negatives include no chest pain, neck pain or shortness of breath. Current antihypertension treatment includes nothing (was doing well on metoprolol).        The following portions of the patient's history were reviewed and updated as appropriate: allergies, current medications, past family history, past medical history, past social history, past surgical history, and problem list.    Patient Active Problem List   Diagnosis    Class 3 severe obesity due to excess calories with serious comorbidity and body mass index (BMI) of 60.0 to 69.9 in adult    Nondisplaced fracture of fifth left metatarsal bone    Traumatic arthritis of right knee    Pain in left foot    Vitamin D deficiency    Testosterone deficiency    Plantar fasciitis of right foot    COVID-19    Meralgia paresthetica of left side    Pituitary macroadenoma with extrasellar extension       Current Outpatient Medications on File Prior to Visit   Medication Sig Dispense Refill    [DISCONTINUED] metoprolol tartrate (LOPRESSOR) 50 MG tablet Take 1 tablet by mouth Every 12 (Twelve) Hours for 30 days. (Patient not taking: Reported on 3/5/2025) 60 tablet 0    [DISCONTINUED] montelukast (Singulair) 10 MG tablet Take 1 tablet by mouth Every Night. (Patient not taking: Reported on 3/5/2025) 30 tablet 2     No current facility-administered medications on file prior to visit.     Current outpatient and discharge medications have been reconciled for the patient.  Reviewed by: Edouard Vazquez MD      Allergies   Allergen Reactions    Codeine Hives    Bee Venom Swelling       Review of Systems   Constitutional:  Negative for activity change, appetite change, fatigue and fever.   HENT:  Negative for ear pain, swollen glands and voice  "change.    Eyes:  Negative for visual disturbance.   Respiratory:  Negative for shortness of breath and wheezing.    Cardiovascular:  Negative for chest pain and leg swelling.   Gastrointestinal:  Negative for abdominal pain, blood in stool, constipation, diarrhea, nausea and vomiting.   Endocrine: Negative for polydipsia and polyuria.   Genitourinary:  Negative for dysuria, frequency and hematuria.   Musculoskeletal:  Negative for joint swelling, neck pain and neck stiffness.   Skin:  Negative for rash and wound.   Neurological:  Negative for weakness, numbness and headache.   Psychiatric/Behavioral:  Negative for suicidal ideas and depressed mood.      I have reviewed and confirmed the accuracy of the ROS as documented by the MA/LPN/RN Edouard Vazquez MD    Objective   Visit Vitals  /90 (BP Location: Right arm, Patient Position: Sitting, Cuff Size: Large Adult)   Pulse 84   Resp 20   Ht 186.7 cm (73.5\")   Wt (!) 220 kg (485 lb)   SpO2 94%   BMI 63.12 kg/m²      **  Physical Exam  Vitals reviewed.   Constitutional:       Appearance: He is well-developed.   HENT:      Head: Normocephalic.      Right Ear: External ear normal.      Left Ear: External ear normal.      Nose: Nose normal.   Eyes:      Conjunctiva/sclera: Conjunctivae normal.   Cardiovascular:      Rate and Rhythm: Normal rate.   Pulmonary:      Effort: Pulmonary effort is normal. No respiratory distress.   Abdominal:      General: Abdomen is flat.   Musculoskeletal:         General: No signs of injury.      Cervical back: Normal range of motion.   Skin:     Findings: No rash.   Neurological:      Mental Status: He is alert and oriented to person, place, and time.   Psychiatric:         Behavior: Behavior normal.         Thought Content: Thought content normal.         Judgment: Judgment normal.       Derm Physical Exam  Ortho Exam   Neurological Exam  Mental Status  Alert. Oriented to person, place, and time.         Assessment & " Plan  Primary hypertension      Orders:    metoprolol succinate XL (Toprol XL) 25 MG 24 hr tablet; Take 1 tablet by mouth Daily.    Enroll patient in hypertension care plan     Findings discussed. All questions answered.  Medication and medication adverse effects discussed.  Drug education given and explained to patient. Patient verbalized understanding.Follow-up for routine health maintenance as indicated.   Class 3 Severe Obesity (BMI >=40). Obesity-related health conditions include the following: hypertension. Obesity is unchanged. BMI is is above average; BMI management plan is completed. We discussed portion control and increasing exercise.      Expected course, medications, and adverse effects discussed as appropriate.  Call or return if worsening or persistent symptoms.     This document is intended for medical professional use only.   Electronically signed by Edouard Vazquez MD on 03/05/2025. This content may not have been proofread.

## 2025-03-13 ENCOUNTER — PATIENT OUTREACH (OUTPATIENT)
Dept: CASE MANAGEMENT | Facility: OTHER | Age: 35
End: 2025-03-13
Payer: COMMERCIAL

## 2025-03-13 NOTE — OUTREACH NOTE
[facility] Hypertension [facility] Enrollment    Date of enrollment order: 3/5/25  Ordering provider: Edouard Vazquez MD  Enrolling provider: Edouard Vazquez MD  Patient has/had own BP monitoring equipment?: yes       Pt has begun entering readings, and most recent bp was WNL. However, did receive a program alert for elevated heart rate (103) with reported lightheadedness/dizziness (see below). Pt stated symptoms are related to a hx of pituitary gland tumor removal and are nothing new.    Pt was prescribed metoprolol at last pcp appt and has been taking it. Discussed importance of med adherence and encd pt to request refills 1-2 weeks before running out to avoid missed doses.        Graciela VALDEZ  Ambulatory Case Management    3/13/2025, 16:23 EDT

## 2025-03-14 NOTE — PLAN OF CARE
Problem: Hypertension  Goal: Track and Manage My Blood Pressure  Intervention: My Blood Pressure Management To Do List  Description: Why is this important?You may not be able to feel high blood pressure, but it can still hurt your body.High blood pressure can cause heart or kidney problems. It can also cause a stroke.Checking your blood pressure at home and at different times of the day can help to control blood pressure.  Flowsheets (Taken 3/13/2025 1623)  My Blood Pressure Management To Do List:   check blood pressure daily   write blood pressure results in a log or diary or use an bere  Goal: Mange My Medicine  Intervention: My Medication Management To Do List  Description: Why is this important?It is important to take your medicine so that you can control your condition and prevent problems.Even if you do not feel your high blood pressure, it is still important to take your medicine.Call the office if you cannot afford the medicine or if you have questions about it.  Flowsheets (Taken 3/13/2025 1623)  My Medication Management To Do List: call for a refill a week before running out of medicine  Goal: Optimal Care Coordination of a Patient Experiencing Hypertension  Intervention: Identify and Monitor Blood Pressure Elevation  Flowsheets (Taken 3/13/2025 1623)  Identify and Monitor Blood Pressure Elevation:   home or ambulatory blood pressure monitoring encouraged   blood pressure equipment and technique reviewed  Intervention: Alleviate Barriers to Medication Regimen  Flowsheets (Taken 3/13/2025 1623)  Alleviate Barriers to Medication Regimen:   strategies for improving adherence encouraged   understanding of current medications assessed

## 2025-03-14 NOTE — PLAN OF CARE
Problem: Hypertension  Goal: Track and Manage My Blood Pressure  Intervention: My Blood Pressure Management To Do List  Description: Why is this important?You may not be able to feel high blood pressure, but it can still hurt your body.High blood pressure can cause heart or kidney problems. It can also cause a stroke.Checking your blood pressure at home and at different times of the day can help to control blood pressure.  Flowsheets (Taken 3/13/2025 1623)  My Blood Pressure Management To Do List:   check blood pressure daily   write blood pressure results in a log or diary or use an bere  Goal: Mange My Medicine  Intervention: My Medication Management To Do List  Description: Why is this important?It is important to take your medicine so that you can control your condition and prevent problems.Even if you do not feel your high blood pressure, it is still important to take your medicine.Call the office if you cannot afford the medicine or if you have questions about it.  Flowsheets (Taken 3/13/2025 1623)  My Medication Management To Do List: call for a refill a week before running out of medicine  Goal: Optimal Care Coordination of a Patient Experiencing Hypertension  Intervention: Identify and Monitor Blood Pressure Elevation  Flowsheets (Taken 3/13/2025 1623)  Identify and Monitor Blood Pressure Elevation: home or ambulatory blood pressure monitoring encouraged  Intervention: Alleviate Barriers to Medication Regimen  Flowsheets (Taken 3/13/2025 1623)  Alleviate Barriers to Medication Regimen:   strategies for improving adherence encouraged   understanding of current medications assessed

## 2025-03-18 ENCOUNTER — TELEPHONE (OUTPATIENT)
Dept: CASE MANAGEMENT | Facility: OTHER | Age: 35
End: 2025-03-18
Payer: COMMERCIAL

## 2025-03-18 DIAGNOSIS — E66.813 CLASS 3 SEVERE OBESITY DUE TO EXCESS CALORIES WITH SERIOUS COMORBIDITY AND BODY MASS INDEX (BMI) OF 60.0 TO 69.9 IN ADULT: Primary | ICD-10-CM

## 2025-03-18 DIAGNOSIS — E66.01 CLASS 3 SEVERE OBESITY DUE TO EXCESS CALORIES WITH SERIOUS COMORBIDITY AND BODY MASS INDEX (BMI) OF 60.0 TO 69.9 IN ADULT: Primary | ICD-10-CM

## 2025-04-11 ENCOUNTER — PATIENT OUTREACH (OUTPATIENT)
Dept: CASE MANAGEMENT | Facility: OTHER | Age: 35
End: 2025-04-11
Payer: COMMERCIAL

## 2025-04-11 NOTE — OUTREACH NOTE
[facility]    Called pt for follow up. He had not entered bps since 4/1/25 but stated he will resume. Encd pt to continue entering bps consistently as he had been doing well previously. No concerns or needs voiced at this time. Will follow up again in 30 days unless needed sooner.    Graciela VALDEZ  Ambulatory Case Management    4/11/2025, 14:52 EDT

## 2025-06-26 NOTE — PROGRESS NOTES
Subjective   Johnny Duran is a 35 y.o. male.   Chief Complaint   Patient presents with    Hypertension     History of Present Illness  The patient is here: for coordination of medical care.  Patient has: minimal activity with work/home activities    HEPATITIS C SCREENING Never done  ANNUAL PHYSICAL due on 02/02/2025  TDAP/TD VACCINES(2 - Tdap) due on 12/24/2025  COVID-19 Vaccine(1 - 2024-25 season) due on 12/27/2025  INFLUENZA VACCINE due on 07/01/2025  Pneumococcal Vaccine 0-49 Aged Out  Current pain scale 0/10.     Hypertension  Chronicity:  Chronic  Onset:  More than 1 month ago  Associated symptoms: no chest pain, no neck pain and no shortness of breath    Agents associated with hypertension:  No associated agents  CAD risks:  Obesity and male gender  Past treatments:  Nothing  Current therapy:  Beta blockers  Compliance problems:  No compliance problems       The following portions of the patient's history were reviewed and updated as appropriate: allergies, current medications, past family history, past medical history, past social history, past surgical history, and problem list.    Patient Active Problem List   Diagnosis    Class 3 severe obesity due to excess calories with serious comorbidity and body mass index (BMI) of 60.0 to 69.9 in adult    Nondisplaced fracture of fifth left metatarsal bone    Traumatic arthritis of right knee    Pain in left foot    Vitamin D deficiency    Testosterone deficiency    Plantar fasciitis of right foot    COVID-19    Meralgia paresthetica of left side    Pituitary macroadenoma with extrasellar extension       Current Outpatient Medications on File Prior to Visit   Medication Sig Dispense Refill    metoprolol succinate XL (Toprol XL) 25 MG 24 hr tablet Take 1 tablet by mouth Daily. 90 tablet 3     No current facility-administered medications on file prior to visit.     Current outpatient and discharge medications have been reconciled for the patient.  Reviewed by: Edouard  "Dillon Vazuqez MD      Allergies   Allergen Reactions    Codeine Hives    Bee Venom Swelling       Review of Systems   Constitutional:  Negative for activity change, appetite change, fatigue and fever.   HENT:  Negative for ear pain, swollen glands and voice change.    Eyes:  Negative for visual disturbance.   Respiratory:  Negative for shortness of breath and wheezing.    Cardiovascular:  Negative for chest pain and leg swelling.   Gastrointestinal:  Negative for abdominal pain, blood in stool, constipation, diarrhea, nausea and vomiting.   Endocrine: Negative for polydipsia and polyuria.   Genitourinary:  Negative for dysuria, frequency and hematuria.   Musculoskeletal:  Negative for joint swelling, neck pain and neck stiffness.   Skin:  Negative for rash and wound.   Neurological:  Negative for weakness, numbness and headache.   Psychiatric/Behavioral:  Negative for suicidal ideas and depressed mood.      I have reviewed and confirmed the accuracy of the ROS as documented by the MA/LPN/RN Edouard Vazquez MD    Objective   Visit Vitals  /84 (BP Location: Right arm, Patient Position: Sitting, Cuff Size: Large Adult)   Pulse 71   Resp 20   Ht 188 cm (74\")   Wt (!) 223 kg (491 lb)   SpO2 94%   BMI 63.04 kg/m²      **  Physical Exam  Constitutional:       Appearance: He is well-developed.   HENT:      Head: Normocephalic and atraumatic.      Right Ear: External ear normal.      Left Ear: External ear normal.      Nose: Nose normal.   Eyes:      Pupils: Pupils are equal, round, and reactive to light.   Cardiovascular:      Rate and Rhythm: Normal rate and regular rhythm.      Heart sounds: Normal heart sounds.   Pulmonary:      Effort: Pulmonary effort is normal.      Breath sounds: Normal breath sounds.   Abdominal:      General: Bowel sounds are normal.      Palpations: Abdomen is soft.   Musculoskeletal:         General: Normal range of motion.      Cervical back: Normal range of motion and neck " supple.   Skin:     General: Skin is warm and dry.   Neurological:      Mental Status: He is alert and oriented to person, place, and time.   Psychiatric:         Behavior: Behavior normal.         Thought Content: Thought content normal.         Judgment: Judgment normal.       Derm Physical Exam  Ortho Exam   Neurological Exam  Mental Status  Alert. Oriented to person, place, and time.    Cranial Nerves  CN III, IV, VI: Pupils equal round and reactive to light bilaterally.         Assessment & Plan  Annual physical exam  Discussed anticipatory guidance, diet, exercise, and weight loss.  Discussed safety and routine screening examinations.  Discussed self-examinations.  Johnny Duran  reports that he has never smoked. He has never been exposed to tobacco smoke. He has never used smokeless tobacco. Orders:    Hepatitis C Antibody    CBC & Differential    Comprehensive Metabolic Panel    Lipid Panel With / Chol / HDL Ratio    Need for hepatitis C screening test  scren  Orders:    Hepatitis C Antibody    Class 3 severe obesity due to excess calories with serious comorbidity and body mass index (BMI) of 60.0 to 69.9 in adult  Patient's (Body mass index is 63.04 kg/m².) indicates that they are morbidly/severely obese (BMI > 40 or > 35 with obesity - related health condition) with health conditions that include hypertension . Weight is unchanged. BMI  is above average; BMI management plan is completed. We discussed portion control and increasing exercise.          Findings discussed. All questions answered.  Medication and medication adverse effects discussed.  Drug education given and explained to patient. Patient verbalized understanding.  Follow-up for routine health maintenance as indicated.      Class 3 Severe Obesity (BMI >=40). Obesity-related health conditions include the following: hypertension. Obesity is unchanged. BMI is is above average; BMI management plan is completed. We discussed portion control and  increasing exercise.       Expected course, medications, and adverse effects discussed as appropriate.  Call or return if worsening or persistent symptoms.     This document is intended for medical professional use only.   Electronically signed by Edouard Vazquez MD on 06/27/2025. This content may not have been proofread.

## 2025-06-27 ENCOUNTER — OFFICE VISIT (OUTPATIENT)
Dept: FAMILY MEDICINE CLINIC | Facility: CLINIC | Age: 35
End: 2025-06-27
Payer: COMMERCIAL

## 2025-06-27 VITALS
WEIGHT: 315 LBS | OXYGEN SATURATION: 94 % | SYSTOLIC BLOOD PRESSURE: 122 MMHG | HEIGHT: 74 IN | RESPIRATION RATE: 20 BRPM | BODY MASS INDEX: 40.43 KG/M2 | DIASTOLIC BLOOD PRESSURE: 84 MMHG | HEART RATE: 71 BPM

## 2025-06-27 DIAGNOSIS — Z11.59 NEED FOR HEPATITIS C SCREENING TEST: ICD-10-CM

## 2025-06-27 DIAGNOSIS — E66.813 CLASS 3 SEVERE OBESITY DUE TO EXCESS CALORIES WITH SERIOUS COMORBIDITY AND BODY MASS INDEX (BMI) OF 60.0 TO 69.9 IN ADULT: ICD-10-CM

## 2025-06-27 DIAGNOSIS — Z00.00 ANNUAL PHYSICAL EXAM: Primary | ICD-10-CM

## 2025-06-27 PROCEDURE — 99395 PREV VISIT EST AGE 18-39: CPT | Performed by: FAMILY MEDICINE

## 2025-06-27 NOTE — ASSESSMENT & PLAN NOTE
Patient's (Body mass index is 63.04 kg/m².) indicates that they are morbidly/severely obese (BMI > 40 or > 35 with obesity - related health condition) with health conditions that include hypertension . Weight is unchanged. BMI  is above average; BMI management plan is completed. We discussed portion control and increasing exercise.

## 2025-06-28 LAB
ALBUMIN SERPL-MCNC: 3.8 G/DL (ref 4.1–5.1)
ALP SERPL-CCNC: 121 IU/L (ref 44–121)
ALT SERPL-CCNC: 12 IU/L (ref 0–44)
AST SERPL-CCNC: 13 IU/L (ref 0–40)
BASOPHILS # BLD AUTO: 0 X10E3/UL (ref 0–0.2)
BASOPHILS NFR BLD AUTO: 1 %
BILIRUB SERPL-MCNC: 0.3 MG/DL (ref 0–1.2)
BUN SERPL-MCNC: 11 MG/DL (ref 6–20)
BUN/CREAT SERPL: 16 (ref 9–20)
CALCIUM SERPL-MCNC: 9 MG/DL (ref 8.7–10.2)
CHLORIDE SERPL-SCNC: 104 MMOL/L (ref 96–106)
CHOLEST SERPL-MCNC: 157 MG/DL (ref 100–199)
CHOLEST/HDLC SERPL: 3.3 RATIO (ref 0–5)
CO2 SERPL-SCNC: 22 MMOL/L (ref 20–29)
CREAT SERPL-MCNC: 0.67 MG/DL (ref 0.76–1.27)
EGFRCR SERPLBLD CKD-EPI 2021: 125 ML/MIN/1.73
EOSINOPHIL # BLD AUTO: 0.1 X10E3/UL (ref 0–0.4)
EOSINOPHIL NFR BLD AUTO: 2 %
ERYTHROCYTE [DISTWIDTH] IN BLOOD BY AUTOMATED COUNT: 14.7 % (ref 11.6–15.4)
GLOBULIN SER CALC-MCNC: 3 G/DL (ref 1.5–4.5)
GLUCOSE SERPL-MCNC: 91 MG/DL (ref 70–99)
HCT VFR BLD AUTO: 34.6 % (ref 37.5–51)
HCV IGG SERPL QL IA: NON REACTIVE
HDLC SERPL-MCNC: 47 MG/DL
HGB BLD-MCNC: 10.5 G/DL (ref 13–17.7)
IMM GRANULOCYTES # BLD AUTO: 0 X10E3/UL (ref 0–0.1)
IMM GRANULOCYTES NFR BLD AUTO: 0 %
LDLC SERPL CALC-MCNC: 97 MG/DL (ref 0–99)
LYMPHOCYTES # BLD AUTO: 2.1 X10E3/UL (ref 0.7–3.1)
LYMPHOCYTES NFR BLD AUTO: 32 %
MCH RBC QN AUTO: 25.3 PG (ref 26.6–33)
MCHC RBC AUTO-ENTMCNC: 30.3 G/DL (ref 31.5–35.7)
MCV RBC AUTO: 83 FL (ref 79–97)
MONOCYTES # BLD AUTO: 0.4 X10E3/UL (ref 0.1–0.9)
MONOCYTES NFR BLD AUTO: 6 %
NEUTROPHILS # BLD AUTO: 3.9 X10E3/UL (ref 1.4–7)
NEUTROPHILS NFR BLD AUTO: 59 %
PLATELET # BLD AUTO: 386 X10E3/UL (ref 150–450)
POTASSIUM SERPL-SCNC: 4.2 MMOL/L (ref 3.5–5.2)
PROT SERPL-MCNC: 6.8 G/DL (ref 6–8.5)
RBC # BLD AUTO: 4.15 X10E6/UL (ref 4.14–5.8)
SODIUM SERPL-SCNC: 140 MMOL/L (ref 134–144)
TRIGL SERPL-MCNC: 66 MG/DL (ref 0–149)
VLDLC SERPL CALC-MCNC: 13 MG/DL (ref 5–40)
WBC # BLD AUTO: 6.5 X10E3/UL (ref 3.4–10.8)

## 2025-06-30 ENCOUNTER — RESULTS FOLLOW-UP (OUTPATIENT)
Dept: FAMILY MEDICINE CLINIC | Facility: CLINIC | Age: 35
End: 2025-06-30
Payer: COMMERCIAL

## (undated) DEVICE — SOL NS 500ML

## (undated) DEVICE — SUT VIC FS2 4/0 27IN J422H

## (undated) DEVICE — SKIN PREP TRAY 4 COMPARTM TRAY: Brand: MEDLINE INDUSTRIES, INC.

## (undated) DEVICE — GLV SURG SENSICARE PI PF LF 7 GRN STRL

## (undated) DEVICE — NDL HYPO PRECISIONGLIDE REG 25G 1 1/2

## (undated) DEVICE — SHEET, DRAPE, SPLIT, STERILE: Brand: MEDLINE

## (undated) DEVICE — TOTAL TRAY, 16FR 10ML SIL FOLEY, URN: Brand: MEDLINE

## (undated) DEVICE — BNDG ESMARK 4IN 12FT LF STRL BLU

## (undated) DEVICE — APPL COTN TP PLSTC 6IN STRL LF PK/2

## (undated) DEVICE — DRSNG WND GZ PAD BORDERED 4X8IN STRL

## (undated) DEVICE — TOOL MR8-T14MH30M MR8 14CM TL MH 2FL 3MM: Brand: MIDAS REX MR8

## (undated) DEVICE — TOWEL,OR,DSP,ST,BLUE,STD,4/PK,20PK/CS: Brand: MEDLINE

## (undated) DEVICE — DRSNG TELFA PAD NONADH STR 1S 3X4IN

## (undated) DEVICE — BLADE 1884380EM QUADCUT 4.3MMX13CM ROHS: Brand: ROTATABLE FUSION®

## (undated) DEVICE — GLV SURG BIOGEL LTX PF 7

## (undated) DEVICE — CODMAN® SURGICAL PATTIES 1/4" X 1/4" (0.64CM X 0.64CM): Brand: CODMAN®

## (undated) DEVICE — DRP SLUSH WARMR MACH CIR 44X44IN

## (undated) DEVICE — BLD KNIF METRIX SHEATHED 160MM

## (undated) DEVICE — GOWN,REINFORCE,POLY,SIRUS,BREATH SLV,XLG: Brand: MEDLINE

## (undated) DEVICE — CONTN STRL 32OZ

## (undated) DEVICE — ANTIBACTERIAL UNDYED BRAIDED (POLYGLACTIN 910), SYNTHETIC ABSORBABLE SUTURE: Brand: COATED VICRYL

## (undated) DEVICE — TUOHY NEEDLE 14G TUOHY NEEDLE: Brand: TUOHY NEEDLE

## (undated) DEVICE — SEALANT WND FIBRIN TISSEEL PREFIL/SYR/PRIMAFZ 10ML

## (undated) DEVICE — GAUZE,SPONGE,FLUFF,6"X6.75",STRL,5/TRAY: Brand: MEDLINE

## (undated) DEVICE — IRRIGATOR BULB ASEPTO 60CC STRL

## (undated) DEVICE — TUBING 1895522 5PK STRAIGHTSHOT TO XPS: Brand: STRAIGHTSHOT®

## (undated) DEVICE — GLV SURG BIOGEL M LTX PF 7 1/2

## (undated) DEVICE — PENCL HND ROCKRSWTCH HOLSTR EZ CLEAN TP CRD 10FT

## (undated) DEVICE — PK EXTREM 50

## (undated) DEVICE — ST TBG CLEARVISION 2 IRRG

## (undated) DEVICE — KIT,ANTI FOG,W/SPONGE & FLUID,SOFT PACK: Brand: MEDLINE

## (undated) DEVICE — DRESSING 440402 MEROCEL 10PK STD 8CM: Brand: MEROCEL

## (undated) DEVICE — NC TREK CORONARY DILATATION CATHETER 4.50 MM X 15 MM / OVER-THE-WIRE: Brand: NC TREK

## (undated) DEVICE — PK NEURO SPINE 40

## (undated) DEVICE — MARKR SKIN W/RULR 2TP

## (undated) DEVICE — SPLINT 1524050 5PK PAIR DOYLE II AIRWAY: Brand: DOYLE II ™

## (undated) DEVICE — BANDAGE,GAUZE,BULKEE II,4.5"X4.1YD,STRL: Brand: MEDLINE

## (undated) DEVICE — CONN TBG Y 5 IN 1 LF STRL

## (undated) DEVICE — DRSNG WND BORDR/ADHS NONADHR/GZ LF 4X4IN STRL

## (undated) DEVICE — MAGNETIC INSTR DRAPE 20X16: Brand: MEDLINE INDUSTRIES, INC.

## (undated) DEVICE — ELECTRD BLD EZ CLN MOD XLNG 2.75IN

## (undated) DEVICE — PATIENT TRACKER 9734887 NON-INVASIVE

## (undated) DEVICE — UNDYED BRAIDED (POLYGLACTIN 910), SYNTHETIC ABSORBABLE SUTURE: Brand: COATED VICRYL

## (undated) DEVICE — SUT ETHLN 3/0 FS1 30IN 669H

## (undated) DEVICE — KT SURG TURNOVER 050

## (undated) DEVICE — STPLR SKIN VISISTAT WD 35CT

## (undated) DEVICE — EPF KIT: Brand: ENDOTRAC

## (undated) DEVICE — SPONGE,NEURO,0.5"X3",XR,STRL,LF,10/PK: Brand: MEDLINE

## (undated) DEVICE — SYR LUERLOK 20CC BX/50

## (undated) DEVICE — MICRO KNIFE, SICKLE-SHAPED: Brand: N.A.

## (undated) DEVICE — DRN WND JP RND W TROC SIL 10F 1/8IN

## (undated) DEVICE — NDL HYPO PRECISIONGLIDE/REG 18G 1IN PNK

## (undated) DEVICE — SOLUTION,WATER,IRRIGATION,1000ML,STERILE: Brand: MEDLINE

## (undated) DEVICE — ELECTRD BLD EZ CLN MOD 6.5IN

## (undated) DEVICE — BLADE 1884006EM RAD40 4MM M4 ROTATE ROHS: Brand: FUSION®

## (undated) DEVICE — ELECTRD NDL MEGADYNE EZCLEAN EXT MOD 6IN

## (undated) DEVICE — CONTAINER,SPECIMEN,OR STERILE,4OZ: Brand: MEDLINE

## (undated) DEVICE — SYR LL TP 10ML STRL

## (undated) DEVICE — ADHS SKIN SURG TISS VISC PREMIERPRO EXOFIN HI/VISC FAST/DRY

## (undated) DEVICE — APPL HEMOS FIBRIN DUPLOTIP W/SNPLK 5MM 32CM

## (undated) DEVICE — UNDERGLV SURG BIOGEL INDICAT PI SZ8 BLU

## (undated) DEVICE — TOOL MR8-T14BA30D MR8 14CM TLS BAL D 3MM: Brand: MIDAS REX MR8

## (undated) DEVICE — TUBING, SUCTION, 1/4" X 20', STRAIGHT: Brand: MEDLINE INDUSTRIES, INC.

## (undated) DEVICE — COVER,MAYO STAND,STERILE: Brand: MEDLINE

## (undated) DEVICE — APPL CHLORAPREP HI/LITE 26ML ORNG

## (undated) DEVICE — PAD,NON-ADHERENT,3X8,STERILE,LF,1/PK: Brand: MEDLINE

## (undated) DEVICE — GLOVE,SURG,SENSICARE,ALOE,LF,PF,7: Brand: MEDLINE

## (undated) DEVICE — SWABSTK SKINPREP PVPI LF PK/3

## (undated) DEVICE — DRSNG GZ CURAD XEROFORM PETROLTM OVERWRP 1X8IN STRL

## (undated) DEVICE — COAGULATOR SXN MEGADYNE FT/CONTRL 10F 6IN

## (undated) DEVICE — SUT ETHLN 4/0 PS2 18IN 1667H

## (undated) DEVICE — SPONGE,NEURO,.75"X.75",XR,STRL,LF,10/PK: Brand: MEDLINE

## (undated) DEVICE — INSTR TRACKER 9733533 EM ENT

## (undated) DEVICE — DISPOSABLE BIPOLAR CABLE 12FT. (3.6M): Brand: KIRWAN